# Patient Record
Sex: FEMALE | Race: WHITE | NOT HISPANIC OR LATINO
[De-identification: names, ages, dates, MRNs, and addresses within clinical notes are randomized per-mention and may not be internally consistent; named-entity substitution may affect disease eponyms.]

---

## 2017-01-23 ENCOUNTER — APPOINTMENT (OUTPATIENT)
Dept: INTERNAL MEDICINE | Facility: CLINIC | Age: 77
End: 2017-01-23

## 2017-02-07 ENCOUNTER — APPOINTMENT (OUTPATIENT)
Dept: INTERNAL MEDICINE | Facility: CLINIC | Age: 77
End: 2017-02-07

## 2017-02-07 VITALS
HEART RATE: 92 BPM | TEMPERATURE: 98.4 F | BODY MASS INDEX: 40.8 KG/M2 | HEIGHT: 64 IN | OXYGEN SATURATION: 92 % | WEIGHT: 239 LBS | DIASTOLIC BLOOD PRESSURE: 63 MMHG | SYSTOLIC BLOOD PRESSURE: 131 MMHG | RESPIRATION RATE: 12 BRPM

## 2017-03-01 ENCOUNTER — APPOINTMENT (OUTPATIENT)
Dept: CARDIOLOGY | Facility: CLINIC | Age: 77
End: 2017-03-01

## 2017-03-01 VITALS
TEMPERATURE: 98.7 F | OXYGEN SATURATION: 90 % | WEIGHT: 235 LBS | HEART RATE: 92 BPM | BODY MASS INDEX: 40.12 KG/M2 | HEIGHT: 64 IN | DIASTOLIC BLOOD PRESSURE: 85 MMHG | RESPIRATION RATE: 12 BRPM | SYSTOLIC BLOOD PRESSURE: 156 MMHG

## 2017-03-01 VITALS — SYSTOLIC BLOOD PRESSURE: 135 MMHG | DIASTOLIC BLOOD PRESSURE: 84 MMHG

## 2017-04-06 ENCOUNTER — OUTPATIENT (OUTPATIENT)
Dept: OUTPATIENT SERVICES | Facility: HOSPITAL | Age: 77
LOS: 1 days | Discharge: HOME | End: 2017-04-06

## 2017-04-06 DIAGNOSIS — C50.919 MALIGNANT NEOPLASM OF UNSPECIFIED SITE OF UNSPECIFIED FEMALE BREAST: ICD-10-CM

## 2017-04-07 ENCOUNTER — APPOINTMENT (OUTPATIENT)
Dept: HEMATOLOGY ONCOLOGY | Facility: CLINIC | Age: 77
End: 2017-04-07

## 2017-04-07 VITALS
TEMPERATURE: 97.9 F | RESPIRATION RATE: 16 BRPM | DIASTOLIC BLOOD PRESSURE: 85 MMHG | HEART RATE: 89 BPM | SYSTOLIC BLOOD PRESSURE: 147 MMHG | BODY MASS INDEX: 40.76 KG/M2 | OXYGEN SATURATION: 95 % | WEIGHT: 237.44 LBS

## 2017-04-07 RX ORDER — PREDNISONE 20 MG/1
20 TABLET ORAL
Qty: 10 | Refills: 0 | Status: DISCONTINUED | COMMUNITY
Start: 2016-10-18

## 2017-04-10 DIAGNOSIS — C50.911 MALIGNANT NEOPLASM OF UNSPECIFIED SITE OF RIGHT FEMALE BREAST: ICD-10-CM

## 2017-06-22 ENCOUNTER — RESULT REVIEW (OUTPATIENT)
Age: 77
End: 2017-06-22

## 2017-08-09 ENCOUNTER — APPOINTMENT (OUTPATIENT)
Dept: CARDIOLOGY | Facility: CLINIC | Age: 77
End: 2017-08-09
Payer: MEDICARE

## 2017-08-09 ENCOUNTER — NON-APPOINTMENT (OUTPATIENT)
Age: 77
End: 2017-08-09

## 2017-08-09 ENCOUNTER — LABORATORY RESULT (OUTPATIENT)
Age: 77
End: 2017-08-09

## 2017-08-09 VITALS
HEART RATE: 88 BPM | BODY MASS INDEX: 41.32 KG/M2 | WEIGHT: 242 LBS | SYSTOLIC BLOOD PRESSURE: 132 MMHG | TEMPERATURE: 98 F | RESPIRATION RATE: 12 BRPM | DIASTOLIC BLOOD PRESSURE: 82 MMHG | HEIGHT: 64 IN | OXYGEN SATURATION: 93 %

## 2017-08-09 PROCEDURE — 99215 OFFICE O/P EST HI 40 MIN: CPT

## 2017-08-10 LAB
24R-OH-CALCIDIOL SERPL-MCNC: 53.1 PG/ML
ALBUMIN SERPL ELPH-MCNC: 3.8 G/DL
ALP BLD-CCNC: 81 U/L
ALT SERPL-CCNC: 21 U/L
ANION GAP SERPL CALC-SCNC: 19 MMOL/L
AST SERPL-CCNC: 28 U/L
BASOPHILS # BLD AUTO: 0.05 K/UL
BASOPHILS NFR BLD AUTO: 0.9 %
BILIRUB SERPL-MCNC: 0.5 MG/DL
BUN SERPL-MCNC: 21 MG/DL
CALCIUM SERPL-MCNC: 9.1 MG/DL
CHLORIDE SERPL-SCNC: 102 MMOL/L
CHOLEST SERPL-MCNC: 142 MG/DL
CHOLEST/HDLC SERPL: 3.6 RATIO
CO2 SERPL-SCNC: 24 MMOL/L
CREAT SERPL-MCNC: 1.06 MG/DL
EOSINOPHIL # BLD AUTO: 0.19 K/UL
EOSINOPHIL NFR BLD AUTO: 3.7 %
GLUCOSE SERPL-MCNC: 134 MG/DL
HBA1C MFR BLD HPLC: 5.6 %
HCT VFR BLD CALC: 42.8 %
HDLC SERPL-MCNC: 40 MG/DL
HGB BLD-MCNC: 13.4 G/DL
LDLC SERPL CALC-MCNC: 71 MG/DL
LYMPHOCYTES # BLD AUTO: 1 K/UL
LYMPHOCYTES NFR BLD AUTO: 19.3 %
MAN DIFF?: NORMAL
MCHC RBC-ENTMCNC: 29.4 PG
MCHC RBC-ENTMCNC: 31.3 GM/DL
MCV RBC AUTO: 93.9 FL
MONOCYTES # BLD AUTO: 0.33 K/UL
MONOCYTES NFR BLD AUTO: 6.4 %
NEUTROPHILS # BLD AUTO: 3.62 K/UL
NEUTROPHILS NFR BLD AUTO: 62.4 %
PLATELET # BLD AUTO: 157 K/UL
POTASSIUM SERPL-SCNC: 4 MMOL/L
PROT SERPL-MCNC: 7.1 G/DL
RBC # BLD: 4.56 M/UL
RBC # FLD: 14.6 %
SODIUM SERPL-SCNC: 145 MMOL/L
TRIGL SERPL-MCNC: 153 MG/DL
WBC # FLD AUTO: 5.2 K/UL

## 2017-09-15 ENCOUNTER — APPOINTMENT (OUTPATIENT)
Dept: INTERNAL MEDICINE | Facility: CLINIC | Age: 77
End: 2017-09-15
Payer: MEDICARE

## 2017-09-15 VITALS
DIASTOLIC BLOOD PRESSURE: 83 MMHG | BODY MASS INDEX: 40.12 KG/M2 | HEIGHT: 64 IN | SYSTOLIC BLOOD PRESSURE: 156 MMHG | HEART RATE: 95 BPM | WEIGHT: 235 LBS | RESPIRATION RATE: 12 BRPM | TEMPERATURE: 98.4 F | OXYGEN SATURATION: 94 %

## 2017-09-15 DIAGNOSIS — J30.1 ALLERGIC RHINITIS DUE TO POLLEN: ICD-10-CM

## 2017-09-15 PROCEDURE — 99214 OFFICE O/P EST MOD 30 MIN: CPT

## 2017-12-13 ENCOUNTER — APPOINTMENT (OUTPATIENT)
Dept: CARDIOLOGY | Facility: CLINIC | Age: 77
End: 2017-12-13
Payer: MEDICARE

## 2017-12-13 ENCOUNTER — NON-APPOINTMENT (OUTPATIENT)
Age: 77
End: 2017-12-13

## 2017-12-13 VITALS
SYSTOLIC BLOOD PRESSURE: 140 MMHG | TEMPERATURE: 98.2 F | RESPIRATION RATE: 12 BRPM | HEART RATE: 95 BPM | WEIGHT: 236 LBS | DIASTOLIC BLOOD PRESSURE: 80 MMHG | HEIGHT: 64 IN | BODY MASS INDEX: 40.29 KG/M2 | OXYGEN SATURATION: 94 %

## 2017-12-13 VITALS — DIASTOLIC BLOOD PRESSURE: 80 MMHG | SYSTOLIC BLOOD PRESSURE: 124 MMHG

## 2017-12-13 PROCEDURE — 99214 OFFICE O/P EST MOD 30 MIN: CPT

## 2017-12-13 PROCEDURE — 93306 TTE W/DOPPLER COMPLETE: CPT

## 2018-02-05 ENCOUNTER — APPOINTMENT (OUTPATIENT)
Dept: INTERNAL MEDICINE | Facility: CLINIC | Age: 78
End: 2018-02-05
Payer: MEDICARE

## 2018-02-05 VITALS
WEIGHT: 233 LBS | SYSTOLIC BLOOD PRESSURE: 167 MMHG | RESPIRATION RATE: 12 BRPM | DIASTOLIC BLOOD PRESSURE: 89 MMHG | HEIGHT: 64 IN | OXYGEN SATURATION: 94 % | HEART RATE: 84 BPM | TEMPERATURE: 97.7 F | BODY MASS INDEX: 39.78 KG/M2

## 2018-02-05 VITALS — SYSTOLIC BLOOD PRESSURE: 130 MMHG | DIASTOLIC BLOOD PRESSURE: 80 MMHG

## 2018-02-05 PROCEDURE — 99214 OFFICE O/P EST MOD 30 MIN: CPT

## 2018-02-05 RX ORDER — FLUTICASONE PROPIONATE 50 UG/1
50 SPRAY, METERED NASAL TWICE DAILY
Qty: 3 | Refills: 3 | Status: DISCONTINUED | COMMUNITY
Start: 2017-09-15 | End: 2018-02-05

## 2018-02-07 ENCOUNTER — LABORATORY RESULT (OUTPATIENT)
Age: 78
End: 2018-02-07

## 2018-02-08 LAB
25(OH)D3 SERPL-MCNC: 49 NG/ML
APPEARANCE: ABNORMAL
BASOPHILS # BLD AUTO: 0.02 K/UL
BASOPHILS NFR BLD AUTO: 0.4 %
BILIRUBIN URINE: NEGATIVE
BLOOD URINE: NEGATIVE
CHOLEST SERPL-MCNC: 136 MG/DL
CHOLEST/HDLC SERPL: 3.1 RATIO
COLOR: YELLOW
CREAT SPEC-SCNC: 200 MG/DL
EOSINOPHIL # BLD AUTO: 0.26 K/UL
EOSINOPHIL NFR BLD AUTO: 5 %
FERRITIN SERPL-MCNC: 278 NG/ML
FOLATE SERPL-MCNC: >20 NG/ML
GLUCOSE QUALITATIVE U: NEGATIVE MG/DL
HBA1C MFR BLD HPLC: 5.5 %
HCT VFR BLD CALC: 42.8 %
HDLC SERPL-MCNC: 44 MG/DL
HGB BLD-MCNC: 13.5 G/DL
IMM GRANULOCYTES NFR BLD AUTO: 0.2 %
KETONES URINE: NEGATIVE
LDLC SERPL CALC-MCNC: 68 MG/DL
LEUKOCYTE ESTERASE URINE: NEGATIVE
LYMPHOCYTES # BLD AUTO: 1.47 K/UL
LYMPHOCYTES NFR BLD AUTO: 28.1 %
MAN DIFF?: NORMAL
MCHC RBC-ENTMCNC: 28.5 PG
MCHC RBC-ENTMCNC: 31.5 GM/DL
MCV RBC AUTO: 90.5 FL
MICROALBUMIN 24H UR DL<=1MG/L-MCNC: 10.9 MG/DL
MICROALBUMIN/CREAT 24H UR-RTO: 55 MG/G
MONOCYTES # BLD AUTO: 0.32 K/UL
MONOCYTES NFR BLD AUTO: 6.1 %
NEUTROPHILS # BLD AUTO: 3.16 K/UL
NEUTROPHILS NFR BLD AUTO: 60.2 %
NITRITE URINE: NEGATIVE
PH URINE: 6
PLATELET # BLD AUTO: 139 K/UL
PROTEIN URINE: 30 MG/DL
RBC # BLD: 4.73 M/UL
RBC # FLD: 13.6 %
SPECIFIC GRAVITY URINE: 1.02
TRIGL SERPL-MCNC: 121 MG/DL
TSH SERPL-ACNC: 1.54 UIU/ML
UROBILINOGEN URINE: 1 MG/DL
VIT B12 SERPL-MCNC: 498 PG/ML
WBC # FLD AUTO: 5.24 K/UL

## 2018-02-28 LAB
ALBUMIN SERPL ELPH-MCNC: 3.9 G/DL
ALP BLD-CCNC: 84 U/L
ALT SERPL-CCNC: 24 U/L
ANION GAP SERPL CALC-SCNC: 17 MMOL/L
AST SERPL-CCNC: 30 U/L
BILIRUB SERPL-MCNC: 0.7 MG/DL
BUN SERPL-MCNC: 22 MG/DL
CALCIUM SERPL-MCNC: 9.9 MG/DL
CHLORIDE SERPL-SCNC: 101 MMOL/L
CO2 SERPL-SCNC: 25 MMOL/L
CREAT SERPL-MCNC: 1.11 MG/DL
GLUCOSE SERPL-MCNC: 82 MG/DL
POTASSIUM SERPL-SCNC: 4 MMOL/L
PROT SERPL-MCNC: 7.4 G/DL
SODIUM SERPL-SCNC: 143 MMOL/L

## 2018-04-12 ENCOUNTER — OUTPATIENT (OUTPATIENT)
Dept: OUTPATIENT SERVICES | Facility: HOSPITAL | Age: 78
LOS: 1 days | Discharge: HOME | End: 2018-04-12

## 2018-04-12 DIAGNOSIS — C50.911 MALIGNANT NEOPLASM OF UNSPECIFIED SITE OF RIGHT FEMALE BREAST: ICD-10-CM

## 2018-04-17 ENCOUNTER — APPOINTMENT (OUTPATIENT)
Dept: HEMATOLOGY ONCOLOGY | Facility: CLINIC | Age: 78
End: 2018-04-17
Payer: MEDICARE

## 2018-04-17 VITALS
BODY MASS INDEX: 39.77 KG/M2 | RESPIRATION RATE: 12 BRPM | TEMPERATURE: 98 F | WEIGHT: 231.68 LBS | OXYGEN SATURATION: 95 % | DIASTOLIC BLOOD PRESSURE: 80 MMHG | HEART RATE: 85 BPM | SYSTOLIC BLOOD PRESSURE: 152 MMHG

## 2018-04-17 PROCEDURE — 99214 OFFICE O/P EST MOD 30 MIN: CPT

## 2018-04-18 ENCOUNTER — APPOINTMENT (OUTPATIENT)
Dept: CARDIOLOGY | Facility: CLINIC | Age: 78
End: 2018-04-18
Payer: MEDICARE

## 2018-04-18 ENCOUNTER — NON-APPOINTMENT (OUTPATIENT)
Age: 78
End: 2018-04-18

## 2018-04-18 VITALS
BODY MASS INDEX: 39.61 KG/M2 | HEART RATE: 86 BPM | SYSTOLIC BLOOD PRESSURE: 152 MMHG | RESPIRATION RATE: 12 BRPM | OXYGEN SATURATION: 94 % | HEIGHT: 64 IN | DIASTOLIC BLOOD PRESSURE: 85 MMHG | WEIGHT: 232 LBS

## 2018-04-18 VITALS — SYSTOLIC BLOOD PRESSURE: 130 MMHG | DIASTOLIC BLOOD PRESSURE: 78 MMHG

## 2018-04-18 PROCEDURE — 99214 OFFICE O/P EST MOD 30 MIN: CPT

## 2018-08-07 ENCOUNTER — APPOINTMENT (OUTPATIENT)
Dept: INTERNAL MEDICINE | Facility: CLINIC | Age: 78
End: 2018-08-07
Payer: MEDICARE

## 2018-08-07 VITALS
DIASTOLIC BLOOD PRESSURE: 83 MMHG | RESPIRATION RATE: 12 BRPM | HEART RATE: 79 BPM | BODY MASS INDEX: 38.93 KG/M2 | WEIGHT: 228 LBS | TEMPERATURE: 98.3 F | HEIGHT: 64 IN | OXYGEN SATURATION: 94 % | SYSTOLIC BLOOD PRESSURE: 141 MMHG

## 2018-08-07 PROCEDURE — 99214 OFFICE O/P EST MOD 30 MIN: CPT

## 2018-08-07 RX ORDER — PNEUMOCOCCAL 13-VALENT CONJUGATE VACCINE 2.2; 2.2; 2.2; 2.2; 2.2; 4.4; 2.2; 2.2; 2.2; 2.2; 2.2; 2.2; 2.2 UG/.5ML; UG/.5ML; UG/.5ML; UG/.5ML; UG/.5ML; UG/.5ML; UG/.5ML; UG/.5ML; UG/.5ML; UG/.5ML; UG/.5ML; UG/.5ML; UG/.5ML
INJECTION, SUSPENSION INTRAMUSCULAR
Qty: 1 | Refills: 0 | Status: DISCONTINUED | COMMUNITY
Start: 2018-02-05 | End: 2018-08-07

## 2018-08-07 NOTE — PHYSICAL EXAM

## 2018-08-08 LAB
BASOPHILS # BLD AUTO: 0.02 K/UL
BASOPHILS NFR BLD AUTO: 0.4 %
CHOLEST SERPL-MCNC: 134 MG/DL
CHOLEST/HDLC SERPL: 3.3 RATIO
EOSINOPHIL # BLD AUTO: 0.2 K/UL
EOSINOPHIL NFR BLD AUTO: 4.1 %
FERRITIN SERPL-MCNC: 282 NG/ML
HBA1C MFR BLD HPLC: 5.6 %
HCT VFR BLD CALC: 43.7 %
HDLC SERPL-MCNC: 41 MG/DL
HGB BLD-MCNC: 14.5 G/DL
IMM GRANULOCYTES NFR BLD AUTO: 0.2 %
LDLC SERPL CALC-MCNC: 69 MG/DL
LYMPHOCYTES # BLD AUTO: 1.27 K/UL
LYMPHOCYTES NFR BLD AUTO: 25.8 %
MAGNESIUM SERPL-MCNC: 2 MG/DL
MAN DIFF?: NORMAL
MCHC RBC-ENTMCNC: 30.3 PG
MCHC RBC-ENTMCNC: 33.2 GM/DL
MCV RBC AUTO: 91.2 FL
MONOCYTES # BLD AUTO: 0.33 K/UL
MONOCYTES NFR BLD AUTO: 6.7 %
NEUTROPHILS # BLD AUTO: 3.09 K/UL
NEUTROPHILS NFR BLD AUTO: 62.8 %
PLATELET # BLD AUTO: 143 K/UL
RBC # BLD: 4.79 M/UL
RBC # FLD: 13.6 %
TRIGL SERPL-MCNC: 121 MG/DL
TSH SERPL-ACNC: 1.24 UIU/ML
WBC # FLD AUTO: 4.92 K/UL

## 2018-08-15 ENCOUNTER — APPOINTMENT (OUTPATIENT)
Dept: CARDIOLOGY | Facility: CLINIC | Age: 78
End: 2018-08-15
Payer: MEDICARE

## 2018-08-15 ENCOUNTER — NON-APPOINTMENT (OUTPATIENT)
Age: 78
End: 2018-08-15

## 2018-08-15 VITALS
DIASTOLIC BLOOD PRESSURE: 74 MMHG | WEIGHT: 228 LBS | HEART RATE: 84 BPM | BODY MASS INDEX: 38.93 KG/M2 | HEIGHT: 64 IN | OXYGEN SATURATION: 94 % | TEMPERATURE: 98.1 F | SYSTOLIC BLOOD PRESSURE: 125 MMHG | RESPIRATION RATE: 12 BRPM

## 2018-08-15 PROCEDURE — 99214 OFFICE O/P EST MOD 30 MIN: CPT

## 2018-08-24 LAB
ALBUMIN SERPL ELPH-MCNC: 4.1 G/DL
ALP BLD-CCNC: 91 U/L
ALT SERPL-CCNC: 19 U/L
ANION GAP SERPL CALC-SCNC: 15 MMOL/L
AST SERPL-CCNC: 23 U/L
BILIRUB SERPL-MCNC: 0.7 MG/DL
BUN SERPL-MCNC: 24 MG/DL
CALCIUM SERPL-MCNC: 9.8 MG/DL
CHLORIDE SERPL-SCNC: 103 MMOL/L
CO2 SERPL-SCNC: 25 MMOL/L
CREAT SERPL-MCNC: 1.35 MG/DL
GLUCOSE SERPL-MCNC: 97 MG/DL
POTASSIUM SERPL-SCNC: 4.1 MMOL/L
PROT SERPL-MCNC: 7.3 G/DL
SODIUM SERPL-SCNC: 143 MMOL/L

## 2018-12-05 ENCOUNTER — NON-APPOINTMENT (OUTPATIENT)
Age: 78
End: 2018-12-05

## 2018-12-05 ENCOUNTER — APPOINTMENT (OUTPATIENT)
Dept: CARDIOLOGY | Facility: CLINIC | Age: 78
End: 2018-12-05
Payer: MEDICARE

## 2018-12-05 ENCOUNTER — APPOINTMENT (OUTPATIENT)
Dept: INTERNAL MEDICINE | Facility: CLINIC | Age: 78
End: 2018-12-05
Payer: MEDICARE

## 2018-12-05 VITALS
SYSTOLIC BLOOD PRESSURE: 167 MMHG | TEMPERATURE: 98 F | OXYGEN SATURATION: 94 % | RESPIRATION RATE: 12 BRPM | HEIGHT: 64 IN | BODY MASS INDEX: 38.93 KG/M2 | DIASTOLIC BLOOD PRESSURE: 87 MMHG | HEART RATE: 82 BPM | WEIGHT: 228 LBS

## 2018-12-05 VITALS — DIASTOLIC BLOOD PRESSURE: 74 MMHG | SYSTOLIC BLOOD PRESSURE: 128 MMHG

## 2018-12-05 VITALS — DIASTOLIC BLOOD PRESSURE: 90 MMHG | SYSTOLIC BLOOD PRESSURE: 170 MMHG

## 2018-12-05 PROCEDURE — 99214 OFFICE O/P EST MOD 30 MIN: CPT

## 2018-12-05 PROCEDURE — 99214 OFFICE O/P EST MOD 30 MIN: CPT | Mod: 25

## 2018-12-05 PROCEDURE — 93306 TTE W/DOPPLER COMPLETE: CPT

## 2018-12-05 NOTE — REVIEW OF SYSTEMS
[see HPI] : see HPI [Negative] : Heme/Lymph [Recent Weight Gain (___ Lbs)] : no recent weight gain [Recent Weight Loss (___ Lbs)] : no recent weight loss [Shortness Of Breath] : no shortness of breath [Dyspnea on exertion] : not dyspnea during exertion [Chest Pain] : no chest pain [Lower Ext Edema] : no extremity edema [Palpitations] : no palpitations

## 2018-12-05 NOTE — REASON FOR VISIT
[Follow-Up - Clinic] : a clinic follow-up of [Aortic Stenosis] : aortic stenosis [Hypertension] : hypertension

## 2018-12-05 NOTE — PHYSICAL EXAM
[General Appearance - Well Developed] : well developed [Normal Appearance] : normal appearance [Well Groomed] : well groomed [General Appearance - Well Nourished] : well nourished [No Deformities] : no deformities [General Appearance - In No Acute Distress] : no acute distress [Normal Conjunctiva] : the conjunctiva exhibited no abnormalities [Eyelids - No Xanthelasma] : the eyelids demonstrated no xanthelasmas [Normal Oral Mucosa] : normal oral mucosa [No Oral Pallor] : no oral pallor [No Oral Cyanosis] : no oral cyanosis [Normal Jugular Venous A Waves Present] : normal jugular venous A waves present [Normal Jugular Venous V Waves Present] : normal jugular venous V waves present [No Jugular Venous Whittaker A Waves] : no jugular venous whittaker A waves [Respiration, Rhythm And Depth] : normal respiratory rhythm and effort [Exaggerated Use Of Accessory Muscles For Inspiration] : no accessory muscle use [Auscultation Breath Sounds / Voice Sounds] : lungs were clear to auscultation bilaterally [Heart Rate And Rhythm] : heart rate and rhythm were normal [Heart Sounds] : normal S1 and S2 [Systolic grade ___/6] : A grade [unfilled]/6 systolic murmur was heard. [Abdomen Soft] : soft [Abdomen Tenderness] : non-tender [Abdomen Mass (___ Cm)] : no abdominal mass palpated [Abnormal Walk] : normal gait [Gait - Sufficient For Exercise Testing] : the gait was sufficient for exercise testing [Nail Clubbing] : no clubbing of the fingernails [Cyanosis, Localized] : no localized cyanosis [Petechial Hemorrhages (___cm)] : no petechial hemorrhages [Skin Color & Pigmentation] : normal skin color and pigmentation [] : no rash [No Venous Stasis] : no venous stasis [Skin Lesions] : no skin lesions [No Skin Ulcers] : no skin ulcer [No Xanthoma] : no  xanthoma was observed [Oriented To Time, Place, And Person] : oriented to person, place, and time [Affect] : the affect was normal [Mood] : the mood was normal [No Anxiety] : not feeling anxious

## 2018-12-05 NOTE — HISTORY OF PRESENT ILLNESS
[FreeTextEntry1] : Rachel has been walking for exercise 20 minutes a day on treadmill. She denies any chest pain, palpitations or shortness of breath. No change in functional capacity. Did not understand why BP high earlier today twice.

## 2018-12-05 NOTE — DISCUSSION/SUMMARY
[___ Month(s)] : [unfilled] month(s) [FreeTextEntry1] : The patient is a 77-year-old obese female history of hypertension, hyperlipidemia,  LVH, moderate aortic stenosis who is asymptomatic.\par #1 AS- no change in functional capacity, euvolemic on exam, preliminary review of ECHO ADIS 1.0\par #2 Htn- controlled on losartan/HCTZ\par #3 Lipids- therapeutic on atorvastatin\par #4 General-  Importance of trying to lose weight by continuing her exercise and adherence to a low fat, low cholesterol diet was also discussed.

## 2018-12-06 LAB
ANION GAP SERPL CALC-SCNC: 13 MMOL/L
BASOPHILS # BLD AUTO: 0.03 K/UL
BASOPHILS NFR BLD AUTO: 0.5 %
BUN SERPL-MCNC: 26 MG/DL
CALCIUM SERPL-MCNC: 9.8 MG/DL
CHLORIDE SERPL-SCNC: 107 MMOL/L
CO2 SERPL-SCNC: 25 MMOL/L
CREAT SERPL-MCNC: 1.11 MG/DL
EOSINOPHIL # BLD AUTO: 0.14 K/UL
EOSINOPHIL NFR BLD AUTO: 2.5 %
GLUCOSE SERPL-MCNC: 85 MG/DL
GLUCOSE SERPL-MCNC: 91 MG/DL
HBA1C MFR BLD HPLC: 5.4 %
HCT VFR BLD CALC: 42.3 %
HGB BLD-MCNC: 13.7 G/DL
IMM GRANULOCYTES NFR BLD AUTO: 0.2 %
LYMPHOCYTES # BLD AUTO: 1.55 K/UL
LYMPHOCYTES NFR BLD AUTO: 27.2 %
MAGNESIUM SERPL-MCNC: 2.1 MG/DL
MAN DIFF?: NORMAL
MCHC RBC-ENTMCNC: 29.3 PG
MCHC RBC-ENTMCNC: 32.4 GM/DL
MCV RBC AUTO: 90.6 FL
MONOCYTES # BLD AUTO: 0.25 K/UL
MONOCYTES NFR BLD AUTO: 4.4 %
NEUTROPHILS # BLD AUTO: 3.71 K/UL
NEUTROPHILS NFR BLD AUTO: 65.2 %
PLATELET # BLD AUTO: 145 K/UL
POTASSIUM SERPL-SCNC: 4.5 MMOL/L
RBC # BLD: 4.67 M/UL
RBC # FLD: 13 %
SODIUM SERPL-SCNC: 145 MMOL/L
WBC # FLD AUTO: 5.69 K/UL

## 2019-01-04 ENCOUNTER — APPOINTMENT (OUTPATIENT)
Dept: INTERNAL MEDICINE | Facility: CLINIC | Age: 79
End: 2019-01-04
Payer: MEDICARE

## 2019-01-04 VITALS
DIASTOLIC BLOOD PRESSURE: 75 MMHG | SYSTOLIC BLOOD PRESSURE: 110 MMHG | HEIGHT: 64 IN | WEIGHT: 231 LBS | TEMPERATURE: 98.2 F | HEART RATE: 73 BPM | RESPIRATION RATE: 12 BRPM | BODY MASS INDEX: 39.44 KG/M2 | OXYGEN SATURATION: 96 %

## 2019-01-04 DIAGNOSIS — J01.00 ACUTE MAXILLARY SINUSITIS, UNSPECIFIED: ICD-10-CM

## 2019-01-04 PROCEDURE — 99215 OFFICE O/P EST HI 40 MIN: CPT

## 2019-01-04 NOTE — REVIEW OF SYSTEMS
[Nasal Discharge] : nasal discharge [Postnasal Drip] : postnasal drip [Cough] : cough [Negative] : Heme/Lymph [Earache] : no earache [Hearing Loss] : no hearing loss [Nosebleed] : no nosebleeds [Hoarseness] : no hoarseness [Sore Throat] : no sore throat [Shortness Of Breath] : no shortness of breath [Wheezing] : no wheezing [Dyspnea on Exertion] : no dyspnea on exertion

## 2019-01-07 LAB — RAPID RVP RESULT: NOT DETECTED

## 2019-01-30 ENCOUNTER — RX RENEWAL (OUTPATIENT)
Age: 79
End: 2019-01-30

## 2019-02-21 ENCOUNTER — RX RENEWAL (OUTPATIENT)
Age: 79
End: 2019-02-21

## 2019-04-17 ENCOUNTER — APPOINTMENT (OUTPATIENT)
Dept: INTERNAL MEDICINE | Facility: CLINIC | Age: 79
End: 2019-04-17
Payer: MEDICARE

## 2019-04-17 ENCOUNTER — APPOINTMENT (OUTPATIENT)
Dept: CARDIOLOGY | Facility: CLINIC | Age: 79
End: 2019-04-17
Payer: MEDICARE

## 2019-04-17 ENCOUNTER — NON-APPOINTMENT (OUTPATIENT)
Age: 79
End: 2019-04-17

## 2019-04-17 VITALS
WEIGHT: 223 LBS | DIASTOLIC BLOOD PRESSURE: 92 MMHG | TEMPERATURE: 98.4 F | SYSTOLIC BLOOD PRESSURE: 150 MMHG | BODY MASS INDEX: 38.28 KG/M2 | OXYGEN SATURATION: 96 % | HEART RATE: 82 BPM | RESPIRATION RATE: 12 BRPM

## 2019-04-17 VITALS — SYSTOLIC BLOOD PRESSURE: 110 MMHG | DIASTOLIC BLOOD PRESSURE: 80 MMHG

## 2019-04-17 VITALS — SYSTOLIC BLOOD PRESSURE: 110 MMHG | DIASTOLIC BLOOD PRESSURE: 70 MMHG

## 2019-04-17 PROCEDURE — 99214 OFFICE O/P EST MOD 30 MIN: CPT | Mod: 25

## 2019-04-17 PROCEDURE — 99213 OFFICE O/P EST LOW 20 MIN: CPT

## 2019-04-17 RX ORDER — CEFUROXIME AXETIL 500 MG/1
500 TABLET ORAL
Qty: 20 | Refills: 0 | Status: DISCONTINUED | COMMUNITY
Start: 2019-01-04 | End: 2019-04-17

## 2019-04-17 RX ORDER — SODIUM CHLORIDE 0.65 %
0.65 AEROSOL, SPRAY (ML) NASAL TWICE DAILY
Qty: 1 | Refills: 2 | Status: DISCONTINUED | COMMUNITY
Start: 2019-01-04 | End: 2019-04-17

## 2019-04-17 RX ORDER — FAMOTIDINE 20 MG/1
20 TABLET, FILM COATED ORAL
Qty: 15 | Refills: 0 | Status: DISCONTINUED | COMMUNITY
Start: 2019-01-04 | End: 2019-04-17

## 2019-04-17 NOTE — PHYSICAL EXAM
[General Appearance - Well Developed] : well developed [Normal Appearance] : normal appearance [Well Groomed] : well groomed [General Appearance - Well Nourished] : well nourished [No Deformities] : no deformities [Normal Conjunctiva] : the conjunctiva exhibited no abnormalities [General Appearance - In No Acute Distress] : no acute distress [Eyelids - No Xanthelasma] : the eyelids demonstrated no xanthelasmas [Normal Oral Mucosa] : normal oral mucosa [No Oral Pallor] : no oral pallor [Normal Jugular Venous A Waves Present] : normal jugular venous A waves present [Normal Jugular Venous V Waves Present] : normal jugular venous V waves present [No Oral Cyanosis] : no oral cyanosis [No Jugular Venous Whittaker A Waves] : no jugular venous whittaker A waves [Respiration, Rhythm And Depth] : normal respiratory rhythm and effort [Exaggerated Use Of Accessory Muscles For Inspiration] : no accessory muscle use [Auscultation Breath Sounds / Voice Sounds] : lungs were clear to auscultation bilaterally [Heart Rate And Rhythm] : heart rate and rhythm were normal [Heart Sounds] : normal S1 and S2 [Systolic grade ___/6] : A grade [unfilled]/6 systolic murmur was heard. [Abdomen Soft] : soft [Abdomen Mass (___ Cm)] : no abdominal mass palpated [Abdomen Tenderness] : non-tender [Gait - Sufficient For Exercise Testing] : the gait was sufficient for exercise testing [Abnormal Walk] : normal gait [Petechial Hemorrhages (___cm)] : no petechial hemorrhages [Nail Clubbing] : no clubbing of the fingernails [Cyanosis, Localized] : no localized cyanosis [Skin Color & Pigmentation] : normal skin color and pigmentation [No Venous Stasis] : no venous stasis [] : no rash [Skin Lesions] : no skin lesions [No Skin Ulcers] : no skin ulcer [No Xanthoma] : no  xanthoma was observed [Oriented To Time, Place, And Person] : oriented to person, place, and time [Affect] : the affect was normal [Mood] : the mood was normal [No Anxiety] : not feeling anxious

## 2019-04-17 NOTE — HISTORY OF PRESENT ILLNESS
[FreeTextEntry1] : Rachel has been walking for exercise 20 minutes a day on treadmill. She denies any chest pain, palpitations or shortness of breath. No change in functional capacity.

## 2019-04-17 NOTE — COUNSELING
[Weight management counseling provided] : Weight management [Healthy eating counseling provided] : healthy eating [Activity counseling provided] : activity [Target Wt Loss Goal ___] : Target weight loss goal [unfilled] lbs [Weight Self Once Weekly] : Weight self once weekly [Low Fat Diet] : Low fat diet [Low Salt Diet] : Low salt diet [Decrease Portions] : Decrease food portions [___ min/wk activity recommended] : [unfilled] min/wk activity recommended [Walking] : Walking [None] : None [Good understanding] : Patient has a good understanding of lifestyle changes and the steps needed to achieve self management goals

## 2019-04-17 NOTE — DISCUSSION/SUMMARY
[___ Month(s)] : [unfilled] month(s) [FreeTextEntry1] : The patient is a 78-year-old obese female history of hypertension, hyperlipidemia,  LVH, moderate aortic stenosis who is asymptomatic.\par #1 AS- no change in functional capacity, euvolemic on exam,  ECHO ADIS 1.0\par #2 Htn- controlled on losartan/HCTZ\par #3 Lipids- therapeutic on atorvastatin\par #4 General-  Importance of continuing to lose weight by continuing her exercise and adherence to a low fat, low cholesterol diet was also discussed.

## 2019-04-17 NOTE — PHYSICAL EXAM

## 2019-04-21 LAB
ALBUMIN SERPL ELPH-MCNC: 4.1 G/DL
ALP BLD-CCNC: 101 U/L
ALT SERPL-CCNC: 16 U/L
ANION GAP SERPL CALC-SCNC: 14 MMOL/L
AST SERPL-CCNC: 23 U/L
BASOPHILS # BLD AUTO: 0.03 K/UL
BASOPHILS NFR BLD AUTO: 0.6 %
BILIRUB SERPL-MCNC: 0.6 MG/DL
BUN SERPL-MCNC: 20 MG/DL
CALCIUM SERPL-MCNC: 9.5 MG/DL
CHLORIDE SERPL-SCNC: 105 MMOL/L
CHOLEST SERPL-MCNC: 139 MG/DL
CHOLEST/HDLC SERPL: 3.2 RATIO
CO2 SERPL-SCNC: 25 MMOL/L
CREAT SERPL-MCNC: 1.12 MG/DL
EOSINOPHIL # BLD AUTO: 0.17 K/UL
EOSINOPHIL NFR BLD AUTO: 3.5 %
ESTIMATED AVERAGE GLUCOSE: 111 MG/DL
GLUCOSE SERPL-MCNC: 102 MG/DL
GLUCOSE SERPL-MCNC: 103 MG/DL
HBA1C MFR BLD HPLC: 5.5 %
HCT VFR BLD CALC: 43.1 %
HDLC SERPL-MCNC: 44 MG/DL
HGB BLD-MCNC: 13.6 G/DL
IMM GRANULOCYTES NFR BLD AUTO: 0.4 %
LDLC SERPL CALC-MCNC: 73 MG/DL
LYMPHOCYTES # BLD AUTO: 1.49 K/UL
LYMPHOCYTES NFR BLD AUTO: 30.6 %
MAN DIFF?: NORMAL
MCHC RBC-ENTMCNC: 29.4 PG
MCHC RBC-ENTMCNC: 31.6 GM/DL
MCV RBC AUTO: 93.3 FL
MONOCYTES # BLD AUTO: 0.27 K/UL
MONOCYTES NFR BLD AUTO: 5.5 %
NEUTROPHILS # BLD AUTO: 2.89 K/UL
NEUTROPHILS NFR BLD AUTO: 59.4 %
PLATELET # BLD AUTO: 141 K/UL
POTASSIUM SERPL-SCNC: 4.1 MMOL/L
PROT SERPL-MCNC: 6.7 G/DL
RBC # BLD: 4.62 M/UL
RBC # FLD: 12.9 %
SODIUM SERPL-SCNC: 144 MMOL/L
TRIGL SERPL-MCNC: 109 MG/DL
WBC # FLD AUTO: 4.87 K/UL

## 2019-07-17 ENCOUNTER — OUTPATIENT (OUTPATIENT)
Dept: OUTPATIENT SERVICES | Facility: HOSPITAL | Age: 79
LOS: 1 days | Discharge: HOME | End: 2019-07-17

## 2019-07-17 DIAGNOSIS — C50.911 MALIGNANT NEOPLASM OF UNSPECIFIED SITE OF RIGHT FEMALE BREAST: ICD-10-CM

## 2019-07-19 ENCOUNTER — APPOINTMENT (OUTPATIENT)
Dept: HEMATOLOGY ONCOLOGY | Facility: CLINIC | Age: 79
End: 2019-07-19
Payer: MEDICARE

## 2019-07-19 VITALS
BODY MASS INDEX: 38.6 KG/M2 | TEMPERATURE: 98.2 F | RESPIRATION RATE: 16 BRPM | OXYGEN SATURATION: 95 % | DIASTOLIC BLOOD PRESSURE: 89 MMHG | SYSTOLIC BLOOD PRESSURE: 136 MMHG | WEIGHT: 224.87 LBS | HEART RATE: 91 BPM

## 2019-07-19 PROCEDURE — 99213 OFFICE O/P EST LOW 20 MIN: CPT

## 2019-07-19 NOTE — HISTORY OF PRESENT ILLNESS
[Disease: _____________________] : Disease: [unfilled] [T: ___] : T[unfilled] [N: ___] : N[unfilled] [M: ___] : M[unfilled] [AJCC Stage: ____] : AJCC Stage: [unfilled] [de-identified] : Right breast cancer at 72\par s/p lumpectomy and SLN 9/13/02\par s/p radiation\par s/p Arimidex 1/03-3/08\par Enrolled in [employer],but withdrew after three months complaining of extreme sleepiness [de-identified] : 9 mm IDC, SBR 6/9, 2 SLN negative, ER/NE 90%, HER-2 negative [de-identified] : Rachel is doing well with no new medical problems.  \par Remains active with good energy and appetite. No pain.\par She completed her endocrine therapy for breast cancer 11 years ago.\par \par Routine Health Maintenance:\par Mammogram and breast ultrasound: 07/18\par Pap Smear: 2014 negative\par Bone density: 9/1/16 showed T scores of 4.3 in spine and -0.6 in femoral neck\par Colonoscopy: within 10 years, no polyps\par

## 2019-08-15 ENCOUNTER — APPOINTMENT (OUTPATIENT)
Dept: CARDIOLOGY | Facility: CLINIC | Age: 79
End: 2019-08-15
Payer: MEDICARE

## 2019-08-15 ENCOUNTER — NON-APPOINTMENT (OUTPATIENT)
Age: 79
End: 2019-08-15

## 2019-08-15 VITALS
OXYGEN SATURATION: 98 % | BODY MASS INDEX: 38.76 KG/M2 | HEART RATE: 74 BPM | WEIGHT: 227 LBS | HEIGHT: 64 IN | RESPIRATION RATE: 14 BRPM | TEMPERATURE: 98.4 F | DIASTOLIC BLOOD PRESSURE: 76 MMHG | SYSTOLIC BLOOD PRESSURE: 134 MMHG

## 2019-08-15 PROCEDURE — 99214 OFFICE O/P EST MOD 30 MIN: CPT | Mod: 25

## 2019-08-15 PROCEDURE — 93000 ELECTROCARDIOGRAM COMPLETE: CPT

## 2019-08-15 NOTE — PHYSICAL EXAM
[General Appearance - Well Developed] : well developed [Normal Appearance] : normal appearance [Well Groomed] : well groomed [General Appearance - Well Nourished] : well nourished [No Deformities] : no deformities [General Appearance - In No Acute Distress] : no acute distress [Normal Conjunctiva] : the conjunctiva exhibited no abnormalities [Eyelids - No Xanthelasma] : the eyelids demonstrated no xanthelasmas [Normal Oral Mucosa] : normal oral mucosa [No Oral Cyanosis] : no oral cyanosis [No Oral Pallor] : no oral pallor [Normal Jugular Venous A Waves Present] : normal jugular venous A waves present [Normal Jugular Venous V Waves Present] : normal jugular venous V waves present [No Jugular Venous Whittaker A Waves] : no jugular venous whittaker A waves [Respiration, Rhythm And Depth] : normal respiratory rhythm and effort [Exaggerated Use Of Accessory Muscles For Inspiration] : no accessory muscle use [Auscultation Breath Sounds / Voice Sounds] : lungs were clear to auscultation bilaterally [Heart Rate And Rhythm] : heart rate and rhythm were normal [Heart Sounds] : normal S1 and S2 [Systolic grade ___/6] : A grade [unfilled]/6 systolic murmur was heard. [Abdomen Tenderness] : non-tender [Abdomen Soft] : soft [Abdomen Mass (___ Cm)] : no abdominal mass palpated [Gait - Sufficient For Exercise Testing] : the gait was sufficient for exercise testing [Abnormal Walk] : normal gait [Nail Clubbing] : no clubbing of the fingernails [Cyanosis, Localized] : no localized cyanosis [Petechial Hemorrhages (___cm)] : no petechial hemorrhages [Skin Color & Pigmentation] : normal skin color and pigmentation [No Venous Stasis] : no venous stasis [] : no rash [Skin Lesions] : no skin lesions [No Skin Ulcers] : no skin ulcer [No Xanthoma] : no  xanthoma was observed [Oriented To Time, Place, And Person] : oriented to person, place, and time [Mood] : the mood was normal [Affect] : the affect was normal [No Anxiety] : not feeling anxious

## 2019-08-15 NOTE — DISCUSSION/SUMMARY
[___ Month(s)] : [unfilled] month(s) [FreeTextEntry1] : The patient is a 78-year-old obese female history of hypertension, hyperlipidemia,  LVH, moderate aortic stenosis who is asymptomatic.\par #1 AS- no change in functional capacity, euvolemic on exam,  ECHO ADIS 1.0, echo next visit\par #2 Htn- controlled on losartan/HCTZ\par #3 Lipids- therapeutic on atorvastatin\par #4 General-  Importance of continuing to lose weight by continuing her exercise and adherence to a low fat, low cholesterol diet was also discussed.

## 2019-08-15 NOTE — HISTORY OF PRESENT ILLNESS
[FreeTextEntry1] : Rachel has been walking for exercise 20 minutes a day on treadmill. She denies any chest pain, palpitations or shortness of breath. No change in functional capacity. Although she is going to bed earlier and has no trouble sleeping all night.

## 2019-10-16 ENCOUNTER — APPOINTMENT (OUTPATIENT)
Dept: CARDIOLOGY | Facility: CLINIC | Age: 79
End: 2019-10-16
Payer: MEDICARE

## 2019-10-16 ENCOUNTER — NON-APPOINTMENT (OUTPATIENT)
Age: 79
End: 2019-10-16

## 2019-10-16 ENCOUNTER — APPOINTMENT (OUTPATIENT)
Dept: INTERNAL MEDICINE | Facility: CLINIC | Age: 79
End: 2019-10-16
Payer: MEDICARE

## 2019-10-16 VITALS
BODY MASS INDEX: 38.41 KG/M2 | SYSTOLIC BLOOD PRESSURE: 158 MMHG | RESPIRATION RATE: 14 BRPM | DIASTOLIC BLOOD PRESSURE: 90 MMHG | WEIGHT: 225 LBS | HEIGHT: 64 IN | OXYGEN SATURATION: 95 % | TEMPERATURE: 98.6 F | HEART RATE: 84 BPM

## 2019-10-16 VITALS — SYSTOLIC BLOOD PRESSURE: 134 MMHG | DIASTOLIC BLOOD PRESSURE: 78 MMHG

## 2019-10-16 VITALS — SYSTOLIC BLOOD PRESSURE: 130 MMHG | DIASTOLIC BLOOD PRESSURE: 80 MMHG

## 2019-10-16 PROCEDURE — 99214 OFFICE O/P EST MOD 30 MIN: CPT

## 2019-10-16 PROCEDURE — 93306 TTE W/DOPPLER COMPLETE: CPT

## 2019-10-16 NOTE — REVIEW OF SYSTEMS
[see HPI] : see HPI [Negative] : Endocrine [Recent Weight Gain (___ Lbs)] : no recent weight gain [Recent Weight Loss (___ Lbs)] : no recent weight loss [Shortness Of Breath] : no shortness of breath [Dyspnea on exertion] : not dyspnea during exertion [Chest Pain] : no chest pain [Lower Ext Edema] : no extremity edema [Palpitations] : no palpitations

## 2019-10-16 NOTE — HISTORY OF PRESENT ILLNESS
[FreeTextEntry1] : Rachel continues to walk for exercise 20 minutes a day on treadmill. She denies any chest pain, palpitations or shortness of breath. No change in functional capacity.

## 2019-10-16 NOTE — DISCUSSION/SUMMARY
[___ Month(s)] : [unfilled] month(s) [FreeTextEntry1] : The patient is a 78-year-old obese female history of hypertension, hyperlipidemia,  LVH,  aortic stenosis who is asymptomatic.\par #1 AS- no change in functional capacity, euvolemic on exam,  ECHO ADIS 1.0, physical exam sounds severe, ECHO today, discussed AVR vs TAVR and evaluation with Valve clinic pending results, she is agreeable\par #2 Htn- controlled on losartan/HCTZ\par #3 Lipids- therapeutic on atorvastatin\par #4 General-  Importance of continuing to lose weight by continuing her exercise and adherence to a low fat, low cholesterol diet was also discussed.

## 2019-10-16 NOTE — REASON FOR VISIT
[Follow-Up - Clinic] : a clinic follow-up of [Hypertension] : hypertension [Aortic Stenosis] : aortic stenosis

## 2019-10-16 NOTE — PHYSICAL EXAM
[No Acute Distress] : no acute distress [Well Developed] : well developed [Well Nourished] : well nourished [Well-Appearing] : well-appearing [Normal Sclera/Conjunctiva] : normal sclera/conjunctiva [PERRL] : pupils equal round and reactive to light [EOMI] : extraocular movements intact [Normal Outer Ear/Nose] : the outer ears and nose were normal in appearance [Normal Oropharynx] : the oropharynx was normal [No JVD] : no jugular venous distention [No Lymphadenopathy] : no lymphadenopathy [Supple] : supple [Thyroid Normal, No Nodules] : the thyroid was normal and there were no nodules present [No Respiratory Distress] : no respiratory distress  [No Accessory Muscle Use] : no accessory muscle use [Clear to Auscultation] : lungs were clear to auscultation bilaterally [Normal Rate] : normal rate  [Regular Rhythm] : with a regular rhythm [Normal S1, S2] : normal S1 and S2 [III] : a grade 3 [No Carotid Bruits] : no carotid bruits [No Varicosities] : no varicosities [No Abdominal Bruit] : a ~M bruit was not heard ~T in the abdomen [Pedal Pulses Present] : the pedal pulses are present [No Edema] : there was no peripheral edema [No Extremity Clubbing/Cyanosis] : no extremity clubbing/cyanosis [Soft] : abdomen soft [No Palpable Aorta] : no palpable aorta [Non Tender] : non-tender [Non-distended] : non-distended [No Masses] : no abdominal mass palpated [Normal Bowel Sounds] : normal bowel sounds [No HSM] : no HSM [No CVA Tenderness] : no CVA  tenderness [Normal Posterior Cervical Nodes] : no posterior cervical lymphadenopathy [Normal Anterior Cervical Nodes] : no anterior cervical lymphadenopathy [No Joint Swelling] : no joint swelling [No Spinal Tenderness] : no spinal tenderness [No Rash] : no rash [Grossly Normal Strength/Tone] : grossly normal strength/tone [Coordination Grossly Intact] : coordination grossly intact [Normal Gait] : normal gait [No Focal Deficits] : no focal deficits [Normal Affect] : the affect was normal [Normal Insight/Judgement] : insight and judgment were intact [Deep Tendon Reflexes (DTR)] : deep tendon reflexes were 2+ and symmetric

## 2019-10-16 NOTE — PHYSICAL EXAM
[Normal Appearance] : normal appearance [General Appearance - Well Developed] : well developed [General Appearance - Well Nourished] : well nourished [Well Groomed] : well groomed [General Appearance - In No Acute Distress] : no acute distress [No Deformities] : no deformities [Normal Conjunctiva] : the conjunctiva exhibited no abnormalities [Eyelids - No Xanthelasma] : the eyelids demonstrated no xanthelasmas [Normal Oral Mucosa] : normal oral mucosa [No Oral Cyanosis] : no oral cyanosis [No Oral Pallor] : no oral pallor [Normal Jugular Venous A Waves Present] : normal jugular venous A waves present [No Jugular Venous Whittaker A Waves] : no jugular venous whittaker A waves [Normal Jugular Venous V Waves Present] : normal jugular venous V waves present [Exaggerated Use Of Accessory Muscles For Inspiration] : no accessory muscle use [Respiration, Rhythm And Depth] : normal respiratory rhythm and effort [Heart Sounds] : normal S1 and S2 [Heart Rate And Rhythm] : heart rate and rhythm were normal [Auscultation Breath Sounds / Voice Sounds] : lungs were clear to auscultation bilaterally [Systolic grade ___/6] : A grade [unfilled]/6 systolic murmur was heard. [Abdomen Soft] : soft [Abdomen Mass (___ Cm)] : no abdominal mass palpated [Abdomen Tenderness] : non-tender [Gait - Sufficient For Exercise Testing] : the gait was sufficient for exercise testing [Nail Clubbing] : no clubbing of the fingernails [Abnormal Walk] : normal gait [Petechial Hemorrhages (___cm)] : no petechial hemorrhages [Cyanosis, Localized] : no localized cyanosis [No Venous Stasis] : no venous stasis [] : no rash [Skin Color & Pigmentation] : normal skin color and pigmentation [Skin Lesions] : no skin lesions [No Skin Ulcers] : no skin ulcer [No Xanthoma] : no  xanthoma was observed [Oriented To Time, Place, And Person] : oriented to person, place, and time [Affect] : the affect was normal [Mood] : the mood was normal [No Anxiety] : not feeling anxious

## 2019-10-18 ENCOUNTER — RX CHANGE (OUTPATIENT)
Age: 79
End: 2019-10-18

## 2019-10-19 LAB
ALBUMIN SERPL ELPH-MCNC: 4.3 G/DL
ALP BLD-CCNC: 84 U/L
ALT SERPL-CCNC: 19 U/L
ANION GAP SERPL CALC-SCNC: 15 MMOL/L
AST SERPL-CCNC: 27 U/L
BASOPHILS # BLD AUTO: 0.04 K/UL
BASOPHILS NFR BLD AUTO: 0.7 %
BILIRUB SERPL-MCNC: 0.7 MG/DL
BUN SERPL-MCNC: 23 MG/DL
CALCIUM SERPL-MCNC: 10.1 MG/DL
CHLORIDE SERPL-SCNC: 102 MMOL/L
CHOLEST SERPL-MCNC: 142 MG/DL
CHOLEST/HDLC SERPL: 3.2 RATIO
CO2 SERPL-SCNC: 26 MMOL/L
CREAT SERPL-MCNC: 1.13 MG/DL
EOSINOPHIL # BLD AUTO: 0.11 K/UL
EOSINOPHIL NFR BLD AUTO: 1.9 %
ESTIMATED AVERAGE GLUCOSE: 108 MG/DL
GLUCOSE SERPL-MCNC: 101 MG/DL
GLUCOSE SERPL-MCNC: 102 MG/DL
HBA1C MFR BLD HPLC: 5.4 %
HCT VFR BLD CALC: 42.6 %
HDLC SERPL-MCNC: 44 MG/DL
HGB BLD-MCNC: 13.7 G/DL
IMM GRANULOCYTES NFR BLD AUTO: 0.2 %
LDLC SERPL CALC-MCNC: 71 MG/DL
LYMPHOCYTES # BLD AUTO: 1.28 K/UL
LYMPHOCYTES NFR BLD AUTO: 22.1 %
MAN DIFF?: NORMAL
MCHC RBC-ENTMCNC: 29.6 PG
MCHC RBC-ENTMCNC: 32.2 GM/DL
MCV RBC AUTO: 92 FL
MONOCYTES # BLD AUTO: 0.35 K/UL
MONOCYTES NFR BLD AUTO: 6 %
NEUTROPHILS # BLD AUTO: 4.01 K/UL
NEUTROPHILS NFR BLD AUTO: 69.1 %
PLATELET # BLD AUTO: 158 K/UL
POTASSIUM SERPL-SCNC: 4.3 MMOL/L
PROT SERPL-MCNC: 7.2 G/DL
RBC # BLD: 4.63 M/UL
RBC # FLD: 12.6 %
SODIUM SERPL-SCNC: 143 MMOL/L
TRIGL SERPL-MCNC: 135 MG/DL
WBC # FLD AUTO: 5.8 K/UL

## 2019-11-04 PROBLEM — J06.9 VIRAL URI WITH COUGH: Status: RESOLVED | Noted: 2019-01-04 | Resolved: 2019-11-04

## 2019-11-05 ENCOUNTER — APPOINTMENT (OUTPATIENT)
Dept: CARDIOTHORACIC SURGERY | Facility: CLINIC | Age: 79
End: 2019-11-05

## 2019-11-05 ENCOUNTER — NON-APPOINTMENT (OUTPATIENT)
Age: 79
End: 2019-11-05

## 2019-11-05 ENCOUNTER — OUTPATIENT (OUTPATIENT)
Dept: OUTPATIENT SERVICES | Facility: HOSPITAL | Age: 79
LOS: 1 days | End: 2019-11-05
Payer: MEDICARE

## 2019-11-05 ENCOUNTER — APPOINTMENT (OUTPATIENT)
Dept: CV DIAGNOSITCS | Facility: HOSPITAL | Age: 79
End: 2019-11-05

## 2019-11-05 ENCOUNTER — APPOINTMENT (OUTPATIENT)
Dept: CARDIOTHORACIC SURGERY | Facility: CLINIC | Age: 79
End: 2019-11-05
Payer: MEDICARE

## 2019-11-05 VITALS
HEIGHT: 64 IN | DIASTOLIC BLOOD PRESSURE: 84 MMHG | OXYGEN SATURATION: 96 % | HEART RATE: 76 BPM | TEMPERATURE: 98 F | BODY MASS INDEX: 38.58 KG/M2 | WEIGHT: 226 LBS | RESPIRATION RATE: 12 BRPM | SYSTOLIC BLOOD PRESSURE: 154 MMHG

## 2019-11-05 DIAGNOSIS — I35.0 NONRHEUMATIC AORTIC (VALVE) STENOSIS: ICD-10-CM

## 2019-11-05 DIAGNOSIS — J06.9 ACUTE UPPER RESPIRATORY INFECTION, UNSPECIFIED: ICD-10-CM

## 2019-11-05 DIAGNOSIS — H00.019 HORDEOLUM EXTERNUM UNSPECIFIED EYE, UNSPECIFIED EYELID: ICD-10-CM

## 2019-11-05 DIAGNOSIS — B97.89 ACUTE UPPER RESPIRATORY INFECTION, UNSPECIFIED: ICD-10-CM

## 2019-11-05 DIAGNOSIS — Z87.01 PERSONAL HISTORY OF PNEUMONIA (RECURRENT): ICD-10-CM

## 2019-11-05 DIAGNOSIS — R73.01 IMPAIRED FASTING GLUCOSE: ICD-10-CM

## 2019-11-05 DIAGNOSIS — L23.7 ALLERGIC CONTACT DERMATITIS DUE TO PLANTS, EXCEPT FOOD: ICD-10-CM

## 2019-11-05 PROCEDURE — 93306 TTE W/DOPPLER COMPLETE: CPT | Mod: 26

## 2019-11-05 PROCEDURE — 93306 TTE W/DOPPLER COMPLETE: CPT

## 2019-11-05 PROCEDURE — 99204 OFFICE O/P NEW MOD 45 MIN: CPT

## 2019-11-05 PROCEDURE — 93000 ELECTROCARDIOGRAM COMPLETE: CPT

## 2019-11-05 NOTE — DATA REVIEWED
[FreeTextEntry1] : Echocardiogram on 10/16/2019 demonstrated\par ADIS 0.8\par mGr 21 \par pGr 31 \par AoV 2.8\par mild mitral regurgitation\par  LVEF 65%.

## 2019-11-05 NOTE — REVIEW OF SYSTEMS
[Joint Pain] : joint pain [Negative] : Heme/Lymph [Feeling Fatigued] : not feeling fatigued [Shortness Of Breath] : no shortness of breath [Dyspnea on exertion] : not dyspnea during exertion [Lower Ext Edema] : no extremity edema [Abdominal Pain] : no abdominal pain [Nausea] : no nausea [Change in Appetite] : no change in appetite [Dizziness] : no dizziness

## 2019-11-05 NOTE — HISTORY OF PRESENT ILLNESS
[FreeTextEntry1] : Rachel is a 78 year old female with PMH of HTN, HLD, OA, breast CA, and aortic valve stenosis.  Recent TTE demonstrated a progression of her known aortic valve stenosis.  She was referred to valve clinic by Dr. Lopez for consideration for JULIET. She feels well, and states "I do not know why Dr. Lopez referred me here. I feel well." She denies palpitations, PND, orthopnea, dizziness, or syncope.

## 2019-11-05 NOTE — HISTORY OF PRESENT ILLNESS
[FreeTextEntry1] : Ms. Blackman is a 78 year old female referred by Dr. Lopez for evaluation of aortic stenosis. She underwent an echocardiogram with Dr. Lopez on 10/16 that suggested progression of AS to severe with an ADIS of 0.8. Her TTE was repeated today (for additional measurements of her AS) and demonstrates an ADIS of 1 with a mean AVg of 20, a DVI of 0.3, and an AoV of 3m/s, more consistent with moderate AS.  \par \claudia Ramos remains active, walking on her treadmill 20 minutes per day with no decrease in functional capacity.  She has noticed no symptoms on the treadmill or fatigue afterwards.  She denies angina, dyspnea, fatigue, PND, orthopnea, dizziness, syncope, or edema. Past medical history includes HTN, HLD, OA, and breast cancer 11 years ago s/p lumpectomy and radiation therapy with no recurrence.

## 2019-11-05 NOTE — DISCUSSION/SUMMARY
[Aortic Stenosis] : aortic stenosis [Stable] : stable [Multidetector Cardiac CT] : multidetector cardiac CT [Cardiac Catheterization] : cardiac catheterization [Coronary Angiography] : coronary angiography [None] : none [Patient] : the patient [Family] : the patient's family [FreeTextEntry1] : Rachel has AS with an ADIS of 1 and is active without symptoms.  I am recommending continued close monitoring at this time with a repeat TTE and visit with me in 6 months--or sooner should she develop any of the symptoms I discussed with her and her daughter in extensive detail.  She will continue to follow closely with Dr Lopez and I asked that she see her in 3 months.  Eventually, when her AS meets criteria for intervention, she will be scheduled for her cardiac catheterization (with Dr Lopez) and cardiac structural CT.

## 2019-11-05 NOTE — PHYSICAL EXAM
[General Appearance - Well Developed] : well developed [Normal Appearance] : normal appearance [General Appearance - Well Nourished] : well nourished [Normal Conjunctiva] : the conjunctiva exhibited no abnormalities [Normal Oral Mucosa] : normal oral mucosa [Normal Jugular Venous A Waves Present] : normal jugular venous A waves present [Normal Jugular Venous V Waves Present] : normal jugular venous V waves present [No Jugular Venous Whittaker A Waves] : no jugular venous whittaker A waves [Respiration, Rhythm And Depth] : normal respiratory rhythm and effort [Auscultation Breath Sounds / Voice Sounds] : lungs were clear to auscultation bilaterally [Bowel Sounds] : normal bowel sounds [Abdomen Soft] : soft [Abdomen Tenderness] : non-tender [Abnormal Walk] : normal gait [Nail Clubbing] : no clubbing of the fingernails [Cyanosis, Localized] : no localized cyanosis [Skin Turgor] : normal skin turgor [] : no rash [Oriented To Time, Place, And Person] : oriented to person, place, and time [Impaired Insight] : insight and judgment were intact [Affect] : the affect was normal [Normal Rate] : normal [Rhythm Regular] : regular [II] : a grade 2 [FreeTextEntry1] : trace nonpitting pedal edema

## 2019-11-05 NOTE — PHYSICAL EXAM
[General Appearance - Alert] : alert [General Appearance - In No Acute Distress] : in no acute distress [Sclera] : the sclera and conjunctiva were normal [PERRL With Normal Accommodation] : pupils were equal in size, round, and reactive to light [Extraocular Movements] : extraocular movements were intact [Outer Ear] : the ears and nose were normal in appearance [Oropharynx] : the oropharynx was normal [Jugular Venous Distention Increased] : there was no jugular-venous distention [] : no respiratory distress [Respiration, Rhythm And Depth] : normal respiratory rhythm and effort [Apical Impulse] : the apical impulse was normal [Examination Of The Chest] : the chest was normal in appearance [Edema] : there was no peripheral edema [Veins - Varicosity Changes] : there were no varicosital changes [Breast Appearance] : normal in appearance [Bowel Sounds] : normal bowel sounds [Abdomen Soft] : soft [FreeTextEntry1] : deferred [Cervical Lymph Nodes Enlarged Posterior Bilaterally] : posterior cervical [Cervical Lymph Nodes Enlarged Anterior Bilaterally] : anterior cervical [No CVA Tenderness] : no ~M costovertebral angle tenderness [Abnormal Walk] : normal gait [Involuntary Movements] : no involuntary movements were seen [Skin Color & Pigmentation] : normal skin color and pigmentation [No Focal Deficits] : no focal deficits [Oriented To Time, Place, And Person] : oriented to person, place, and time [Impaired Insight] : insight and judgment were intact

## 2019-11-05 NOTE — CONSULT LETTER
[Dear  ___] : Dear ~MICHELLE, [Courtesy Letter:] : I had the pleasure of seeing your patient, [unfilled], in my office today. [Please see my note below.] : Please see my note below. [Consult Closing:] : Thank you very much for allowing me to participate in the care of this patient.  If you have any questions, please do not hesitate to contact me. [Sincerely,] : Sincerely, [FreeTextEntry2] : Dr. Shirley Lopez\par [address]\par [facility] [address] [FreeTextEntry3] : Lb Mcacll MD\par \par Cardiovascular & Thoracic Surgery\par Professor\par [facility]\par \par

## 2020-01-10 ENCOUNTER — APPOINTMENT (OUTPATIENT)
Dept: INTERNAL MEDICINE | Facility: CLINIC | Age: 80
End: 2020-01-10
Payer: MEDICARE

## 2020-01-10 VITALS
RESPIRATION RATE: 12 BRPM | HEART RATE: 71 BPM | HEIGHT: 64 IN | DIASTOLIC BLOOD PRESSURE: 68 MMHG | SYSTOLIC BLOOD PRESSURE: 142 MMHG | BODY MASS INDEX: 37.9 KG/M2 | OXYGEN SATURATION: 92 % | WEIGHT: 222 LBS | TEMPERATURE: 98.6 F

## 2020-01-10 VITALS — DIASTOLIC BLOOD PRESSURE: 70 MMHG | SYSTOLIC BLOOD PRESSURE: 130 MMHG

## 2020-01-10 PROCEDURE — 99214 OFFICE O/P EST MOD 30 MIN: CPT

## 2020-01-10 NOTE — PHYSICAL EXAM
[No Acute Distress] : no acute distress [Well Nourished] : well nourished [Well Developed] : well developed [Well-Appearing] : well-appearing [Normal Sclera/Conjunctiva] : normal sclera/conjunctiva [EOMI] : extraocular movements intact [PERRL] : pupils equal round and reactive to light [Normal Outer Ear/Nose] : the outer ears and nose were normal in appearance [Normal Oropharynx] : the oropharynx was normal [No JVD] : no jugular venous distention [Supple] : supple [No Lymphadenopathy] : no lymphadenopathy [Thyroid Normal, No Nodules] : the thyroid was normal and there were no nodules present [No Respiratory Distress] : no respiratory distress  [No Accessory Muscle Use] : no accessory muscle use [Clear to Auscultation] : lungs were clear to auscultation bilaterally [Normal Rate] : normal rate  [Regular Rhythm] : with a regular rhythm [Normal S1, S2] : normal S1 and S2 [No Murmur] : no murmur heard [No Abdominal Bruit] : a ~M bruit was not heard ~T in the abdomen [No Carotid Bruits] : no carotid bruits [No Varicosities] : no varicosities [Pedal Pulses Present] : the pedal pulses are present [No Edema] : there was no peripheral edema [No Palpable Aorta] : no palpable aorta [No Extremity Clubbing/Cyanosis] : no extremity clubbing/cyanosis [Soft] : abdomen soft [Non Tender] : non-tender [Non-distended] : non-distended [No HSM] : no HSM [No Masses] : no abdominal mass palpated [Normal Bowel Sounds] : normal bowel sounds [Normal Anterior Cervical Nodes] : no anterior cervical lymphadenopathy [Normal Posterior Cervical Nodes] : no posterior cervical lymphadenopathy [No CVA Tenderness] : no CVA  tenderness [No Spinal Tenderness] : no spinal tenderness [No Joint Swelling] : no joint swelling [No Rash] : no rash [Grossly Normal Strength/Tone] : grossly normal strength/tone [Coordination Grossly Intact] : coordination grossly intact [No Focal Deficits] : no focal deficits [Normal Gait] : normal gait [Deep Tendon Reflexes (DTR)] : deep tendon reflexes were 2+ and symmetric [Normal Affect] : the affect was normal [Normal Insight/Judgement] : insight and judgment were intact

## 2020-01-13 ENCOUNTER — APPOINTMENT (OUTPATIENT)
Dept: INTERNAL MEDICINE | Facility: CLINIC | Age: 80
End: 2020-01-13

## 2020-02-12 ENCOUNTER — APPOINTMENT (OUTPATIENT)
Dept: CARDIOLOGY | Facility: CLINIC | Age: 80
End: 2020-02-12
Payer: MEDICARE

## 2020-02-12 ENCOUNTER — NON-APPOINTMENT (OUTPATIENT)
Age: 80
End: 2020-02-12

## 2020-02-12 VITALS
HEIGHT: 64 IN | DIASTOLIC BLOOD PRESSURE: 76 MMHG | TEMPERATURE: 98.4 F | HEART RATE: 89 BPM | SYSTOLIC BLOOD PRESSURE: 150 MMHG | WEIGHT: 221 LBS | OXYGEN SATURATION: 95 % | BODY MASS INDEX: 37.73 KG/M2 | RESPIRATION RATE: 12 BRPM

## 2020-02-12 VITALS — DIASTOLIC BLOOD PRESSURE: 70 MMHG | SYSTOLIC BLOOD PRESSURE: 130 MMHG

## 2020-02-12 DIAGNOSIS — R06.02 SHORTNESS OF BREATH: ICD-10-CM

## 2020-02-12 PROCEDURE — 93000 ELECTROCARDIOGRAM COMPLETE: CPT

## 2020-02-12 PROCEDURE — 99214 OFFICE O/P EST MOD 30 MIN: CPT | Mod: 25

## 2020-02-12 NOTE — PHYSICAL EXAM
[General Appearance - Well Developed] : well developed [Normal Appearance] : normal appearance [Well Groomed] : well groomed [General Appearance - Well Nourished] : well nourished [General Appearance - In No Acute Distress] : no acute distress [No Deformities] : no deformities [Normal Conjunctiva] : the conjunctiva exhibited no abnormalities [Eyelids - No Xanthelasma] : the eyelids demonstrated no xanthelasmas [Normal Oral Mucosa] : normal oral mucosa [No Oral Pallor] : no oral pallor [No Oral Cyanosis] : no oral cyanosis [Normal Jugular Venous A Waves Present] : normal jugular venous A waves present [Normal Jugular Venous V Waves Present] : normal jugular venous V waves present [No Jugular Venous Whittaker A Waves] : no jugular venous whittaker A waves [Respiration, Rhythm And Depth] : normal respiratory rhythm and effort [Exaggerated Use Of Accessory Muscles For Inspiration] : no accessory muscle use [Auscultation Breath Sounds / Voice Sounds] : lungs were clear to auscultation bilaterally [Heart Rate And Rhythm] : heart rate and rhythm were normal [Heart Sounds] : normal S1 and S2 [Systolic grade ___/6] : A grade [unfilled]/6 systolic murmur was heard. [Abdomen Soft] : soft [Abdomen Tenderness] : non-tender [Abdomen Mass (___ Cm)] : no abdominal mass palpated [Abnormal Walk] : normal gait [Gait - Sufficient For Exercise Testing] : the gait was sufficient for exercise testing [Nail Clubbing] : no clubbing of the fingernails [Petechial Hemorrhages (___cm)] : no petechial hemorrhages [Cyanosis, Localized] : no localized cyanosis [Skin Color & Pigmentation] : normal skin color and pigmentation [] : no rash [No Venous Stasis] : no venous stasis [Skin Lesions] : no skin lesions [No Skin Ulcers] : no skin ulcer [No Xanthoma] : no  xanthoma was observed [Oriented To Time, Place, And Person] : oriented to person, place, and time [Affect] : the affect was normal [No Anxiety] : not feeling anxious [Mood] : the mood was normal

## 2020-02-12 NOTE — HISTORY OF PRESENT ILLNESS
[FreeTextEntry1] : Rachel not walking on treadmill as she was before. She denies any chest pain, palpitations or shortness of breath.

## 2020-02-12 NOTE — REVIEW OF SYSTEMS
[see HPI] : see HPI [Negative] : Heme/Lymph [Recent Weight Loss (___ Lbs)] : no recent weight loss [Recent Weight Gain (___ Lbs)] : no recent weight gain [Dyspnea on exertion] : not dyspnea during exertion [Chest Pain] : no chest pain [Shortness Of Breath] : no shortness of breath [Palpitations] : no palpitations [Lower Ext Edema] : no extremity edema

## 2020-02-12 NOTE — DISCUSSION/SUMMARY
[___ Month(s)] : [unfilled] month(s) [FreeTextEntry1] : The patient is an 79-year-old obese female history of hypertension, hyperlipidemia,  LVH,  aortic stenosis who is deconditioned\par #1 AS-  euvolemic on exam,  ECHO ADIS 1.0, physical exam sounds severe, ECHO 5/8/20 with Dr. Hawthorne\par #2 Htn- controlled on losartan/HCTZ\par #3 Lipids- therapeutic on atorvastatin\par #4 General- We discussed adherence to a Mediterranean diet, weight loss and at least 30 minutes of daily exercise.\par

## 2020-05-08 ENCOUNTER — APPOINTMENT (OUTPATIENT)
Dept: CV DIAGNOSITCS | Facility: HOSPITAL | Age: 80
End: 2020-05-08

## 2020-05-08 ENCOUNTER — APPOINTMENT (OUTPATIENT)
Dept: CARDIOTHORACIC SURGERY | Facility: CLINIC | Age: 80
End: 2020-05-08

## 2020-05-11 ENCOUNTER — APPOINTMENT (OUTPATIENT)
Dept: INTERNAL MEDICINE | Facility: CLINIC | Age: 80
End: 2020-05-11
Payer: MEDICARE

## 2020-05-11 VITALS — SYSTOLIC BLOOD PRESSURE: 125 MMHG | DIASTOLIC BLOOD PRESSURE: 80 MMHG

## 2020-05-11 VITALS
TEMPERATURE: 98.1 F | OXYGEN SATURATION: 93 % | BODY MASS INDEX: 37.22 KG/M2 | WEIGHT: 218 LBS | HEART RATE: 71 BPM | HEIGHT: 64 IN | RESPIRATION RATE: 12 BRPM | SYSTOLIC BLOOD PRESSURE: 146 MMHG | DIASTOLIC BLOOD PRESSURE: 77 MMHG

## 2020-05-11 PROCEDURE — 99214 OFFICE O/P EST MOD 30 MIN: CPT | Mod: CS

## 2020-05-11 RX ORDER — ZOSTER VACCINE RECOMBINANT, ADJUVANTED 50 MCG/0.5
50 KIT INTRAMUSCULAR
Qty: 1 | Refills: 1 | Status: DISCONTINUED | COMMUNITY
Start: 2020-01-10 | End: 2020-05-11

## 2020-05-11 NOTE — PHYSICAL EXAM
[No Acute Distress] : no acute distress [Well Nourished] : well nourished [Well-Appearing] : well-appearing [Well Developed] : well developed [Normal Sclera/Conjunctiva] : normal sclera/conjunctiva [PERRL] : pupils equal round and reactive to light [EOMI] : extraocular movements intact [Normal Outer Ear/Nose] : the outer ears and nose were normal in appearance [Normal Oropharynx] : the oropharynx was normal [No JVD] : no jugular venous distention [No Lymphadenopathy] : no lymphadenopathy [Supple] : supple [Thyroid Normal, No Nodules] : the thyroid was normal and there were no nodules present [No Respiratory Distress] : no respiratory distress  [No Accessory Muscle Use] : no accessory muscle use [Clear to Auscultation] : lungs were clear to auscultation bilaterally [Regular Rhythm] : with a regular rhythm [Normal Rate] : normal rate  [Normal S1, S2] : normal S1 and S2 [No Murmur] : no murmur heard [No Carotid Bruits] : no carotid bruits [No Abdominal Bruit] : a ~M bruit was not heard ~T in the abdomen [No Varicosities] : no varicosities [No Edema] : there was no peripheral edema [Pedal Pulses Present] : the pedal pulses are present [No Palpable Aorta] : no palpable aorta [No Extremity Clubbing/Cyanosis] : no extremity clubbing/cyanosis [Soft] : abdomen soft [Non Tender] : non-tender [Non-distended] : non-distended [No Masses] : no abdominal mass palpated [No HSM] : no HSM [Normal Bowel Sounds] : normal bowel sounds [Normal Posterior Cervical Nodes] : no posterior cervical lymphadenopathy [Normal Anterior Cervical Nodes] : no anterior cervical lymphadenopathy [No Spinal Tenderness] : no spinal tenderness [No CVA Tenderness] : no CVA  tenderness [No Joint Swelling] : no joint swelling [Grossly Normal Strength/Tone] : grossly normal strength/tone [No Rash] : no rash [Coordination Grossly Intact] : coordination grossly intact [Normal Gait] : normal gait [No Focal Deficits] : no focal deficits [Normal Affect] : the affect was normal [Deep Tendon Reflexes (DTR)] : deep tendon reflexes were 2+ and symmetric [Normal Insight/Judgement] : insight and judgment were intact

## 2020-05-12 LAB
25(OH)D3 SERPL-MCNC: 60.5 NG/ML
ALBUMIN SERPL ELPH-MCNC: 4.1 G/DL
ALP BLD-CCNC: 82 U/L
ALT SERPL-CCNC: 19 U/L
ANION GAP SERPL CALC-SCNC: 17 MMOL/L
APPEARANCE: ABNORMAL
AST SERPL-CCNC: 25 U/L
BACTERIA: ABNORMAL
BILIRUB SERPL-MCNC: 0.7 MG/DL
BILIRUBIN URINE: NEGATIVE
BLOOD URINE: NEGATIVE
BUN SERPL-MCNC: 28 MG/DL
CALCIUM SERPL-MCNC: 9.6 MG/DL
CHLORIDE SERPL-SCNC: 105 MMOL/L
CHOLEST SERPL-MCNC: 144 MG/DL
CHOLEST/HDLC SERPL: 2.8 RATIO
CO2 SERPL-SCNC: 24 MMOL/L
COLOR: YELLOW
CREAT SERPL-MCNC: 1.24 MG/DL
CREAT SPEC-SCNC: 184 MG/DL
ESTIMATED AVERAGE GLUCOSE: 108 MG/DL
GLUCOSE QUALITATIVE U: NEGATIVE
GLUCOSE SERPL-MCNC: 95 MG/DL
GLUCOSE SERPL-MCNC: 97 MG/DL
HBA1C MFR BLD HPLC: 5.4 %
HDLC SERPL-MCNC: 52 MG/DL
HYALINE CASTS: 5 /LPF
KETONES URINE: NEGATIVE
LDLC SERPL CALC-MCNC: 72 MG/DL
LEUKOCYTE ESTERASE URINE: ABNORMAL
MICROALBUMIN 24H UR DL<=1MG/L-MCNC: 6.5 MG/DL
MICROALBUMIN/CREAT 24H UR-RTO: 35 MG/G
MICROSCOPIC-UA: NORMAL
NITRITE URINE: NEGATIVE
PH URINE: 6.5
POTASSIUM SERPL-SCNC: 4.1 MMOL/L
PROT SERPL-MCNC: 6.6 G/DL
PROTEIN URINE: NORMAL
RED BLOOD CELLS URINE: 2 /HPF
SARS-COV-2 IGG SERPL IA-ACNC: <0.1 INDEX
SARS-COV-2 IGG SERPL QL IA: NEGATIVE
SODIUM SERPL-SCNC: 146 MMOL/L
SPECIFIC GRAVITY URINE: 1.02
SQUAMOUS EPITHELIAL CELLS: 5 /HPF
TRIGL SERPL-MCNC: 98 MG/DL
UROBILINOGEN URINE: ABNORMAL
WHITE BLOOD CELLS URINE: 24 /HPF

## 2020-05-13 LAB
BASOPHILS # BLD AUTO: 0.05 K/UL
BASOPHILS NFR BLD AUTO: 0.9 %
EOSINOPHIL # BLD AUTO: 0.12 K/UL
EOSINOPHIL NFR BLD AUTO: 2.1 %
HCT VFR BLD CALC: 42.3 %
HGB BLD-MCNC: 13.5 G/DL
IMM GRANULOCYTES NFR BLD AUTO: 0.2 %
LYMPHOCYTES # BLD AUTO: 1.27 K/UL
LYMPHOCYTES NFR BLD AUTO: 22.1 %
MAN DIFF?: NORMAL
MCHC RBC-ENTMCNC: 29.7 PG
MCHC RBC-ENTMCNC: 31.9 GM/DL
MCV RBC AUTO: 93.2 FL
MONOCYTES # BLD AUTO: 0.35 K/UL
MONOCYTES NFR BLD AUTO: 6.1 %
NEUTROPHILS # BLD AUTO: 3.95 K/UL
NEUTROPHILS NFR BLD AUTO: 68.6 %
PLATELET # BLD AUTO: 142 K/UL
RBC # BLD: 4.54 M/UL
RBC # FLD: 12.7 %
WBC # FLD AUTO: 5.75 K/UL

## 2020-05-18 LAB
APPEARANCE: CLEAR
BACTERIA UR CULT: ABNORMAL
BACTERIA: ABNORMAL
BILIRUBIN URINE: NEGATIVE
BLOOD URINE: NEGATIVE
COLOR: YELLOW
GLUCOSE QUALITATIVE U: NEGATIVE
HYALINE CASTS: 0 /LPF
KETONES URINE: NEGATIVE
LEUKOCYTE ESTERASE URINE: ABNORMAL
MICROSCOPIC-UA: NORMAL
NITRITE URINE: POSITIVE
PH URINE: 6.5
PROTEIN URINE: NORMAL
RED BLOOD CELLS URINE: 3 /HPF
SPECIFIC GRAVITY URINE: 1.02
SQUAMOUS EPITHELIAL CELLS: 1 /HPF
UROBILINOGEN URINE: NORMAL
WHITE BLOOD CELLS URINE: 31 /HPF

## 2020-06-10 ENCOUNTER — NON-APPOINTMENT (OUTPATIENT)
Age: 80
End: 2020-06-10

## 2020-06-10 ENCOUNTER — APPOINTMENT (OUTPATIENT)
Dept: CARDIOLOGY | Facility: CLINIC | Age: 80
End: 2020-06-10
Payer: MEDICARE

## 2020-06-10 VITALS — DIASTOLIC BLOOD PRESSURE: 74 MMHG | SYSTOLIC BLOOD PRESSURE: 130 MMHG

## 2020-06-10 VITALS
HEART RATE: 69 BPM | WEIGHT: 221 LBS | DIASTOLIC BLOOD PRESSURE: 73 MMHG | SYSTOLIC BLOOD PRESSURE: 146 MMHG | OXYGEN SATURATION: 94 % | BODY MASS INDEX: 37.73 KG/M2 | RESPIRATION RATE: 14 BRPM | HEIGHT: 64 IN

## 2020-06-10 PROCEDURE — 99214 OFFICE O/P EST MOD 30 MIN: CPT

## 2020-06-10 NOTE — HISTORY OF PRESENT ILLNESS
[FreeTextEntry1] : Rachel feels more fatigued than last visit. She walks 20-30 minutes most days.  She denies any chest pain, palpitations or shortness of breath.

## 2020-06-10 NOTE — DISCUSSION/SUMMARY
[___ Month(s)] : [unfilled] month(s) [FreeTextEntry1] : The patient is an 79-year-old obese female history of hypertension, hyperlipidemia,  LVH,  aortic stenosis who is more fatigued than prior visit\par #1 AS-  euvolemic on exam,  ECHO ADIS 1.0, physical exam sounds severe, ECHO 6/12/20 with Dr. Hawthorne\par #2 Htn- controlled on losartan/HCTZ\par #3 Lipids- therapeutic on atorvastatin\par #4 General- We discussed adherence to a Mediterranean diet, weight loss and at least 30 minutes of daily exercise.\par

## 2020-06-10 NOTE — PHYSICAL EXAM
[General Appearance - Well Developed] : well developed [Normal Appearance] : normal appearance [Well Groomed] : well groomed [No Deformities] : no deformities [General Appearance - Well Nourished] : well nourished [General Appearance - In No Acute Distress] : no acute distress [Normal Conjunctiva] : the conjunctiva exhibited no abnormalities [Normal Oral Mucosa] : normal oral mucosa [Eyelids - No Xanthelasma] : the eyelids demonstrated no xanthelasmas [No Oral Cyanosis] : no oral cyanosis [No Oral Pallor] : no oral pallor [Normal Jugular Venous A Waves Present] : normal jugular venous A waves present [Normal Jugular Venous V Waves Present] : normal jugular venous V waves present [No Jugular Venous Whittaker A Waves] : no jugular venous whittaker A waves [Respiration, Rhythm And Depth] : normal respiratory rhythm and effort [Exaggerated Use Of Accessory Muscles For Inspiration] : no accessory muscle use [Auscultation Breath Sounds / Voice Sounds] : lungs were clear to auscultation bilaterally [Heart Rate And Rhythm] : heart rate and rhythm were normal [Heart Sounds] : normal S1 and S2 [Abdomen Soft] : soft [Systolic grade ___/6] : A grade [unfilled]/6 systolic murmur was heard. [Abdomen Tenderness] : non-tender [Abdomen Mass (___ Cm)] : no abdominal mass palpated [Abnormal Walk] : normal gait [Gait - Sufficient For Exercise Testing] : the gait was sufficient for exercise testing [Nail Clubbing] : no clubbing of the fingernails [Cyanosis, Localized] : no localized cyanosis [Petechial Hemorrhages (___cm)] : no petechial hemorrhages [Skin Color & Pigmentation] : normal skin color and pigmentation [] : no rash [Skin Lesions] : no skin lesions [No Venous Stasis] : no venous stasis [No Skin Ulcers] : no skin ulcer [No Xanthoma] : no  xanthoma was observed [Oriented To Time, Place, And Person] : oriented to person, place, and time [Affect] : the affect was normal [Mood] : the mood was normal [No Anxiety] : not feeling anxious

## 2020-06-10 NOTE — REVIEW OF SYSTEMS
[see HPI] : see HPI [Negative] : Heme/Lymph [Recent Weight Gain (___ Lbs)] : no recent weight gain [Recent Weight Loss (___ Lbs)] : no recent weight loss [Shortness Of Breath] : no shortness of breath [Dyspnea on exertion] : not dyspnea during exertion [Lower Ext Edema] : no extremity edema [Chest Pain] : no chest pain [Palpitations] : no palpitations

## 2020-06-12 ENCOUNTER — APPOINTMENT (OUTPATIENT)
Dept: CARDIOTHORACIC SURGERY | Facility: CLINIC | Age: 80
End: 2020-06-12
Payer: MEDICARE

## 2020-06-12 ENCOUNTER — OUTPATIENT (OUTPATIENT)
Dept: OUTPATIENT SERVICES | Facility: HOSPITAL | Age: 80
LOS: 1 days | End: 2020-06-12
Payer: MEDICARE

## 2020-06-12 ENCOUNTER — APPOINTMENT (OUTPATIENT)
Dept: CV DIAGNOSITCS | Facility: HOSPITAL | Age: 80
End: 2020-06-12

## 2020-06-12 ENCOUNTER — NON-APPOINTMENT (OUTPATIENT)
Age: 80
End: 2020-06-12

## 2020-06-12 VITALS
RESPIRATION RATE: 14 BRPM | HEART RATE: 77 BPM | DIASTOLIC BLOOD PRESSURE: 76 MMHG | HEIGHT: 65 IN | TEMPERATURE: 98.2 F | SYSTOLIC BLOOD PRESSURE: 141 MMHG | BODY MASS INDEX: 36.32 KG/M2 | WEIGHT: 218 LBS | OXYGEN SATURATION: 95 %

## 2020-06-12 DIAGNOSIS — I35.0 NONRHEUMATIC AORTIC (VALVE) STENOSIS: ICD-10-CM

## 2020-06-12 PROCEDURE — 93000 ELECTROCARDIOGRAM COMPLETE: CPT

## 2020-06-12 PROCEDURE — 93306 TTE W/DOPPLER COMPLETE: CPT | Mod: 26

## 2020-06-12 PROCEDURE — 99214 OFFICE O/P EST MOD 30 MIN: CPT

## 2020-06-12 PROCEDURE — 93306 TTE W/DOPPLER COMPLETE: CPT

## 2020-06-12 RX ORDER — CIPROFLOXACIN HYDROCHLORIDE 500 MG/1
500 TABLET, FILM COATED ORAL
Qty: 10 | Refills: 0 | Status: DISCONTINUED | COMMUNITY
Start: 2020-05-18 | End: 2020-06-12

## 2020-06-12 NOTE — HISTORY OF PRESENT ILLNESS
[FreeTextEntry1] : ALESSANDRO CONTRERAS is a 79 year old female here on 06/12/2020, 7 months after she was last seen for her initial visit. At that time, she was active with no decreased in functional capacity or signs of angina, dyspnea, or HF.  TTE at that time demonstrated an ADIS of 1 and pGr of 31, mGr of 21, and AoV 2.8.  She comes to the office today with a new echo reporting feeling "OK".  She does not have the "vim and vigor" that she once had but she "doesn't feel bad". Maybe a little bit more fatigue than in November.  She doesn't feel "crappy".\par \par She currently denies fever, chills, cough, palpitations, angina, dyspnea, dizziness, lightheadedness, syncope, or peripheral edema. Her energy level is OK and her appetite is good. Denies bowel or bladder problems. If she is carrying a laundry bag up the stairs, she is glad she is at the top of the steps and is a little short of breath. This is new since November. No new hospitalizations, emergency room/urgent care visits, new medical diagnoses, surgery, or cardiac testing since her last office visit.  She walks on the treadmill about 30 minutes 4 times a week at 1.5 MPH.  She feels "fine" when walking.  She tried to go to 2.0 a few days ago and it was too fast.  She takes her BP at home and typically gets 125-130 systolic. \par \par PCP: Dr. Jayden Brock\par CARD: Dr. Shirley Lopez\par

## 2020-06-12 NOTE — PHYSICAL EXAM
[General Appearance - Well Developed] : well developed [No Deformities] : no deformities [General Appearance - Well Nourished] : well nourished [Normal Appearance] : normal appearance [Well Groomed] : well groomed [General Appearance - In No Acute Distress] : no acute distress [Normal Conjunctiva] : the conjunctiva exhibited no abnormalities [Normal Oral Mucosa] : normal oral mucosa [Eyelids - No Xanthelasma] : the eyelids demonstrated no xanthelasmas [No Oral Pallor] : no oral pallor [No Oral Cyanosis] : no oral cyanosis [Normal Jugular Venous A Waves Present] : normal jugular venous A waves present [No Jugular Venous Whittaker A Waves] : no jugular venous whittaker A waves [Normal Jugular Venous V Waves Present] : normal jugular venous V waves present [Respiration, Rhythm And Depth] : normal respiratory rhythm and effort [Auscultation Breath Sounds / Voice Sounds] : lungs were clear to auscultation bilaterally [Exaggerated Use Of Accessory Muscles For Inspiration] : no accessory muscle use [Normal Rate] : normal [Heart Rate ___] : [unfilled] bpm [Normal S1] : normal S1 [Rhythm Regular] : regular [Normal S2] : normal S2 [No Gallop] : no gallop heard [II] : a grade 2 [2+] : left 2+ [No Abnormalities] : the abdominal aorta was not enlarged and no bruit was heard [Bowel Sounds] : normal bowel sounds [Abdomen Soft] : soft [Abdomen Tenderness] : non-tender [Abnormal Walk] : normal gait [Cyanosis, Localized] : no localized cyanosis [Nail Clubbing] : no clubbing of the fingernails [Petechial Hemorrhages (___cm)] : no petechial hemorrhages [Skin Color & Pigmentation] : normal skin color and pigmentation [] : no rash [Skin Turgor] : normal skin turgor [No Venous Stasis] : no venous stasis [Oriented To Time, Place, And Person] : oriented to person, place, and time [Impaired Insight] : insight and judgment were intact [Affect] : the affect was normal [Mood] : the mood was normal [Memory Remote] : remote memory was not impaired [Memory Recent] : recent memory was not impaired [No Anxiety] : not feeling anxious [Click] : no click [Distant] : the heart sounds were ~L not distant [Pericardial Rub] : no pericardial rub [Right Carotid Bruit] : no bruit heard over the right carotid [Left Carotid Bruit] : no bruit heard over the left carotid [Bruit] : no bruit heard [___ +] : bilateral [unfilled]U+ pitting edema to the ankles [Rt] : no varicose veins of the right leg [Lt] : no varicose veins of the left leg

## 2020-06-12 NOTE — REVIEW OF SYSTEMS
[Lower Ext Edema] : lower extremity edema [Negative] : Heme/Lymph [Fever] : no fever [Chills] : no chills [Feeling Fatigued] : not feeling fatigued [Shortness Of Breath] : no shortness of breath [Dyspnea on exertion] : not dyspnea during exertion [Chest  Pressure] : no chest pressure [Chest Pain] : no chest pain [Palpitations] : no palpitations [Cough] : no cough [Joint Pain] : no joint pain [Joint Stiffness] : no joint stiffness [Dizziness] : no dizziness [Numbness (Hypesthesia)] : no numbness [Tingling (Paresthesia)] : no tingling [Easy Bleeding] : no tendency for easy bleeding [Easy Bruising] : no tendency for easy bruising

## 2020-06-12 NOTE — DISCUSSION/SUMMARY
[Moderate Aortic Stenosis] : moderate aortic stenosis [Deteriorating] : deteriorating [Multidetector Cardiac CT] : multidetector cardiac CT [Cardiac Catheterization] : cardiac catheterization [Coronary Angiography] : coronary angiography [None] : none [Aortic Valve Replacement] : aortic valve replacement [FreeTextEntry1] : Ms Blackman returns for follow up of AS, with a repeat TTE today demonstrating slight progression as compared to prior--now likely in the range of moderate to severe (DVI is just shy of 0.3). I recommended continued close and watchful waiting, however she feels that her QOL and functional capacity have declined (albeit slightly) since her prior visit--notable she becomes winded carrying laundry up the stairs. In that context, I am recommending she discuss with Dr Lopez proceeding with her right and left cardiac catheterization (not urgently). Once completed, she may then proceed with her cardiac structural CT--this will also allow assessment of her AV calcium burden. Once completed, all imaging will be evaluated by the Heart Team and an optimal treatment strategy recommended. She has evidence of conduction disease on her ECG and there is a high likelihood she will require a PPM post TAVR. [Patient] : the patient

## 2020-06-18 DIAGNOSIS — Z01.818 ENCOUNTER FOR OTHER PREPROCEDURAL EXAMINATION: ICD-10-CM

## 2020-06-19 ENCOUNTER — APPOINTMENT (OUTPATIENT)
Dept: DISASTER EMERGENCY | Facility: CLINIC | Age: 80
End: 2020-06-19

## 2020-06-19 LAB — SARS-COV-2 N GENE NPH QL NAA+PROBE: NOT DETECTED

## 2020-06-22 ENCOUNTER — OUTPATIENT (OUTPATIENT)
Dept: OUTPATIENT SERVICES | Facility: HOSPITAL | Age: 80
LOS: 1 days | End: 2020-06-22
Payer: MEDICARE

## 2020-06-22 DIAGNOSIS — I35.0 NONRHEUMATIC AORTIC (VALVE) STENOSIS: ICD-10-CM

## 2020-06-22 LAB
ALBUMIN SERPL ELPH-MCNC: 4.3 G/DL — SIGNIFICANT CHANGE UP (ref 3.3–5)
ALP SERPL-CCNC: 77 U/L — SIGNIFICANT CHANGE UP (ref 40–120)
ALT FLD-CCNC: 17 U/L — SIGNIFICANT CHANGE UP (ref 10–45)
ANION GAP SERPL CALC-SCNC: 13 MMOL/L — SIGNIFICANT CHANGE UP (ref 5–17)
AST SERPL-CCNC: 24 U/L — SIGNIFICANT CHANGE UP (ref 10–40)
BILIRUB SERPL-MCNC: 0.6 MG/DL — SIGNIFICANT CHANGE UP (ref 0.2–1.2)
BUN SERPL-MCNC: 28 MG/DL — HIGH (ref 7–23)
CALCIUM SERPL-MCNC: 9.7 MG/DL — SIGNIFICANT CHANGE UP (ref 8.4–10.5)
CHLORIDE SERPL-SCNC: 105 MMOL/L — SIGNIFICANT CHANGE UP (ref 96–108)
CO2 SERPL-SCNC: 26 MMOL/L — SIGNIFICANT CHANGE UP (ref 22–31)
CREAT SERPL-MCNC: 1.25 MG/DL — SIGNIFICANT CHANGE UP (ref 0.5–1.3)
GLUCOSE SERPL-MCNC: 122 MG/DL — HIGH (ref 70–99)
HCT VFR BLD CALC: 42.2 % — SIGNIFICANT CHANGE UP (ref 34.5–45)
HGB BLD-MCNC: 14.2 G/DL — SIGNIFICANT CHANGE UP (ref 11.5–15.5)
MCHC RBC-ENTMCNC: 30.3 PG — SIGNIFICANT CHANGE UP (ref 27–34)
MCHC RBC-ENTMCNC: 33.6 GM/DL — SIGNIFICANT CHANGE UP (ref 32–36)
MCV RBC AUTO: 90 FL — SIGNIFICANT CHANGE UP (ref 80–100)
NRBC # BLD: 0 /100 WBCS — SIGNIFICANT CHANGE UP (ref 0–0)
PLATELET # BLD AUTO: 140 K/UL — LOW (ref 150–400)
POTASSIUM SERPL-MCNC: 3.8 MMOL/L — SIGNIFICANT CHANGE UP (ref 3.5–5.3)
POTASSIUM SERPL-SCNC: 3.8 MMOL/L — SIGNIFICANT CHANGE UP (ref 3.5–5.3)
PROT SERPL-MCNC: 7 G/DL — SIGNIFICANT CHANGE UP (ref 6–8.3)
RBC # BLD: 4.69 M/UL — SIGNIFICANT CHANGE UP (ref 3.8–5.2)
RBC # FLD: 12.6 % — SIGNIFICANT CHANGE UP (ref 10.3–14.5)
SODIUM SERPL-SCNC: 144 MMOL/L — SIGNIFICANT CHANGE UP (ref 135–145)
WBC # BLD: 6.16 K/UL — SIGNIFICANT CHANGE UP (ref 3.8–10.5)
WBC # FLD AUTO: 6.16 K/UL — SIGNIFICANT CHANGE UP (ref 3.8–10.5)

## 2020-06-22 PROCEDURE — C1887: CPT

## 2020-06-22 PROCEDURE — 80053 COMPREHEN METABOLIC PANEL: CPT

## 2020-06-22 PROCEDURE — 93010 ELECTROCARDIOGRAM REPORT: CPT

## 2020-06-22 PROCEDURE — 85027 COMPLETE CBC AUTOMATED: CPT

## 2020-06-22 PROCEDURE — C1894: CPT

## 2020-06-22 PROCEDURE — 93456 R HRT CORONARY ARTERY ANGIO: CPT

## 2020-06-22 PROCEDURE — 93005 ELECTROCARDIOGRAM TRACING: CPT

## 2020-06-22 PROCEDURE — 93456 R HRT CORONARY ARTERY ANGIO: CPT | Mod: 26

## 2020-06-22 PROCEDURE — 93010 ELECTROCARDIOGRAM REPORT: CPT | Mod: 77

## 2020-07-05 LAB
BASOPHILS # BLD AUTO: 0.06 K/UL
BASOPHILS NFR BLD AUTO: 1 %
EOSINOPHIL # BLD AUTO: 0.23 K/UL
EOSINOPHIL NFR BLD AUTO: 3.7 %
HCT VFR BLD CALC: 43 %
HGB BLD-MCNC: 14 G/DL
IMM GRANULOCYTES NFR BLD AUTO: 0.3 %
LYMPHOCYTES # BLD AUTO: 1.56 K/UL
LYMPHOCYTES NFR BLD AUTO: 25.3 %
MAN DIFF?: NORMAL
MCHC RBC-ENTMCNC: 30 PG
MCHC RBC-ENTMCNC: 32.6 GM/DL
MCV RBC AUTO: 92.3 FL
MONOCYTES # BLD AUTO: 0.36 K/UL
MONOCYTES NFR BLD AUTO: 5.8 %
NEUTROPHILS # BLD AUTO: 3.94 K/UL
NEUTROPHILS NFR BLD AUTO: 63.9 %
NT-PROBNP SERPL-MCNC: 358 PG/ML
PLATELET # BLD AUTO: 152 K/UL
RBC # BLD: 4.66 M/UL
RBC # FLD: 12.9 %
WBC # FLD AUTO: 6.17 K/UL

## 2020-07-10 ENCOUNTER — RESULT REVIEW (OUTPATIENT)
Age: 80
End: 2020-07-10

## 2020-07-10 ENCOUNTER — APPOINTMENT (OUTPATIENT)
Dept: CARDIOLOGY | Facility: CLINIC | Age: 80
End: 2020-07-10

## 2020-07-10 ENCOUNTER — OUTPATIENT (OUTPATIENT)
Dept: OUTPATIENT SERVICES | Facility: HOSPITAL | Age: 80
LOS: 1 days | End: 2020-07-10
Payer: MEDICARE

## 2020-07-10 DIAGNOSIS — R07.9 CHEST PAIN, UNSPECIFIED: ICD-10-CM

## 2020-07-10 PROCEDURE — 75572 CT HRT W/3D IMAGE: CPT | Mod: 26

## 2020-07-10 PROCEDURE — 75572 CT HRT W/3D IMAGE: CPT

## 2020-07-23 ENCOUNTER — APPOINTMENT (OUTPATIENT)
Dept: ELECTROPHYSIOLOGY | Facility: CLINIC | Age: 80
End: 2020-07-23
Payer: MEDICARE

## 2020-07-23 ENCOUNTER — NON-APPOINTMENT (OUTPATIENT)
Age: 80
End: 2020-07-23

## 2020-07-23 VITALS
SYSTOLIC BLOOD PRESSURE: 145 MMHG | OXYGEN SATURATION: 96 % | HEIGHT: 65 IN | HEART RATE: 74 BPM | DIASTOLIC BLOOD PRESSURE: 82 MMHG | BODY MASS INDEX: 35.82 KG/M2 | WEIGHT: 215 LBS

## 2020-07-23 DIAGNOSIS — I45.10 UNSPECIFIED RIGHT BUNDLE-BRANCH BLOCK: ICD-10-CM

## 2020-07-23 PROCEDURE — 99205 OFFICE O/P NEW HI 60 MIN: CPT

## 2020-07-23 PROCEDURE — 93000 ELECTROCARDIOGRAM COMPLETE: CPT

## 2020-07-23 NOTE — HISTORY OF PRESENT ILLNESS
[FreeTextEntry1] : This is a 79 year old female presenting today for consultation of possible pacemaker. PMH of hypertension, hyperlipidemia and aortic stenosis. She is anticipating TAVR procedure, which is scheduled on Aug 10th, 2020. EKGs showed conduction disease likely requiring pacemaker post TAVR. She presents today for further discussion.\par \par She reports tiredness and fatigue that’s been ongoing since last month. She tolerates her daily activities. However, due to her lingering fatigue, she has minimized some of her activities. She denies any chest pain, SOB, palpitations, lightheadedness or dizziness.

## 2020-07-23 NOTE — PHYSICAL EXAM
[General Appearance - Well Developed] : well developed [Well Groomed] : well groomed [Normal Appearance] : normal appearance [No Deformities] : no deformities [General Appearance - Well Nourished] : well nourished [General Appearance - In No Acute Distress] : no acute distress [Normal Conjunctiva] : the conjunctiva exhibited no abnormalities [Eyelids - No Xanthelasma] : the eyelids demonstrated no xanthelasmas [Normal Oral Mucosa] : normal oral mucosa [No Oral Pallor] : no oral pallor [No Oral Cyanosis] : no oral cyanosis [Normal Jugular Venous A Waves Present] : normal jugular venous A waves present [Normal Jugular Venous V Waves Present] : normal jugular venous V waves present [No Jugular Venous Whittaker A Waves] : no jugular venous whittaker A waves [Heart Rate And Rhythm] : heart rate and rhythm were normal [Arterial Pulses Normal] : the arterial pulses were normal [Respiration, Rhythm And Depth] : normal respiratory rhythm and effort [Exaggerated Use Of Accessory Muscles For Inspiration] : no accessory muscle use [Bowel Sounds] : normal bowel sounds [Abnormal Walk] : normal gait [Nail Clubbing] : no clubbing of the fingernails [Cyanosis, Localized] : no localized cyanosis [Petechial Hemorrhages (___cm)] : no petechial hemorrhages [Skin Color & Pigmentation] : normal skin color and pigmentation [] : no rash [No Venous Stasis] : no venous stasis [Skin Lesions] : no skin lesions [No Skin Ulcers] : no skin ulcer [No Xanthoma] : no  xanthoma was observed [Oriented To Time, Place, And Person] : oriented to person, place, and time [Mood] : the mood was normal [Affect] : the affect was normal [No Anxiety] : not feeling anxious [Systolic grade ___/6] : A grade [unfilled]/6 systolic murmur was heard. [FreeTextEntry1] : slight lower extremity edema

## 2020-07-23 NOTE — DISCUSSION/SUMMARY
[FreeTextEntry1] : Ms Blackman is a very pleasant 79 woman with severe AS anticipating TAVR.  We discussed that with her underlying conduction disease (incomplete RBBB/LAHB) there is increased risk for her needing permanent pacing post procedure.  While hopefully she will NOT need one we discussed the procedure in detail.  All of her questions were answered.

## 2020-08-11 ENCOUNTER — OUTPATIENT (OUTPATIENT)
Dept: OUTPATIENT SERVICES | Facility: HOSPITAL | Age: 80
LOS: 1 days | End: 2020-08-11
Payer: MEDICARE

## 2020-08-11 ENCOUNTER — APPOINTMENT (OUTPATIENT)
Dept: ULTRASOUND IMAGING | Facility: HOSPITAL | Age: 80
End: 2020-08-11

## 2020-08-11 VITALS
HEIGHT: 65 IN | TEMPERATURE: 96 F | DIASTOLIC BLOOD PRESSURE: 78 MMHG | HEART RATE: 96 BPM | SYSTOLIC BLOOD PRESSURE: 133 MMHG | WEIGHT: 218.92 LBS | RESPIRATION RATE: 16 BRPM | OXYGEN SATURATION: 96 %

## 2020-08-11 DIAGNOSIS — I10 ESSENTIAL (PRIMARY) HYPERTENSION: ICD-10-CM

## 2020-08-11 DIAGNOSIS — Z29.9 ENCOUNTER FOR PROPHYLACTIC MEASURES, UNSPECIFIED: ICD-10-CM

## 2020-08-11 DIAGNOSIS — I35.0 NONRHEUMATIC AORTIC (VALVE) STENOSIS: ICD-10-CM

## 2020-08-11 DIAGNOSIS — Z98.890 OTHER SPECIFIED POSTPROCEDURAL STATES: Chronic | ICD-10-CM

## 2020-08-11 DIAGNOSIS — Z01.818 ENCOUNTER FOR OTHER PREPROCEDURAL EXAMINATION: ICD-10-CM

## 2020-08-11 DIAGNOSIS — Z98.49 CATARACT EXTRACTION STATUS, UNSPECIFIED EYE: Chronic | ICD-10-CM

## 2020-08-11 LAB
A1C WITH ESTIMATED AVERAGE GLUCOSE RESULT: 5.4 % — SIGNIFICANT CHANGE UP (ref 4–5.6)
ANION GAP SERPL CALC-SCNC: 14 MMOL/L — SIGNIFICANT CHANGE UP (ref 5–17)
BLD GP AB SCN SERPL QL: NEGATIVE — SIGNIFICANT CHANGE UP
BUN SERPL-MCNC: 28 MG/DL — HIGH (ref 7–23)
CALCIUM SERPL-MCNC: 9.8 MG/DL — SIGNIFICANT CHANGE UP (ref 8.4–10.5)
CHLORIDE SERPL-SCNC: 105 MMOL/L — SIGNIFICANT CHANGE UP (ref 96–108)
CO2 SERPL-SCNC: 25 MMOL/L — SIGNIFICANT CHANGE UP (ref 22–31)
CREAT SERPL-MCNC: 1.2 MG/DL — SIGNIFICANT CHANGE UP (ref 0.5–1.3)
ESTIMATED AVERAGE GLUCOSE: 108 MG/DL — SIGNIFICANT CHANGE UP (ref 68–114)
GLUCOSE SERPL-MCNC: 110 MG/DL — HIGH (ref 70–99)
HCT VFR BLD CALC: 40.3 % — SIGNIFICANT CHANGE UP (ref 34.5–45)
HGB BLD-MCNC: 13.4 G/DL — SIGNIFICANT CHANGE UP (ref 11.5–15.5)
MCHC RBC-ENTMCNC: 30.2 PG — SIGNIFICANT CHANGE UP (ref 27–34)
MCHC RBC-ENTMCNC: 33.3 GM/DL — SIGNIFICANT CHANGE UP (ref 32–36)
MCV RBC AUTO: 91 FL — SIGNIFICANT CHANGE UP (ref 80–100)
MRSA PCR RESULT.: SIGNIFICANT CHANGE UP
NRBC # BLD: 0 /100 WBCS — SIGNIFICANT CHANGE UP (ref 0–0)
PLATELET # BLD AUTO: 127 K/UL — LOW (ref 150–400)
POTASSIUM SERPL-MCNC: 4.1 MMOL/L — SIGNIFICANT CHANGE UP (ref 3.5–5.3)
POTASSIUM SERPL-SCNC: 4.1 MMOL/L — SIGNIFICANT CHANGE UP (ref 3.5–5.3)
RBC # BLD: 4.43 M/UL — SIGNIFICANT CHANGE UP (ref 3.8–5.2)
RBC # FLD: 12.4 % — SIGNIFICANT CHANGE UP (ref 10.3–14.5)
RH IG SCN BLD-IMP: POSITIVE — SIGNIFICANT CHANGE UP
S AUREUS DNA NOSE QL NAA+PROBE: SIGNIFICANT CHANGE UP
SARS-COV-2 RNA SPEC QL NAA+PROBE: SIGNIFICANT CHANGE UP
SODIUM SERPL-SCNC: 144 MMOL/L — SIGNIFICANT CHANGE UP (ref 135–145)
WBC # BLD: 5.55 K/UL — SIGNIFICANT CHANGE UP (ref 3.8–10.5)
WBC # FLD AUTO: 5.55 K/UL — SIGNIFICANT CHANGE UP (ref 3.8–10.5)

## 2020-08-11 PROCEDURE — 93880 EXTRACRANIAL BILAT STUDY: CPT | Mod: 26

## 2020-08-11 NOTE — H&P PST ADULT - REASON FOR ADMISSION
Right breast cancer at 72  s/p lumpectomy and SLN 9/13/02  s/p radiation  s/p Arimidex 1/03-3/08  Enrolled in [employer],but withdrew after three months complaining of extreme sleepiness. "valve surgery"

## 2020-08-11 NOTE — H&P PST ADULT - ASSESSMENT
GIOVANNII VTE 2.0 SCORE [CLOT updated 2019]    AGE RELATED RISK FACTORS                                                       MOBILITY RELATED FACTORS  [ ] Age 41-60 years                                            (1 Point)                    [ ] Bed rest                                                        (1 Point)  [ ] Age: 61-74 years                                           (2 Points)                  [ ] Plaster cast                                                   (2 Points)  [x ] Age= 75 years                                              (3 Points)                    [ ] Bed bound for more than 72 hours                 (2 Points)    DISEASE RELATED RISK FACTORS                                               GENDER SPECIFIC FACTORS  [ ] Edema in the lower extremities                       (1 Point)              [ ] Pregnancy                                                     (1 Point)  [ ] Varicose veins                                               (1 Point)                     [ ] Post-partum < 6 weeks                                   (1 Point)             [x ] BMI > 25 Kg/m2                                            (1 Point)                     [ ] Hormonal therapy  or oral contraception          (1 Point)                 [ ] Sepsis (in the previous month)                        (1 Point)               [ ] History of pregnancy complications                 (1 point)  [ ] Pneumonia or serious lung disease                                               [ ] Unexplained or recurrent                     (1 Point)           (in the previous month)                               (1 Point)  [ ] Abnormal pulmonary function test                     (1 Point)                 SURGERY RELATED RISK FACTORS  [ ] Acute myocardial infarction                              (1 Point)               [ ]  Section                                             (1 Point)  [ ] Congestive heart failure (in the previous month)  (1 Point)      [ ] Minor surgery                                                  (1 Point)   [ ] Inflammatory bowel disease                             (1 Point)               [ ] Arthroscopic surgery                                        (2 Points)  [ ] Central venous access                                      (2 Points)                [x ] General surgery lasting more than 45 minutes (2 points)  [x ] Malignancy- Present or previous                   (2 Points)                [ ] Elective arthroplasty                                         (5 points)    [ ] Stroke (in the previous month)                          (5 Points)                                                                                                                                                           HEMATOLOGY RELATED FACTORS                                                 TRAUMA RELATED RISK FACTORS  [ ] Prior episodes of VTE                                     (3 Points)                [ ] Fracture of the hip, pelvis, or leg                       (5 Points)  [ ] Positive family history for VTE                         (3 Points)             [ ] Acute spinal cord injury (in the previous month)  (5 Points)  [ ] Prothrombin 45272 A                                     (3 Points)               [ ] Paralysis  (less than 1 month)                             (5 Points)  [ ] Factor V Leiden                                             (3 Points)                  [ ] Multiple Trauma within 1 month                        (5 Points)  [ ] Lupus anticoagulants                                     (3 Points)                                                           [ ] Anticardiolipin antibodies                               (3 Points)                                                       [ ] High homocysteine in the blood                      (3 Points)                                             [ ] Other congenital or acquired thrombophilia      (3 Points)                                                [ ] Heparin induced thrombocytopenia                  (3 Points)                                     Total Score [        8  ]

## 2020-08-11 NOTE — H&P PST ADULT - HISTORY OF PRESENT ILLNESS
heart murmur, most of her life 2013 Dr. Lopez 80 yo female, PMH HTN, HLD, VISHNU (does not use CPAP) right breast CA s/p right lumpectomy 2002, received RT afterwards and Arimidex from [id number], heart murmur most of her life, following up with cardiology since 2013, last Echocardiogram 6/12/20 revealed severe AS and presently patient asymptomatic with DALE. Pt. s/p cardiac cath 6/22/20 without evidence of CAD. Pt. presents to [facility] for scheduled TAVR on 8/13/20. Pt. denies illness during the height of the pandemic, denies close contact with anyone that has been ill, no fever, cough, dyspnea in past two weeks.    COVID-19 testing done today, 8/11/20, at [facility]  CT of the heart from 7/10/20, Labs and carotid dopplers done today 80 yo female, PMH HTN, HLD, VISHNU (does not use CPAP) right breast CA s/p right lumpectomy 2002, received RT afterwards and Arimidex from [id number], heart murmur most of her life, following up with cardiology since 2013, last Echocardiogram 6/12/20 revealed severe AS and presently patient asymptomatic with DALE. Pt. s/p cardiac cath 6/22/20 without evidence of CAD. Pt. had consult with Dr. Pineda since pt. has incomplete Right BBB, she might need PPM as well. Pt. presents to [facility], [place] for scheduled TAVR on 8/13/20. Pt. denies illness during the height of the pandemic, denies close contact with anyone that has been ill, no fever, cough, dyspnea in past two weeks.    COVID-19 testing done today, 8/11/20, at [facility], [place]  CT of the heart from 7/10/20, Labs and carotid dopplers done today

## 2020-08-11 NOTE — H&P PST ADULT - NSICDXPASTMEDICALHX_GEN_ALL_CORE_FT
PAST MEDICAL HISTORY:  Breast CA 2002, right, s/p lumpectomy, RT, Arimidex for 5 years    Hyperlipemia     Hypertension     VISHNU (obstructive sleep apnea) Dx 2015 - does not use CPAP    Severe aortic stenosis

## 2020-08-11 NOTE — H&P PST ADULT - NSICDXPROBLEM_GEN_ALL_CORE_FT
PROBLEM DIAGNOSES  Problem: Hypertension  Assessment and Plan:     Problem: Prophylactic measure  Assessment and Plan: The Caprini score indicates that this patient is at high risk for a VTE event (score 6 or greater). Surgical patients in this group will benefit from both pharmacologic prophylaxis and intermittent compression devices.  The surgical team will determine the balance between VTE risk and bleeding risk, and other clinical considerations PROBLEM DIAGNOSES  Problem: Severe aortic stenosis  Assessment and Plan: TAVR  Pre-op instructions, including Chlorhexidine soap, provided - all questions answered    Problem: Hypertension  Assessment and Plan: Continue BP meds as directed    Problem: Prophylactic measure  Assessment and Plan: The Caprini score indicates that this patient is at high risk for a VTE event (score 6 or greater). Surgical patients in this group will benefit from both pharmacologic prophylaxis and intermittent compression devices.  The surgical team will determine the balance between VTE risk and bleeding risk, and other clinical considerations

## 2020-08-11 NOTE — H&P PST ADULT - NSICDXPASTSURGICALHX_GEN_ALL_CORE_FT
PAST SURGICAL HISTORY:  H/O eye surgery PRK bilateral eyes    S/P cataract surgery bilateral lens implants PAST SURGICAL HISTORY:  H/O eye surgery PRK bilateral eyes    S/P breast lumpectomy 2002, right, for breast CA, followed by RT and Arimidex for 5 years    S/P cataract surgery bilateral lens implants

## 2020-08-11 NOTE — H&P PST ADULT - NSANTHOSAYNRD_GEN_A_CORE
No. VISHNU screening performed.  STOP BANG Legend: 0-2 = LOW Risk; 3-4 = INTERMEDIATE Risk; 5-8 = HIGH Risk Dx 2015/Yes

## 2020-08-13 ENCOUNTER — TRANSCRIPTION ENCOUNTER (OUTPATIENT)
Age: 80
End: 2020-08-13

## 2020-08-13 ENCOUNTER — APPOINTMENT (OUTPATIENT)
Dept: CARDIOTHORACIC SURGERY | Facility: HOSPITAL | Age: 80
End: 2020-08-13

## 2020-08-13 ENCOUNTER — INPATIENT (INPATIENT)
Facility: HOSPITAL | Age: 80
LOS: 2 days | Discharge: HOME | DRG: 267 | End: 2020-08-16
Attending: THORACIC SURGERY (CARDIOTHORACIC VASCULAR SURGERY) | Admitting: THORACIC SURGERY (CARDIOTHORACIC VASCULAR SURGERY)
Payer: MEDICARE

## 2020-08-13 VITALS
HEART RATE: 72 BPM | SYSTOLIC BLOOD PRESSURE: 138 MMHG | WEIGHT: 220.9 LBS | DIASTOLIC BLOOD PRESSURE: 84 MMHG | OXYGEN SATURATION: 96 % | HEIGHT: 65 IN | TEMPERATURE: 98 F | RESPIRATION RATE: 18 BRPM

## 2020-08-13 DIAGNOSIS — Z98.49 CATARACT EXTRACTION STATUS, UNSPECIFIED EYE: Chronic | ICD-10-CM

## 2020-08-13 DIAGNOSIS — Z98.890 OTHER SPECIFIED POSTPROCEDURAL STATES: Chronic | ICD-10-CM

## 2020-08-13 DIAGNOSIS — I35.0 NONRHEUMATIC AORTIC (VALVE) STENOSIS: ICD-10-CM

## 2020-08-13 PROBLEM — G47.33 OBSTRUCTIVE SLEEP APNEA (ADULT) (PEDIATRIC): Chronic | Status: ACTIVE | Noted: 2020-08-11

## 2020-08-13 PROBLEM — E78.5 HYPERLIPIDEMIA, UNSPECIFIED: Chronic | Status: ACTIVE | Noted: 2020-08-11

## 2020-08-13 PROBLEM — I10 ESSENTIAL (PRIMARY) HYPERTENSION: Chronic | Status: ACTIVE | Noted: 2020-08-11

## 2020-08-13 PROBLEM — C50.919 MALIGNANT NEOPLASM OF UNSPECIFIED SITE OF UNSPECIFIED FEMALE BREAST: Chronic | Status: ACTIVE | Noted: 2020-08-11

## 2020-08-13 LAB
ALBUMIN SERPL ELPH-MCNC: 3.7 G/DL — SIGNIFICANT CHANGE UP (ref 3.3–5)
ALP SERPL-CCNC: 76 U/L — SIGNIFICANT CHANGE UP (ref 40–120)
ALT FLD-CCNC: 14 U/L — SIGNIFICANT CHANGE UP (ref 10–45)
ANION GAP SERPL CALC-SCNC: 11 MMOL/L — SIGNIFICANT CHANGE UP (ref 5–17)
APTT BLD: 33.9 SEC — SIGNIFICANT CHANGE UP (ref 27.5–35.5)
AST SERPL-CCNC: 20 U/L — SIGNIFICANT CHANGE UP (ref 10–40)
BASOPHILS # BLD AUTO: 0.03 K/UL — SIGNIFICANT CHANGE UP (ref 0–0.2)
BASOPHILS NFR BLD AUTO: 0.5 % — SIGNIFICANT CHANGE UP (ref 0–2)
BILIRUB SERPL-MCNC: 0.9 MG/DL — SIGNIFICANT CHANGE UP (ref 0.2–1.2)
BUN SERPL-MCNC: 20 MG/DL — SIGNIFICANT CHANGE UP (ref 7–23)
CALCIUM SERPL-MCNC: 9.2 MG/DL — SIGNIFICANT CHANGE UP (ref 8.4–10.5)
CHLORIDE SERPL-SCNC: 106 MMOL/L — SIGNIFICANT CHANGE UP (ref 96–108)
CO2 SERPL-SCNC: 25 MMOL/L — SIGNIFICANT CHANGE UP (ref 22–31)
CREAT SERPL-MCNC: 1.05 MG/DL — SIGNIFICANT CHANGE UP (ref 0.5–1.3)
EOSINOPHIL # BLD AUTO: 0.09 K/UL — SIGNIFICANT CHANGE UP (ref 0–0.5)
EOSINOPHIL NFR BLD AUTO: 1.5 % — SIGNIFICANT CHANGE UP (ref 0–6)
GAS PNL BLDA: SIGNIFICANT CHANGE UP
GLUCOSE BLDC GLUCOMTR-MCNC: 108 MG/DL — HIGH (ref 70–99)
GLUCOSE SERPL-MCNC: 131 MG/DL — HIGH (ref 70–99)
HCT VFR BLD CALC: 37.1 % — SIGNIFICANT CHANGE UP (ref 34.5–45)
HGB BLD-MCNC: 12.2 G/DL — SIGNIFICANT CHANGE UP (ref 11.5–15.5)
IMM GRANULOCYTES NFR BLD AUTO: 2 % — HIGH (ref 0–1.5)
INR BLD: 1.08 RATIO — SIGNIFICANT CHANGE UP (ref 0.88–1.16)
LYMPHOCYTES # BLD AUTO: 1 K/UL — SIGNIFICANT CHANGE UP (ref 1–3.3)
LYMPHOCYTES # BLD AUTO: 17.1 % — SIGNIFICANT CHANGE UP (ref 13–44)
MAGNESIUM SERPL-MCNC: 1.9 MG/DL — SIGNIFICANT CHANGE UP (ref 1.6–2.6)
MCHC RBC-ENTMCNC: 29.8 PG — SIGNIFICANT CHANGE UP (ref 27–34)
MCHC RBC-ENTMCNC: 32.9 GM/DL — SIGNIFICANT CHANGE UP (ref 32–36)
MCV RBC AUTO: 90.5 FL — SIGNIFICANT CHANGE UP (ref 80–100)
MONOCYTES # BLD AUTO: 0.35 K/UL — SIGNIFICANT CHANGE UP (ref 0–0.9)
MONOCYTES NFR BLD AUTO: 6 % — SIGNIFICANT CHANGE UP (ref 2–14)
NEUTROPHILS # BLD AUTO: 4.27 K/UL — SIGNIFICANT CHANGE UP (ref 1.8–7.4)
NEUTROPHILS NFR BLD AUTO: 72.9 % — SIGNIFICANT CHANGE UP (ref 43–77)
NRBC # BLD: 0 /100 WBCS — SIGNIFICANT CHANGE UP (ref 0–0)
PHOSPHATE SERPL-MCNC: 3.1 MG/DL — SIGNIFICANT CHANGE UP (ref 2.5–4.5)
PLATELET # BLD AUTO: 104 K/UL — LOW (ref 150–400)
POTASSIUM SERPL-MCNC: 3.6 MMOL/L — SIGNIFICANT CHANGE UP (ref 3.5–5.3)
POTASSIUM SERPL-SCNC: 3.6 MMOL/L — SIGNIFICANT CHANGE UP (ref 3.5–5.3)
PROT SERPL-MCNC: 6.2 G/DL — SIGNIFICANT CHANGE UP (ref 6–8.3)
PROTHROM AB SERPL-ACNC: 12.8 SEC — SIGNIFICANT CHANGE UP (ref 10.6–13.6)
RBC # BLD: 4.1 M/UL — SIGNIFICANT CHANGE UP (ref 3.8–5.2)
RBC # FLD: 12.7 % — SIGNIFICANT CHANGE UP (ref 10.3–14.5)
RH IG SCN BLD-IMP: POSITIVE — SIGNIFICANT CHANGE UP
SODIUM SERPL-SCNC: 142 MMOL/L — SIGNIFICANT CHANGE UP (ref 135–145)
WBC # BLD: 5.86 K/UL — SIGNIFICANT CHANGE UP (ref 3.8–10.5)
WBC # FLD AUTO: 5.86 K/UL — SIGNIFICANT CHANGE UP (ref 3.8–10.5)

## 2020-08-13 PROCEDURE — 93010 ELECTROCARDIOGRAM REPORT: CPT

## 2020-08-13 PROCEDURE — 71045 X-RAY EXAM CHEST 1 VIEW: CPT | Mod: 26

## 2020-08-13 PROCEDURE — 93355 ECHO TRANSESOPHAGEAL (TEE): CPT

## 2020-08-13 PROCEDURE — 33361 REPLACE AORTIC VALVE PERQ: CPT | Mod: Q0,62

## 2020-08-13 PROCEDURE — 99291 CRITICAL CARE FIRST HOUR: CPT

## 2020-08-13 PROCEDURE — 33361 REPLACE AORTIC VALVE PERQ: CPT | Mod: 62,Q0

## 2020-08-13 RX ORDER — SODIUM CHLORIDE 9 MG/ML
3 INJECTION INTRAMUSCULAR; INTRAVENOUS; SUBCUTANEOUS EVERY 8 HOURS
Refills: 0 | Status: DISCONTINUED | OUTPATIENT
Start: 2020-08-13 | End: 2020-08-13

## 2020-08-13 RX ORDER — HYDROCHLOROTHIAZIDE 25 MG
12.5 TABLET ORAL DAILY
Refills: 0 | Status: DISCONTINUED | OUTPATIENT
Start: 2020-08-13 | End: 2020-08-16

## 2020-08-13 RX ORDER — NICARDIPINE HYDROCHLORIDE 30 MG/1
5 CAPSULE, EXTENDED RELEASE ORAL
Qty: 40 | Refills: 0 | Status: DISCONTINUED | OUTPATIENT
Start: 2020-08-13 | End: 2020-08-13

## 2020-08-13 RX ORDER — LOSARTAN POTASSIUM 100 MG/1
100 TABLET, FILM COATED ORAL DAILY
Refills: 0 | Status: DISCONTINUED | OUTPATIENT
Start: 2020-08-13 | End: 2020-08-16

## 2020-08-13 RX ORDER — HEPARIN SODIUM 5000 [USP'U]/ML
5000 INJECTION INTRAVENOUS; SUBCUTANEOUS EVERY 8 HOURS
Refills: 0 | Status: DISCONTINUED | OUTPATIENT
Start: 2020-08-13 | End: 2020-08-16

## 2020-08-13 RX ORDER — MAGNESIUM SULFATE 500 MG/ML
2 VIAL (ML) INJECTION ONCE
Refills: 0 | Status: COMPLETED | OUTPATIENT
Start: 2020-08-13 | End: 2020-08-13

## 2020-08-13 RX ORDER — PANTOPRAZOLE SODIUM 20 MG/1
40 TABLET, DELAYED RELEASE ORAL
Refills: 0 | Status: DISCONTINUED | OUTPATIENT
Start: 2020-08-13 | End: 2020-08-16

## 2020-08-13 RX ORDER — ATORVASTATIN CALCIUM 80 MG/1
10 TABLET, FILM COATED ORAL AT BEDTIME
Refills: 0 | Status: DISCONTINUED | OUTPATIENT
Start: 2020-08-13 | End: 2020-08-16

## 2020-08-13 RX ORDER — POTASSIUM CHLORIDE 20 MEQ
20 PACKET (EA) ORAL ONCE
Refills: 0 | Status: COMPLETED | OUTPATIENT
Start: 2020-08-13 | End: 2020-08-13

## 2020-08-13 RX ORDER — POTASSIUM CHLORIDE 20 MEQ
20 PACKET (EA) ORAL
Refills: 0 | Status: COMPLETED | OUTPATIENT
Start: 2020-08-13 | End: 2020-08-13

## 2020-08-13 RX ORDER — CLOPIDOGREL BISULFATE 75 MG/1
75 TABLET, FILM COATED ORAL EVERY 24 HOURS
Refills: 0 | Status: DISCONTINUED | OUTPATIENT
Start: 2020-08-13 | End: 2020-08-16

## 2020-08-13 RX ORDER — ASPIRIN/CALCIUM CARB/MAGNESIUM 324 MG
81 TABLET ORAL DAILY
Refills: 0 | Status: DISCONTINUED | OUTPATIENT
Start: 2020-08-13 | End: 2020-08-16

## 2020-08-13 RX ORDER — CHLORHEXIDINE GLUCONATE 213 G/1000ML
1 SOLUTION TOPICAL
Refills: 0 | Status: DISCONTINUED | OUTPATIENT
Start: 2020-08-13 | End: 2020-08-16

## 2020-08-13 RX ORDER — POTASSIUM CHLORIDE 20 MEQ
20 PACKET (EA) ORAL
Refills: 0 | Status: DISCONTINUED | OUTPATIENT
Start: 2020-08-13 | End: 2020-08-13

## 2020-08-13 RX ORDER — CEFUROXIME AXETIL 250 MG
1500 TABLET ORAL ONCE
Refills: 0 | Status: DISCONTINUED | OUTPATIENT
Start: 2020-08-13 | End: 2020-08-13

## 2020-08-13 RX ORDER — LIDOCAINE HCL 20 MG/ML
0.2 VIAL (ML) INJECTION ONCE
Refills: 0 | Status: DISCONTINUED | OUTPATIENT
Start: 2020-08-13 | End: 2020-08-13

## 2020-08-13 RX ADMIN — Medication 81 MILLIGRAM(S): at 21:22

## 2020-08-13 RX ADMIN — LOSARTAN POTASSIUM 100 MILLIGRAM(S): 100 TABLET, FILM COATED ORAL at 17:53

## 2020-08-13 RX ADMIN — ATORVASTATIN CALCIUM 10 MILLIGRAM(S): 80 TABLET, FILM COATED ORAL at 21:19

## 2020-08-13 RX ADMIN — HEPARIN SODIUM 5000 UNIT(S): 5000 INJECTION INTRAVENOUS; SUBCUTANEOUS at 21:17

## 2020-08-13 RX ADMIN — NICARDIPINE HYDROCHLORIDE 25 MG/HR: 30 CAPSULE, EXTENDED RELEASE ORAL at 13:14

## 2020-08-13 RX ADMIN — Medication 20 MILLIEQUIVALENT(S): at 18:26

## 2020-08-13 RX ADMIN — Medication 100 MILLIEQUIVALENT(S): at 14:39

## 2020-08-13 RX ADMIN — Medication 50 GRAM(S): at 14:17

## 2020-08-13 RX ADMIN — CLOPIDOGREL BISULFATE 75 MILLIGRAM(S): 75 TABLET, FILM COATED ORAL at 14:17

## 2020-08-13 RX ADMIN — Medication 20 MILLIEQUIVALENT(S): at 17:34

## 2020-08-13 NOTE — CONSULT NOTE ADULT - SUBJECTIVE AND OBJECTIVE BOX
CHIEF COMPLAINT:    HISTORY OF PRESENT ILLNESS  80y/o female h/o HTN, HLD, NL LVEF, AS presents today for TAVR and post TAVR developed transient LBBB which has since resolved. EKG pre TAVR shows LAHB. Post TAVR patient had a LBBB but by later in the afternoon today LBBB resolved. On telemetry patient with SR 80's bpm with PVC's. No AVB or significant bradycardia seen on telemetry. Patient with temporary TVP but not requiring it.   Patient is without complaints. She denies CP, palps, SOB, lightheadedness or syncope.  Patient is not on any AV stacy blocking agents    Allergies  No Known Allergies  	    MEDICATIONS:  aspirin enteric coated 81 milliGRAM(s) Oral daily  clopidogrel Tablet 75 milliGRAM(s) Oral every 24 hours  heparin   Injectable 5000 Unit(s) SubCutaneous every 8 hours  niCARdipine Infusion 5 mG/Hr IV Continuous <Continuous>  pantoprazole    Tablet 40 milliGRAM(s) Oral before breakfast  atorvastatin 10 milliGRAM(s) Oral at bedtime  chlorhexidine 4% Liquid 1 Application(s) Topical <User Schedule>  potassium chloride    Tablet ER 20 milliEquivalent(s) Oral every 2 hours      PAST MEDICAL & SURGICAL HISTORY:  VISHNU (obstructive sleep apnea): Dx 2015 - does not use CPAP  Severe aortic stenosis  Hyperlipemia  Hypertension  Breast CA: 2002, right, s/p lumpectomy, RT, Arimidex for 5 years  S/P breast lumpectomy: 2002, right, for breast CA, followed by RT and Arimidex for 5 years  S/P cataract surgery: bilateral lens implants  H/O eye surgery: PRK bilateral eyes      FAMILY HISTORY:  N/C    SOCIAL HISTORY:    No ETOH abuse, tobacco or illicit drug use      REVIEW OF SYSTEMS:  See HPI. Otherwise, 10 point ROS done and otherwise negative.    PHYSICAL EXAM:  T(C): 36.3 (08-13-20 @ 14:00), Max: 36.7 (08-13-20 @ 08:43)  HR: 86 (08-13-20 @ 14:30) (72 - 90)  BP: 152/59 (08-13-20 @ 13:00) (138/84 - 152/59)  RR: 23 (08-13-20 @ 14:30) (17 - 26)  SpO2: 97% (08-13-20 @ 14:30) (96% - 99%)  Wt(kg): --  I&O's Summary    13 Aug 2020 07:01  -  13 Aug 2020 15:53  --------------------------------------------------------  IN: 75 mL / OUT: 200 mL / NET: -125 mL        Appearance: Alert. NAD	  HEENT:   NC/AT	  Cardiovascular: +S1S2 RRR no m/g/r  Respiratory: CTA B/L	  Psychiatry: A & O x 3, Mood & affect appropriate  Gastrointestinal:  Soft, NT.ND., + BS	  Neurologic: Non-focal  Extremities: No edema BLE  Vascular: Peripheral pulses palpable 2+ bilaterally      LABS:	 	    CBC Full  -  ( 13 Aug 2020 13:19 )  WBC Count : 5.86 K/uL  Hemoglobin : 12.2 g/dL  Hematocrit : 37.1 %  Platelet Count - Automated : 104 K/uL  Mean Cell Volume : 90.5 fl  Mean Cell Hemoglobin : 29.8 pg  Mean Cell Hemoglobin Concentration : 32.9 gm/dL  Auto Neutrophil # : 4.27 K/uL  Auto Lymphocyte # : 1.00 K/uL  Auto Monocyte # : 0.35 K/uL  Auto Eosinophil # : 0.09 K/uL  Auto Basophil # : 0.03 K/uL  Auto Neutrophil % : 72.9 %  Auto Lymphocyte % : 17.1 %  Auto Monocyte % : 6.0 %  Auto Eosinophil % : 1.5 %  Auto Basophil % : 0.5 %    08-13    142  |  106  |  20  ----------------------------<  131<H>  3.6   |  25  |  1.05    Ca    9.2      13 Aug 2020 13:19  Phos  3.1     08-13  Mg     1.9     08-13    TPro  6.2  /  Alb  3.7  /  TBili  0.9  /  DBili  x   /  AST  20  /  ALT  14  /  AlkPhos  76  08-13    TELEMETRY: SR 80's bpm; PVC's    ECG(pre-op): SR @ 84bpm; LAHB; QRSd: 110ms; DC: 160ms  ECG(post-op#1 @ 13:52pm): SR; LBBB; left axis; HR 83bpm; QRSd: 154ms; DC: 208ms  ECG(pos-op#2 @ 15:36pm): SR; left axis; QRSd: 100ms; DC: 196ms	      PREVIOUS DIAGNOSTIC TESTING:    Echocardiogram(6/12/2020):  Conclusions:  1. Calcified trileaflet aortic valve with decreased  opening. Peak transaortic valve gradient equals 36 mm Hg,  mean transaortic valve gradient equals 23 mm Hg, estimated  aortic valve area equals 0.7 sqcm (by continuity equation),  aortic valve velocity time integral equals 59 cm,  consistent with severe aortic stenosis.  2. Moderate concentric left ventricular hypertrophy.  3. Normal left ventricular systolic function.  Stroke volume index 27 ml/meter square.  Findings  consistent with low gradient severe aortic stenosis with  preserved ejection fraction.  ------------------------------------------------------------------------  Confirmed on  6/12/2020 - 14:32:12 by Vazquez Stuart M.D.      Catheterization(6/22/2020):  VENTRICLES: There was no diagnostic evidence of left ventricular regional  wall motion abnormalities. Global left ventricular function was normal. EF  by echo was 60 %. The left ventricle was normal in size.  VALVES: AORTIC VALVE: There was severe aortic stenosis. No significant  aortic valve gradient. MITRAL VALVE: The mitral valve exhibited no  regurgitation.  CORONARY VESSELS: The coronary circulation is right dominant. There was no  angiographic evidence for occlusive coronary artery disease.  LM:   --  LM: The vessel was medium sized. Angiography showed no evidence  of disease.  LAD:   --  LAD: The vessel was medium to large sized.Angiography showed no  evidence of disease.  CX:   --  Circumflex: The vessel was medium sized. Angiography showed no  evidence of disease.  RCA:   --  RCA: The vessel was medium to large sized. Angiography showed no  evidence of disease.  AORTA: Theroot exhibited normal size.  COMPLICATIONS: There were no complications.  SUMMARY:  CORONARY VESSELS: There was no angiographic evidence for occlusive coronary  artery disease.  CARDIAC STRUCTURES: Global left ventricular function was normal. EF by echo  was 60 %.  	  ASSESSMENT/PLAN: 	  80y/o female h/o HTN, HLD, NL LVEF, AS, LAHB s/p TAVR 8/13 c/b transient LBBB which has since resolved.   1. AS s/p TAVR 8/13 c/b transient LBBB which has since resolved  2. LAHB pre TAVR  3. NL LVEF  4. HTN    --LBBB resolved. No AVB or significant bradycardia seen on telemetry. Recommend to remove TVP today.  --Continue to observe on telemetry. If no significant bradycardia or AVB/conduction disease seen, no PPM required.  --Daily EKG    Gianna Carbajal PA-C  [id number]

## 2020-08-13 NOTE — H&P ADULT - HISTORY OF PRESENT ILLNESS
The patient is an 79-year-old woman with a past medical history of hypertension and hyperlipidemia who presented to the hospital today for trans-catheter aortic valve replacement. The patient had a known history of ventricular conduction delay with a documented history of right bundle branch block and left anterior fascicular block (left anterior fascicular fascicular block noted on EKG in June 2020). Subsequent to her procedure, the patient was noted to be bradycardic, with a junctional rhythm, and hypotensive, had a transvenous pacer placed, and was transferred to the CCU for further management. The patient had a coronary artery catheterization in June 2020, which demonstrated no evidence of obstructed flow in the coronary arteries. She has no documented history of AV-stacy conduction disease. Other than her need for an aortic valve replacement, the patient has no known personal or family history of cardiac disease. The patient is an 79-year-old woman with a past medical history of severe-symptomatic aortic stenosis, hypertension, and hyperlipidemia who presented to the hospital today for trans-catheter aortic valve replacement. The patient had a known history of ventricular conduction delay with a documented history of right bundle branch block and left anterior fascicular block (left anterior fascicular fascicular block noted on EKG in June 2020). Subsequent to her procedure, the patient was noted to be bradycardic, with a junctional rhythm, and hypotensive, had a transvenous pacer placed, and was transferred to the CCU for further management. The patient had a coronary artery catheterization in June 2020, which demonstrated no evidence of obstructed flow in the coronary arteries. She has no documented history of AV-stacy conduction disease. Other than her need for an aortic valve replacement, the patient has no known personal or family history of cardiac disease.

## 2020-08-13 NOTE — H&P ADULT - NSHPSOCIALHISTORY_GEN_ALL_CORE
The patient drinks socially, has a glass of wine with dinner most nights of the week. She denies a history of smoking, and she has never used illicit drugs.

## 2020-08-13 NOTE — H&P ADULT - ASSESSMENT
The patient is an 79-year-old woman with a past medical history ====================ASSESSMENT AND PLAN==============  The patient is an 79-year-old woman with a past medical history of severe-symptomatic aortic stenosis, ventricular conduction disease at baseline, hypertension, and hyperlipidemia who is admitted to the CCU for hemodynamic monitoring following the development of self-resolving junctional bradycardia following transcatheter aortic valve replacement.      ====================CARDIOVASCULAR==================    Mechaincal Circulatory Support: None     ==Hemodynamics==  -The patient was hypertensive to the 150s-160s systolic on transfer to the CCU and was started on a nicardipine drip  -The patient's blood pressure is now measuring in the 110s systolic; will stop nicardipine drip and transition to oral hydrochlorothiazide 12.5 mg and losartan 100 mg (home medications)     Preload (fluids, diuretics): losartan 100 mg; HCTZ 12.5 mg   Afterload (anti-hypertensives, pressors): losartan 100 mg     ==Pump==  - Hemodynamically stable at this time subsequent to aortic valve replacement     Echo Date: 6/12/20; sever aortic stenosis diagnosed   LVEF: normal left ventricular systolic function                      Regional Wall Motion Abnormaility?:  [ ]Yes   [x] No, If Yes, Details  Diastolic function: left ventricular diastolic dysfunction noted   RV function: normal   Volume status: 1+ lower extremity pitting edema on exam     ==Coronaries==  -No known coronary artery disease; no change in management indicated     Last ischemic workup: 6/22/20; clean coronary arteries     ==Rhythm==  -Sinus rhythm noted on telemetry subsequent to transfer to CCU; no junctional rhythm noted; will remove transvenous pacer     Current rhythm: sinus rhythm   AM EKG Interpretation: sinus rhythm with left anterior fascicular block noted from June 2020   Anti-arrhythmic therapies: none   TVP with settings: 10 mA, 40 beats per minute; will remove tvp today     niCARdipine Infusion 5 mG/Hr (25 mL/Hr) IV Continuous <Continuous>      ====================== NEUROLOGY=====================  Sedation [ ]Yes   [x] No  Delirium [ ]Yes   [x] No    -Alert and oriented at this time; no acute intervention necessary   ==================== RESPIRATORY======================  -The patient was transferred to the CCU on 2L nasal cannula; saturating greater than 95% on room air trial; will transition to room air     ABG - ( 13 Aug 2020 13:10 )  pH, Arterial: 7.40  pH, Blood: x     /  pCO2: 45    /  pO2: 117   / HCO3: 27    / Base Excess: 2.1   /  SaO2: 98                  Blood Gas Arterial - Lytes,H+H,iCa,Lact: Performed In Lab (08-13-20 @ 13:10)        ===================== RENAL =========================  -Creatinine at baseline; electrolytes all within normal limits; will monitor intake and output     08-13-20 @ 07:01  -  08-13-20 @ 17:27  --------------------------------------------------------  IN: 285 mL / OUT: 350 mL / NET: -65 mL      ==================== GASTROINTESTINAL===================  -The patient's last bowel movement was yesterday prior to admission  -Patient is on a dash diet  -No active concerns     Indication for Stress Ulcer Prophylaxis, [ ] Yes    [x] No   If Yes, Medication:     pantoprazole    Tablet 40 milliGRAM(s) Oral before breakfast  potassium chloride    Tablet ER 20 milliEquivalent(s) Oral every 2 hours    ========================INFECTIOUS DISEASE================  -white blood cell count not elevated; no fevers; no concerns at this time     T(C): 36.3 (08-13-20 @ 14:00), Max: 36.7 (08-13-20 @ 08:43)  WBC Count: 5.86 K/uL (08-13-20 @ 13:19)  WBC Count: 5.55 K/uL (08-11-20 @ 10:42)    ===================HEMATOLOGIC/ONC ===================  -Currently on heparin for dvt prophylaxis   -Continue with dual antiplatelet therapy in the setting of tavr today     Hemoglobin: 12.2 g/dL (08-13-20 @ 13:19)  Hemoglobin: 13.4 g/dL (08-11-20 @ 10:42)    Platelet Count - Automated: 104 K/uL (08-13-20 @ 13:19)  Platelet Count - Automated: 127 K/uL (08-11-20 @ 10:42)    Chemical VTE Prophylaxis:  [ ] Lovenox    [x] SQH   [ ]NA  Systemic Anticogaulation:  [ ] Yes    [x] No,  If Yes, Medication and Indication:     aspirin enteric coated 81 milliGRAM(s) Oral daily  clopidogrel Tablet 75 milliGRAM(s) Oral every 24 hours  heparin   Injectable 5000 Unit(s) SubCutaneous every 8 hours    =======================    ENDOCRINE  =====================  -Hgb A1c 5.4; no acute concerns at this time     POCT Blood Glucose.: 108 mg/dL (08-13-20 @ 08:20)        atorvastatin 10 milliGRAM(s) Oral at bedtime    ======================= LINES/TUBES  =====================  Arterial Line: placed 8/13     Disposition: Will plan for transition to telemetry floor following transition to oral anti-hypertensive medications and removal of trans-venous pacer ====================ASSESSMENT AND PLAN==============  The patient is an 79-year-old woman with a past medical history of severe-symptomatic aortic stenosis, ventricular conduction disease at baseline, hypertension, and hyperlipidemia who is admitted to the CCU for hemodynamic monitoring following the development of self-resolving junctional bradycardia following transcatheter aortic valve replacement.      ====================CARDIOVASCULAR==================    Mechaincal Circulatory Support: None     ==Hemodynamics==  -The patient was hypertensive to the 150s-160s systolic on transfer to the CCU and was started on a nicardipine drip  -The patient's blood pressure is now measuring in the 110s systolic; will stop nicardipine drip and transition to oral hydrochlorothiazide 12.5 mg and losartan 100 mg (home medications)     Preload (fluids, diuretics): losartan 100 mg; HCTZ 12.5 mg   Afterload (anti-hypertensives, pressors): losartan 100 mg     ==Pump==  - Hemodynamically stable at this time subsequent to aortic valve replacement     Echo Date: 6/12/20; sever aortic stenosis diagnosed   LVEF: normal left ventricular systolic function                      Regional Wall Motion Abnormaility?:  [ ]Yes   [x] No, If Yes, Details  Diastolic function: left ventricular diastolic dysfunction noted   RV function: normal   Volume status: 1+ lower extremity pitting edema on exam     ==Coronaries==  -No known coronary artery disease; no change in management indicated     Last ischemic workup: 6/22/20; clean coronary arteries     ==Rhythm==  -Sinus rhythm noted on telemetry subsequent to transfer to CCU; no junctional rhythm noted; will remove transvenous pacer   -No indication for permanent pacemaker placement at this time     Current rhythm: sinus rhythm   AM EKG Interpretation: sinus rhythm with left anterior fascicular block noted from June 2020   Anti-arrhythmic therapies: none   TVP with settings: 10 mA, 40 beats per minute; will remove tvp today     niCARdipine Infusion 5 mG/Hr (25 mL/Hr) IV Continuous <Continuous>      ====================== NEUROLOGY=====================  Sedation [ ]Yes   [x] No  Delirium [ ]Yes   [x] No    -Alert and oriented at this time; no acute intervention necessary   ==================== RESPIRATORY======================  -The patient was transferred to the CCU on 2L nasal cannula; saturating greater than 95% on room air trial; will transition to room air     ABG - ( 13 Aug 2020 13:10 )  pH, Arterial: 7.40  pH, Blood: x     /  pCO2: 45    /  pO2: 117   / HCO3: 27    / Base Excess: 2.1   /  SaO2: 98                  Blood Gas Arterial - Lytes,H+H,iCa,Lact: Performed In Lab (08-13-20 @ 13:10)        ===================== RENAL =========================  -Creatinine at baseline; electrolytes all within normal limits; will monitor intake and output     08-13-20 @ 07:01  -  08-13-20 @ 17:27  --------------------------------------------------------  IN: 285 mL / OUT: 350 mL / NET: -65 mL      ==================== GASTROINTESTINAL===================  -The patient's last bowel movement was yesterday prior to admission  -Patient is on a dash diet  -No active concerns     Indication for Stress Ulcer Prophylaxis, [ ] Yes    [x] No   If Yes, Medication:     pantoprazole    Tablet 40 milliGRAM(s) Oral before breakfast  potassium chloride    Tablet ER 20 milliEquivalent(s) Oral every 2 hours    ========================INFECTIOUS DISEASE================  -white blood cell count not elevated; no fevers; no concerns at this time     T(C): 36.3 (08-13-20 @ 14:00), Max: 36.7 (08-13-20 @ 08:43)  WBC Count: 5.86 K/uL (08-13-20 @ 13:19)  WBC Count: 5.55 K/uL (08-11-20 @ 10:42)    ===================HEMATOLOGIC/ONC ===================  -Currently on heparin for dvt prophylaxis   -Continue with dual antiplatelet therapy in the setting of tavr today     Hemoglobin: 12.2 g/dL (08-13-20 @ 13:19)  Hemoglobin: 13.4 g/dL (08-11-20 @ 10:42)    Platelet Count - Automated: 104 K/uL (08-13-20 @ 13:19)  Platelet Count - Automated: 127 K/uL (08-11-20 @ 10:42)    Chemical VTE Prophylaxis:  [ ] Lovenox    [x] SQH   [ ]NA  Systemic Anticogaulation:  [ ] Yes    [x] No,  If Yes, Medication and Indication:     aspirin enteric coated 81 milliGRAM(s) Oral daily  clopidogrel Tablet 75 milliGRAM(s) Oral every 24 hours  heparin   Injectable 5000 Unit(s) SubCutaneous every 8 hours    =======================    ENDOCRINE  =====================  -Hgb A1c 5.4; no acute concerns at this time     POCT Blood Glucose.: 108 mg/dL (08-13-20 @ 08:20)        atorvastatin 10 milliGRAM(s) Oral at bedtime    ======================= LINES/TUBES  =====================  Arterial Line: placed 8/13     Disposition: Will plan for transition to telemetry floor following transition to oral anti-hypertensive medications and removal of trans-venous pacer

## 2020-08-13 NOTE — PROGRESS NOTE ADULT - SUBJECTIVE AND OBJECTIVE BOX
This patient has been implanted with a Transcatheter Aortic Valve Implant under the following NCT/DEDRICK:    TAVR:    Commercial Implant NCT [id number], DEDRICK N/A and will be placed into the TVT Registry.      HUDSON Calero  [id number]

## 2020-08-13 NOTE — PROGRESS NOTE ADULT - SUBJECTIVE AND OBJECTIVE BOX
====================  CCU MIDNIGHT ROUNDS  ====================    ALESSANDRO CONTRERAS  [id number]  Patient is a 79y old  Female who presents with a chief complaint of bradycardia following tavr (13 Aug 2020 15:50)      ====================  SUMMARY:  78 y/o F with a PMHx of severe-symptomatic aortic stenosis, HTN, and HLD who presented on 8/13 for trans-catheter aortic valve replacement. Patient has a known history of ventricular conduction delay with a documented history of right bundle branch block and left anterior fascicular block (left anterior fascicular fascicular block noted on EKG in June 2020). During TAVR patient had left bundle branch block after valve deployment which then transitioned to junctional (narrow) escape, had a transvenous pacer placed, and was transferred to the CCU for further management.   Of note, patient had a coronary artery catheterization in June 2020, which demonstrated no obstructive coronary disease.  ====================      ====================  NEW EVENTS:  - TVP removed today  ====================      ====================  VITALS (Last 12 hrs):  ====================    T(C): 36.5 (08-13-20 @ 19:00), Max: 36.5 (08-13-20 @ 19:00)  T(F): 97.7 (08-13-20 @ 19:00), Max: 97.7 (08-13-20 @ 19:00)  HR: 74 (08-13-20 @ 22:00) (74 - 90)  BP: 152/59 (08-13-20 @ 13:00) (141/64 - 152/59)  BP(mean): 88 (08-13-20 @ 13:00) (84 - 91)  ABP: 102/48 (08-13-20 @ 22:00) (102/48 - 164/70)  ABP(mean): 68 (08-13-20 @ 22:00) (68 - 108)  RR: 22 (08-13-20 @ 22:00) (17 - 31)  SpO2: 92% (08-13-20 @ 22:00) (92% - 99%)      I&O's Summary    13 Aug 2020 07:01  -  13 Aug 2020 22:50  --------------------------------------------------------  IN: 625 mL / OUT: 1050 mL / NET: -425 mL            ====================  NEW LABS:  ====================             12.2   5.86  )-----------( 104      ( 13 Aug 2020 13:19 )             37.1     08-13    142  |  106  |  20  ----------------------------<  131<H>  3.6   |  25  |  1.05    Ca    9.2      13 Aug 2020 13:19  Phos  3.1     08-13  Mg     1.9     08-13    TPro  6.2  /  Alb  3.7  /  TBili  0.9  /  DBili  x   /  AST  20  /  ALT  14  /  AlkPhos  76  08-13    PT/INR - ( 13 Aug 2020 13:19 )   PT: 12.8 sec;   INR: 1.08 ratio    PTT - ( 13 Aug 2020 13:19 )  PTT:33.9 sec    ABG - ( 13 Aug 2020 13:10 )  pH, Arterial: 7.40  pH, Blood: x     /  pCO2: 45    /  pO2: 117   / HCO3: 27    / Base Excess: 2.1   /  SaO2: 98              ====================  PLAN:  ====================  #CV  transient LBBB w/ junctional escape  - LBBB resolved, no bradycardia or AV block on telemetry s/p TVP removal  - no PPM required per EP, appreciate recs  - cont to monitor telemetry, NSR rate 70-80s  - daily EKG  - monitor lytes keep K>4, Mg>2    s/p TAVR  - cont asa, plavix, statin    HTN  - briefly required cardene gtt, BP now 110-130s/60s off cardene infusion  - cont home dose losartan 100/HCTZ 12.5    LINES: L radial adalberto (8/13-), placed intra-op    Yesi Nam PA-C  CCU/Cardiology  [id number] ====================  CCU MIDNIGHT ROUNDS  ====================    ALESSANDRO CONTRERAS  [id number]  Patient is a 79y old  Female who presents with a chief complaint of bradycardia following tavr (13 Aug 2020 15:50)      ====================  SUMMARY:  78 y/o F with a PMHx of severe-symptomatic aortic stenosis, HTN, and HLD who presented on 8/13 for trans-catheter aortic valve replacement. Patient has a known history of ventricular conduction delay with a documented history of right bundle branch block and left anterior fascicular block (left anterior fascicular fascicular block noted on EKG in June 2020). During TAVR patient had left bundle branch block after valve deployment which then transitioned to junctional (narrow) escape, had a transvenous pacer placed, and was transferred to the CCU for further management.   Of note, patient had a coronary artery catheterization in June 2020, which demonstrated no obstructive coronary disease.  ====================      ====================  NEW EVENTS:  - TVP removed today  ====================      ====================  VITALS (Last 12 hrs):  ====================    T(C): 36.5 (08-13-20 @ 19:00), Max: 36.5 (08-13-20 @ 19:00)  T(F): 97.7 (08-13-20 @ 19:00), Max: 97.7 (08-13-20 @ 19:00)  HR: 74 (08-13-20 @ 22:00) (74 - 90)  BP: 152/59 (08-13-20 @ 13:00) (141/64 - 152/59)  BP(mean): 88 (08-13-20 @ 13:00) (84 - 91)  ABP: 102/48 (08-13-20 @ 22:00) (102/48 - 164/70)  ABP(mean): 68 (08-13-20 @ 22:00) (68 - 108)  RR: 22 (08-13-20 @ 22:00) (17 - 31)  SpO2: 92% (08-13-20 @ 22:00) (92% - 99%)      I&O's Summary    13 Aug 2020 07:01  -  13 Aug 2020 22:50  --------------------------------------------------------  IN: 625 mL / OUT: 1050 mL / NET: -425 mL            ====================  NEW LABS:  ====================             12.2   5.86  )-----------( 104      ( 13 Aug 2020 13:19 )             37.1     08-13    142  |  106  |  20  ----------------------------<  131<H>  3.6   |  25  |  1.05    Ca    9.2      13 Aug 2020 13:19  Phos  3.1     08-13  Mg     1.9     08-13    TPro  6.2  /  Alb  3.7  /  TBili  0.9  /  DBili  x   /  AST  20  /  ALT  14  /  AlkPhos  76  08-13    PT/INR - ( 13 Aug 2020 13:19 )   PT: 12.8 sec;   INR: 1.08 ratio    PTT - ( 13 Aug 2020 13:19 )  PTT:33.9 sec    ABG - ( 13 Aug 2020 13:10 )  pH, Arterial: 7.40  pH, Blood: x     /  pCO2: 45    /  pO2: 117   / HCO3: 27    / Base Excess: 2.1   /  SaO2: 98              ====================  PLAN:  ====================  #CV  transient LBBB w/ junctional escape  - LBBB resolved, no bradycardia or AV block on telemetry s/p TVP removal  - no PPM required per EP, appreciate recs  - cont to monitor telemetry, NSR rate 70-80s  - daily EKG  - monitor lytes keep K>4, Mg>2    s/p TAVR  - cont asa, plavix, statin  - intra-operative DORCAS: Mild AR, no LA or LA appendage thrombus    HTN  - briefly required cardene gtt, BP now 110-130s/60s off cardene infusion  - cont home dose losartan 100/HCTZ 12.5    LINES: L radial adalberto (8/13-), placed intra-op    Yesi Nam PA-C  CCU/Cardiology  [id number] ====================  CCU MIDNIGHT ROUNDS  ====================    ALESSANDRO CONTRERAS  [id number]  Patient is a 79y old  Female who presents with a chief complaint of bradycardia following tavr (13 Aug 2020 15:50)      ====================  SUMMARY:  78 y/o F with a PMHx of severe-symptomatic aortic stenosis, HTN, and HLD who presented on 8/13 for trans-catheter aortic valve replacement. Patient has a known history of ventricular conduction delay with a documented history of right bundle branch block and left anterior fascicular block (left anterior fascicular fascicular block noted on EKG in June 2020). During TAVR patient had left bundle branch block after valve deployment which then transitioned to junctional (narrow) escape, had a transvenous pacer placed, and was transferred to the CCU for further management.   Of note, patient had a coronary artery catheterization in June 2020, which demonstrated no obstructive coronary disease.  ====================      ====================  NEW EVENTS:  - TVP removed today  ====================      ====================  VITALS (Last 12 hrs):  ====================    T(C): 36.5 (08-13-20 @ 19:00), Max: 36.5 (08-13-20 @ 19:00)  T(F): 97.7 (08-13-20 @ 19:00), Max: 97.7 (08-13-20 @ 19:00)  HR: 74 (08-13-20 @ 22:00) (74 - 90)  BP: 152/59 (08-13-20 @ 13:00) (141/64 - 152/59)  BP(mean): 88 (08-13-20 @ 13:00) (84 - 91)  ABP: 102/48 (08-13-20 @ 22:00) (102/48 - 164/70)  ABP(mean): 68 (08-13-20 @ 22:00) (68 - 108)  RR: 22 (08-13-20 @ 22:00) (17 - 31)  SpO2: 92% (08-13-20 @ 22:00) (92% - 99%)      I&O's Summary    13 Aug 2020 07:01  -  13 Aug 2020 22:50  --------------------------------------------------------  IN: 625 mL / OUT: 1050 mL / NET: -425 mL            ====================  NEW LABS:  ====================             12.2   5.86  )-----------( 104      ( 13 Aug 2020 13:19 )             37.1     08-13    142  |  106  |  20  ----------------------------<  131<H>  3.6   |  25  |  1.05    Ca    9.2      13 Aug 2020 13:19  Phos  3.1     08-13  Mg     1.9     08-13    TPro  6.2  /  Alb  3.7  /  TBili  0.9  /  DBili  x   /  AST  20  /  ALT  14  /  AlkPhos  76  08-13    PT/INR - ( 13 Aug 2020 13:19 )   PT: 12.8 sec;   INR: 1.08 ratio    PTT - ( 13 Aug 2020 13:19 )  PTT:33.9 sec    ABG - ( 13 Aug 2020 13:10 )  pH, Arterial: 7.40  pH, Blood: x     /  pCO2: 45    /  pO2: 117   / HCO3: 27    / Base Excess: 2.1   /  SaO2: 98              ====================  PLAN:  ====================  #CV  transient LBBB w/ junctional escape  - LBBB resolved, no bradycardia or AV block on telemetry s/p TVP removal  - no PPM required per EP, appreciate recs  - cont to monitor telemetry, NSR rate 70-80s  - daily EKG  - monitor lytes keep K>4, Mg>2    s/p TAVR  - cont asa, plavix  - intra-operative DORCAS: Mild AR, no LA or LA appendage thrombus    HTN  - briefly required cardene gtt, BP now 110-130s/60s off cardene infusion  - cont home dose losartan 100/HCTZ 12.5    LINES: L radial adalberto (8/13-), placed intra-op    Yesi Nam PA-C  CCU/Cardiology  [id number]

## 2020-08-13 NOTE — H&P ADULT - NSHPLABSRESULTS_GEN_ALL_CORE
12.2   5.86  )-----------( 104      ( 13 Aug 2020 13:19 )             37.1     Auto Eosinophil # 0.09  / Auto Eosinophil % 1.5   / Auto Neutrophil # 4.27  / Auto Neutrophil % 72.9  / BANDS % x        08-13    142  |  106  |  20  ----------------------------<  131<H>  3.6   |  25  |  1.05    Ca    9.2      13 Aug 2020 13:19  Mg     1.9     08-13  Phos  3.1     08-13  TPro  6.2  /  Alb  3.7  /  TBili  0.9  /  DBili  x   /  AST  20  /  ALT  14  /  AlkPhos  76  08-13    PT/INR - ( 13 Aug 2020 13:19 )   PT: 12.8 sec;   INR: 1.08 ratio         PTT - ( 13 Aug 2020 13:19 )  PTT:33.9 sec      ABG: ( 13 Aug 2020 13:10 ) pH: 7.40  /  pCO2: 45    /  pO2: 117   / HCO3: 27    / Base Excess: 2.1   /  SaO2: 98

## 2020-08-13 NOTE — H&P ADULT - NSHPREVIEWOFSYSTEMS_GEN_ALL_CORE
CONSTITUTIONAL: No weakness, fevers, or chills  NECK: No pain, no stiffness  RESPIRATORY: No cough, no shortness of breath  CARDIOVASCULAR: No chest pain, no palpitations  GASTROINTESTINAL: No abdominal pain, no epigastric pain. No nausea, vomiting, or hematemesis; No diarrhea or constipation. No melena or hematochezia.  GENITOURINARY: No dysuria, frequency or hematuria  NEUROLOGICAL: No numbness, no weakness  EXTREMITIES: No pain in either groin, no swelling

## 2020-08-13 NOTE — H&P ADULT - ATTENDING COMMENTS
I have personally seen, examined and participated in the care of this patient. I have reviewed all pertinent clinical information, including history, physical exam, plan and the resident's note. I agree with the resident's note with the following additions:    Pre-procedure left anterior fasicular block now s/p TAVR with Core valve   Had left bundle branch block after valve deployment which then transitioned to junctional (narrow) escape  TVP placed, now on back-up - can remove  Cardene infusion for malignant hypertension - will restart outpatient anti-hypertensive regimen and wean Cardene  ASA, Plavix, statin  O2 sats mid to high 90s on nasal cannula  DASH diet  Normal renal function  H/H normal  Afebrile, no antibiotics  Sugars controlled    The patient required critical care management and I personally provided 30 minutes of non-continuous care to the patient, excluding separate procedures, in addition to discussing the patient and plan at length with the ICU staff and helping coordinate care.

## 2020-08-13 NOTE — H&P ADULT - NSICDXPASTSURGICALHX_GEN_ALL_CORE_FT
PAST SURGICAL HISTORY:  H/O eye surgery PRK bilateral eyes    S/P breast lumpectomy 2002, right, for breast CA, followed by RT and Arimidex for 5 years    S/P cataract surgery bilateral lens implants

## 2020-08-13 NOTE — H&P ADULT - NSHPPHYSICALEXAM_GEN_ALL_CORE
GENERAL: Awake and alert, no apparent distress   HEAD:  Atraumatic, Normocephalic  EYES: conjunctiva and sclera clear, moist mucous membranes   NECK: No JVD, no lymphadenopathy   CHEST/LUNG: Clear to auscultation bilaterally; no rales   HEART: Regular rate and rhythm; No murmurs, rubs, or gallops  ABDOMEN: Soft, Nontender, Nondistended; Bowel sounds present  EXTREMITIES: TVP in place in right groin; dressings noted in bilateral groins; no swelling, no palpable thrill, no bruit; 2+ dp Pulses, No clubbing, cyanosis, or edema  NEUROLOGY: No gross motor or sensory deficits

## 2020-08-14 LAB
ALBUMIN SERPL ELPH-MCNC: 3.6 G/DL — SIGNIFICANT CHANGE UP (ref 3.3–5)
ALP SERPL-CCNC: 66 U/L — SIGNIFICANT CHANGE UP (ref 40–120)
ALT FLD-CCNC: 14 U/L — SIGNIFICANT CHANGE UP (ref 10–45)
ANION GAP SERPL CALC-SCNC: 9 MMOL/L — SIGNIFICANT CHANGE UP (ref 5–17)
AST SERPL-CCNC: 25 U/L — SIGNIFICANT CHANGE UP (ref 10–40)
BILIRUB SERPL-MCNC: 0.5 MG/DL — SIGNIFICANT CHANGE UP (ref 0.2–1.2)
BUN SERPL-MCNC: 21 MG/DL — SIGNIFICANT CHANGE UP (ref 7–23)
CALCIUM SERPL-MCNC: 8.9 MG/DL — SIGNIFICANT CHANGE UP (ref 8.4–10.5)
CHLORIDE SERPL-SCNC: 106 MMOL/L — SIGNIFICANT CHANGE UP (ref 96–108)
CO2 SERPL-SCNC: 27 MMOL/L — SIGNIFICANT CHANGE UP (ref 22–31)
CREAT SERPL-MCNC: 1.28 MG/DL — SIGNIFICANT CHANGE UP (ref 0.5–1.3)
GLUCOSE SERPL-MCNC: 110 MG/DL — HIGH (ref 70–99)
HCT VFR BLD CALC: 34.2 % — LOW (ref 34.5–45)
HGB BLD-MCNC: 11.3 G/DL — LOW (ref 11.5–15.5)
MAGNESIUM SERPL-MCNC: 2.4 MG/DL — SIGNIFICANT CHANGE UP (ref 1.6–2.6)
MCHC RBC-ENTMCNC: 30.1 PG — SIGNIFICANT CHANGE UP (ref 27–34)
MCHC RBC-ENTMCNC: 33 GM/DL — SIGNIFICANT CHANGE UP (ref 32–36)
MCV RBC AUTO: 91.2 FL — SIGNIFICANT CHANGE UP (ref 80–100)
NRBC # BLD: 0 /100 WBCS — SIGNIFICANT CHANGE UP (ref 0–0)
PHOSPHATE SERPL-MCNC: 4 MG/DL — SIGNIFICANT CHANGE UP (ref 2.5–4.5)
PLATELET # BLD AUTO: 103 K/UL — LOW (ref 150–400)
POTASSIUM SERPL-MCNC: 4.2 MMOL/L — SIGNIFICANT CHANGE UP (ref 3.5–5.3)
POTASSIUM SERPL-SCNC: 4.2 MMOL/L — SIGNIFICANT CHANGE UP (ref 3.5–5.3)
PROT SERPL-MCNC: 5.8 G/DL — LOW (ref 6–8.3)
RBC # BLD: 3.75 M/UL — LOW (ref 3.8–5.2)
RBC # FLD: 12.7 % — SIGNIFICANT CHANGE UP (ref 10.3–14.5)
SARS-COV-2 IGG SERPL QL IA: NEGATIVE — SIGNIFICANT CHANGE UP
SARS-COV-2 IGM SERPL IA-ACNC: <3.8 AU/ML — SIGNIFICANT CHANGE UP
SODIUM SERPL-SCNC: 142 MMOL/L — SIGNIFICANT CHANGE UP (ref 135–145)
WBC # BLD: 6.25 K/UL — SIGNIFICANT CHANGE UP (ref 3.8–10.5)
WBC # FLD AUTO: 6.25 K/UL — SIGNIFICANT CHANGE UP (ref 3.8–10.5)

## 2020-08-14 PROCEDURE — 99233 SBSQ HOSP IP/OBS HIGH 50: CPT | Mod: GC

## 2020-08-14 PROCEDURE — 99232 SBSQ HOSP IP/OBS MODERATE 35: CPT

## 2020-08-14 PROCEDURE — 93306 TTE W/DOPPLER COMPLETE: CPT | Mod: 26

## 2020-08-14 PROCEDURE — 93010 ELECTROCARDIOGRAM REPORT: CPT

## 2020-08-14 RX ORDER — ACETAMINOPHEN 500 MG
650 TABLET ORAL EVERY 6 HOURS
Refills: 0 | Status: DISCONTINUED | OUTPATIENT
Start: 2020-08-14 | End: 2020-08-16

## 2020-08-14 RX ADMIN — HEPARIN SODIUM 5000 UNIT(S): 5000 INJECTION INTRAVENOUS; SUBCUTANEOUS at 05:06

## 2020-08-14 RX ADMIN — CHLORHEXIDINE GLUCONATE 1 APPLICATION(S): 213 SOLUTION TOPICAL at 05:07

## 2020-08-14 RX ADMIN — Medication 12.5 MILLIGRAM(S): at 05:06

## 2020-08-14 RX ADMIN — LOSARTAN POTASSIUM 100 MILLIGRAM(S): 100 TABLET, FILM COATED ORAL at 05:07

## 2020-08-14 RX ADMIN — ATORVASTATIN CALCIUM 10 MILLIGRAM(S): 80 TABLET, FILM COATED ORAL at 21:05

## 2020-08-14 RX ADMIN — CLOPIDOGREL BISULFATE 75 MILLIGRAM(S): 75 TABLET, FILM COATED ORAL at 12:39

## 2020-08-14 RX ADMIN — Medication 81 MILLIGRAM(S): at 12:39

## 2020-08-14 RX ADMIN — PANTOPRAZOLE SODIUM 40 MILLIGRAM(S): 20 TABLET, DELAYED RELEASE ORAL at 05:07

## 2020-08-14 RX ADMIN — HEPARIN SODIUM 5000 UNIT(S): 5000 INJECTION INTRAVENOUS; SUBCUTANEOUS at 21:05

## 2020-08-14 RX ADMIN — HEPARIN SODIUM 5000 UNIT(S): 5000 INJECTION INTRAVENOUS; SUBCUTANEOUS at 12:39

## 2020-08-14 NOTE — PROGRESS NOTE ADULT - SUBJECTIVE AND OBJECTIVE BOX
24H hour events: Patient without complaints. Tele unremarkable    MEDICATIONS:  aspirin enteric coated 81 milliGRAM(s) Oral daily  clopidogrel Tablet 75 milliGRAM(s) Oral every 24 hours  heparin   Injectable 5000 Unit(s) SubCutaneous every 8 hours  hydrochlorothiazide 12.5 milliGRAM(s) Oral daily  losartan 100 milliGRAM(s) Oral daily  pantoprazole    Tablet 40 milliGRAM(s) Oral before breakfast  atorvastatin 10 milliGRAM(s) Oral at bedtime  chlorhexidine 4% Liquid 1 Application(s) Topical <User Schedule>      REVIEW OF SYSTEMS:  See HPI, otherwise ROS negative.    PHYSICAL EXAM:  T(C): 36.7 (08-14-20 @ 07:30), Max: 36.7 (08-14-20 @ 07:30)  HR: 76 (08-14-20 @ 09:15) (66 - 90)  BP: 147/53 (08-14-20 @ 09:15) (120/60 - 170/83)  RR: 20 (08-14-20 @ 09:15) (12 - 31)  SpO2: 94% (08-14-20 @ 09:15) (92% - 99%)  Wt(kg): --  I&O's Summary    13 Aug 2020 07:01  -  14 Aug 2020 07:00  --------------------------------------------------------  IN: 745 mL / OUT: 1050 mL / NET: -305 mL        Appearance: Alert. NAD	  Cardiovascular: +S1S2 RRR no m/g/r  Respiratory: CTA B/L	  Psychiatry: A & O x 3, Mood & affect appropriate  Neurologic: Non-focal  Extremities: No edema BLE  Vascular: Peripheral pulses palpable 2+ bilaterally      LABS:	 	    CBC Full  -  ( 14 Aug 2020 02:09 )  WBC Count : 6.25 K/uL  Hemoglobin : 11.3 g/dL  Hematocrit : 34.2 %  Platelet Count - Automated : 103 K/uL  Mean Cell Volume : 91.2 fl  Mean Cell Hemoglobin : 30.1 pg  Mean Cell Hemoglobin Concentration : 33.0 gm/dL    08-14    142  |  106  |  21  ----------------------------<  110<H>  4.2   |  27  |  1.28  08-13    142  |  106  |  20  ----------------------------<  131<H>  3.6   |  25  |  1.05    Ca    8.9      14 Aug 2020 02:09  Ca    9.2      13 Aug 2020 13:19  Phos  4.0     08-14  Phos  3.1     08-13  Mg     2.4     08-14  Mg     1.9     08-13    TPro  5.8<L>  /  Alb  3.6  /  TBili  0.5  /  DBili  x   /  AST  25  /  ALT  14  /  AlkPhos  66  08-14  TPro  6.2  /  Alb  3.7  /  TBili  0.9  /  DBili  x   /  AST  20  /  ALT  14  /  AlkPhos  76  08-13    TELEMETRY: SR w/ HR's between 60-80's bpm; narrow QRS; no significant akira or pauses  	    ECG:  SR w/ narrow QRS	    	  ASSESSMENT/PLAN: 	  80y/o female h/o HTN, HLD, NL LVEF, AS, LAHB s/p TAVR 8/13 c/b transient LBBB which has since resolved.   1. AS s/p TAVR 8/13 c/b transient LBBB which resolved several hours post TAVR  2. LAHB pre TAVR  3. NL LVEF  4. HTN    --Telemetry shows SR w/ narrow QRS and no significant bradycardia. At this time no indication for PPM implant  --Daily EKG's    Gianna Carbajal PA-C  [id number]

## 2020-08-14 NOTE — PROGRESS NOTE ADULT - SUBJECTIVE AND OBJECTIVE BOX
Structural Heart Team    Ms Blackman is seen and examined sitting up in a chair.  She says that she feels "good" and denies cp, sob, dizziness, pain.  She had no acute events overnight and the transient LBBB narrowed.        REVIEW OF SYSTEMS:    CONSTITUTIONAL: No weakness, fevers or chills  EYES/ENT: No visual changes;  No vertigo or throat pain   NECK: No pain or stiffness  RESPIRATORY: No cough, wheezing, hemoptysis; No shortness of breath  CARDIOVASCULAR: No chest pain or palpitations  GASTROINTESTINAL: No abdominal or epigastric pain. No nausea, vomiting, or hematemesis; No diarrhea or constipation. No melena or hematochezia.  GENITOURINARY: No dysuria, frequency or hematuria  NEUROLOGICAL: No numbness or weakness  SKIN: No itching, rashes      Allergies    No Known Allergies    Intolerances      Vital Signs Last 24 Hrs  T(C): 36.8 (14 Aug 2020 11:00), Max: 36.8 (14 Aug 2020 11:00)  T(F): 98.3 (14 Aug 2020 11:00), Max: 98.3 (14 Aug 2020 11:00)  HR: 70 (14 Aug 2020 11:00) (66 - 90)  BP: 109/51 (14 Aug 2020 11:00) (109/51 - 170/83)  BP(mean): 70 (14 Aug 2020 11:00) (70 - 117)  RR: 14 (14 Aug 2020 11:00) (12 - 31)  SpO2: 95% (14 Aug 2020 11:00) (92% - 99%)    MEDICATIONS  (STANDING):  aspirin enteric coated 81 milliGRAM(s) Oral daily  atorvastatin 10 milliGRAM(s) Oral at bedtime  chlorhexidine 4% Liquid 1 Application(s) Topical <User Schedule>  clopidogrel Tablet 75 milliGRAM(s) Oral every 24 hours  heparin   Injectable 5000 Unit(s) SubCutaneous every 8 hours  hydrochlorothiazide 12.5 milliGRAM(s) Oral daily  losartan 100 milliGRAM(s) Oral daily  pantoprazole    Tablet 40 milliGRAM(s) Oral before breakfast      Exam-  General: NAD, obese  Cor: s1s2, no murmurs    EKG: SR, narrowed QRS  Pulm: CTA b/l, no w/r/r  Gastointestinal: soft, nontender, nondistended, +bowel sounds  Extremities: no edema, 1+ DP pulses b/l  Groin: dressings in place, clean and dry, soft w/ no hematoma, nontender  Neuro: A&Ox3, nonfocal                          11.3   6.25  )-----------( 103      ( 14 Aug 2020 02:09 )             34.2   08-14    142  |  106  |  21  ----------------------------<  110<H>  4.2   |  27  |  1.28    Ca    8.9      14 Aug 2020 02:09  Phos  4.0     08-14  Mg     2.4     08-14    TPro  5.8<L>  /  Alb  3.6  /  TBili  0.5  /  DBili  x   /  AST  25  /  ALT  14  /  AlkPhos  66  08-14  PT/INR - ( 13 Aug 2020 13:19 )   PT: 12.8 sec;   INR: 1.08 ratio         PTT - ( 13 Aug 2020 13:19 )  PTT:33.9 sec  I&O's Summary    13 Aug 2020 07:01  -  14 Aug 2020 07:00  --------------------------------------------------------  IN: 745 mL / OUT: 1050 mL / NET: -305 mL    14 Aug 2020 07:01  -  14 Aug 2020 12:06  --------------------------------------------------------  IN: 200 mL / OUT: 0 mL / NET: 200 mL              Assessment/Plan:  Ms. Blackman is POD 1 s/p TF TAVR (#29 CV) for severe symptomatic AS    - awaiting post TAVR TTE  - cont asa/ plavix  - d/c with [facility] for continued monitoring due to history of bifascicular block and transient LBBB after valve implantation   - Discharge plan: follow up with Dr. Mccall in one week and follow up with Structural Heart Team in one month.  Echo will be done at 1 month follow up visit.  Plan discussed with patient     Reggie Hamilton, PA  [phone] Structural Heart Team    Ms Blackman is seen and examined sitting up in a chair.  She says that she feels "good" and denies cp, sob, dizziness, pain.  She had no acute events overnight and the transient LBBB narrowed.        REVIEW OF SYSTEMS:    CONSTITUTIONAL: No weakness, fevers or chills  EYES/ENT: No visual changes;  No vertigo or throat pain   NECK: No pain or stiffness  RESPIRATORY: No cough, wheezing, hemoptysis; No shortness of breath  CARDIOVASCULAR: No chest pain or palpitations  GASTROINTESTINAL: No abdominal or epigastric pain. No nausea, vomiting, or hematemesis; No diarrhea or constipation. No melena or hematochezia.  GENITOURINARY: No dysuria, frequency or hematuria  NEUROLOGICAL: No numbness or weakness  SKIN: No itching, rashes      Allergies    No Known Allergies    Intolerances      Vital Signs Last 24 Hrs  T(C): 36.8 (14 Aug 2020 11:00), Max: 36.8 (14 Aug 2020 11:00)  T(F): 98.3 (14 Aug 2020 11:00), Max: 98.3 (14 Aug 2020 11:00)  HR: 70 (14 Aug 2020 11:00) (66 - 90)  BP: 109/51 (14 Aug 2020 11:00) (109/51 - 170/83)  BP(mean): 70 (14 Aug 2020 11:00) (70 - 117)  RR: 14 (14 Aug 2020 11:00) (12 - 31)  SpO2: 95% (14 Aug 2020 11:00) (92% - 99%)    MEDICATIONS  (STANDING):  aspirin enteric coated 81 milliGRAM(s) Oral daily  atorvastatin 10 milliGRAM(s) Oral at bedtime  chlorhexidine 4% Liquid 1 Application(s) Topical <User Schedule>  clopidogrel Tablet 75 milliGRAM(s) Oral every 24 hours  heparin   Injectable 5000 Unit(s) SubCutaneous every 8 hours  hydrochlorothiazide 12.5 milliGRAM(s) Oral daily  losartan 100 milliGRAM(s) Oral daily  pantoprazole    Tablet 40 milliGRAM(s) Oral before breakfast      Exam-  General: NAD, obese  Cor: s1s2, no murmurs    EKG: SR, narrowed QRS  Pulm: CTA b/l, no w/r/r  Gastointestinal: soft, nontender, nondistended, +bowel sounds  Extremities: no edema, 1+ DP pulses b/l  Groin: dressings in place, clean and dry, soft w/ no hematoma, nontender  Neuro: A&Ox3, nonfocal                          11.3   6.25  )-----------( 103      ( 14 Aug 2020 02:09 )             34.2   08-14    142  |  106  |  21  ----------------------------<  110<H>  4.2   |  27  |  1.28    Ca    8.9      14 Aug 2020 02:09  Phos  4.0     08-14  Mg     2.4     08-14    TPro  5.8<L>  /  Alb  3.6  /  TBili  0.5  /  DBili  x   /  AST  25  /  ALT  14  /  AlkPhos  66  08-14  PT/INR - ( 13 Aug 2020 13:19 )   PT: 12.8 sec;   INR: 1.08 ratio         PTT - ( 13 Aug 2020 13:19 )  PTT:33.9 sec  I&O's Summary    13 Aug 2020 07:01  -  14 Aug 2020 07:00  --------------------------------------------------------  IN: 745 mL / OUT: 1050 mL / NET: -305 mL    14 Aug 2020 07:01  -  14 Aug 2020 12:06  --------------------------------------------------------  IN: 200 mL / OUT: 0 mL / NET: 200 mL              Assessment/Plan:  Ms. Blackman is POD 1 s/p TF TAVR (#29 CV) for severe symptomatic AS    - awaiting post TAVR TTE  - transfer to [facility] pending bed availability   - ambulate on [facility]  - cont asa/ plavix  - d/c with [facility] for continued monitoring due to history of bifascicular block and transient LBBB after valve implantation  - likely d/c home tomorrow   - Discharge plan: follow up with Dr. Mccall in one week and follow up with Structural Heart Team in one month.  Echo will be done at 1 month follow up visit.  Plan discussed with patient     HUDSON Hickman  [phone]

## 2020-08-14 NOTE — CHART NOTE - NSCHARTNOTEFT_GEN_A_CORE
CCU Transfer Note    Transfer from: CCU  Transfer to:  (  ) Medicine    (x) Telemetry    (  ) RCU    (  ) Palliative    (  ) Stroke Unit    (  ) _______________  Accepting physican:      CCU COURSE: The patient is an 79-year-old woman with a past medical history of hypertension, hyperlipidemia, and severe-symptomatic aortic stenosis who presented to the hospital on 8/13 for an elective trans-catheter aortic valve replacement. At baseline, the patient has documented conductive system disease--a documented history of right bundle branch block and of left anterior fascicular block. Following the patient's procedure, she developed a wide-complex bradycardia (presumed junctional) and associated hypotension, so a transvenous pacemaker was placed, and she was transferred to the CCU for further management. The patient was also administered a nicardipine drip for blood pressure control. In the CCU, the patient had a sinus rhythm at a regular rate, and her qrs complex was narrow. She did not require any pacing via her transvenous pacer, so it was removed. The patient was hemodynamically stable, and her nicardipine drip was stopped, and she was transitioned to her home oral blood pressure medications. The patient is hemodynamically stable, and she ambulated today. At this time, she is ready for transition to a telemetry floor.       ASSESSMENT & PLAN: 1. Junctional bradycardia: -The patient was noted to have a wide-complex (presumed junctional) bradycardia following tavr; most likely occurred in the setting of valve placement and transient blockade of the AV node   -Subsequent to transfer to the CCU, the patient has had a regular, narrow complex, sinus rhythm; her tvp has been removed   -      For Follow-Up:          Vital Signs Last 24 Hrs  T(C): 36.8 (14 Aug 2020 11:00), Max: 36.8 (14 Aug 2020 11:00)  T(F): 98.3 (14 Aug 2020 11:00), Max: 98.3 (14 Aug 2020 11:00)  HR: 70 (14 Aug 2020 14:00) (66 - 84)  BP: 126/62 (14 Aug 2020 14:00) (98/56 - 170/83)  BP(mean): 95 (14 Aug 2020 14:00) (70 - 117)  RR: 20 (14 Aug 2020 14:00) (12 - 31)  SpO2: 97% (14 Aug 2020 14:00) (92% - 99%)  I&O's Summary    13 Aug 2020 07:01  -  14 Aug 2020 07:00  --------------------------------------------------------  IN: 745 mL / OUT: 1050 mL / NET: -305 mL    14 Aug 2020 07:01  -  14 Aug 2020 14:43  --------------------------------------------------------  IN: 200 mL / OUT: 1000 mL / NET: -800 mL          MEDICATIONS  (STANDING):  aspirin enteric coated 81 milliGRAM(s) Oral daily  atorvastatin 10 milliGRAM(s) Oral at bedtime  chlorhexidine 4% Liquid 1 Application(s) Topical <User Schedule>  clopidogrel Tablet 75 milliGRAM(s) Oral every 24 hours  heparin   Injectable 5000 Unit(s) SubCutaneous every 8 hours  hydrochlorothiazide 12.5 milliGRAM(s) Oral daily  losartan 100 milliGRAM(s) Oral daily  pantoprazole    Tablet 40 milliGRAM(s) Oral before breakfast    MEDICATIONS  (PRN):        LABS                                            11.3                  Neurophils% (auto):   x      (08-14 @ 02:09):    6.25 )-----------(103          Lymphocytes% (auto):  x                                             34.2                   Eosinphils% (auto):   x        Manual%: Neutrophils x    ; Lymphocytes x    ; Eosinophils x    ; Bands%: x    ; Blasts x                                    142    |  106    |  21                  Calcium: 8.9   / iCa: x      (08-14 @ 02:09)    ----------------------------<  110       Magnesium: 2.4                              4.2     |  27     |  1.28             Phosphorous: 4.0      TPro  5.8    /  Alb  3.6    /  TBili  0.5    /  DBili  x      /  AST  25     /  ALT  14     /  AlkPhos  66     14 Aug 2020 02:09 CCU Transfer Note    Transfer from: CCU  Transfer to:  (  ) Medicine    (x) Telemetry    (  ) RCU    (  ) Palliative    (  ) Stroke Unit    (  ) _______________  Accepting physican:      CCU COURSE: The patient is an 79-year-old woman with a past medical history of hypertension, hyperlipidemia, and severe-symptomatic aortic stenosis who presented to the hospital on 8/13 for an elective trans-catheter aortic valve replacement. At baseline, the patient has documented conductive system disease--a documented history of right bundle branch block and of left anterior fascicular block. Following the patient's procedure, she developed a wide-complex bradycardia (presumed junctional) and associated hypotension, so a transvenous pacemaker was placed, and she was transferred to the CCU for further management. The patient was also administered a nicardipine drip for blood pressure control. In the CCU, the patient had a sinus rhythm at a regular rate, and her qrs complex was narrow. She did not require any pacing via her transvenous pacer, so it was removed. The patient was hemodynamically stable, and her nicardipine drip was stopped, and she was transitioned to her home oral blood pressure medications. The patient is hemodynamically stable, and she ambulated today. At this time, she is ready for transition to a telemetry floor.       ASSESSMENT & PLAN: 1. Junctional bradycardia: -The patient was noted to have a wide-complex (presumed junctional) bradycardia following tavr; most likely occurred in the setting of valve placement and transient blockade of the AV node   -Subsequent to transfer to the CCU, the patient has had a regular, narrow complex, sinus rhythm; her tvp has been removed   -No permanent pacemaker placement yesterday     2. TAVR: -The patient had a transcatheter aortic valve replacement done electively on 8/13 with structural cardiology   -Repeat TTE performed to be reviewed   -Rest of management as per structural cardiology     3. Anemia: -Hemoglobin decreased slightly to 11.3 today from greater than 12 yesterday  -Please continue to monitor with daily cbc    4. Hyperlipidemia: -Please continue patient on home atorvastatin     5. Hypertension: -Please continue patient on home HCTZ 12.5 daily and losartan 100 daily     6. Prophylactic measure: -Please continue heparin 5000 subq tid       For Follow-Up: 1. TTE read   2. MCOT placement as per structural cardiology           Vital Signs Last 24 Hrs  T(C): 36.8 (14 Aug 2020 11:00), Max: 36.8 (14 Aug 2020 11:00)  T(F): 98.3 (14 Aug 2020 11:00), Max: 98.3 (14 Aug 2020 11:00)  HR: 70 (14 Aug 2020 14:00) (66 - 84)  BP: 126/62 (14 Aug 2020 14:00) (98/56 - 170/83)  BP(mean): 95 (14 Aug 2020 14:00) (70 - 117)  RR: 20 (14 Aug 2020 14:00) (12 - 31)  SpO2: 97% (14 Aug 2020 14:00) (92% - 99%)  I&O's Summary    13 Aug 2020 07:01  -  14 Aug 2020 07:00  --------------------------------------------------------  IN: 745 mL / OUT: 1050 mL / NET: -305 mL    14 Aug 2020 07:01  -  14 Aug 2020 14:43  --------------------------------------------------------  IN: 200 mL / OUT: 1000 mL / NET: -800 mL          MEDICATIONS  (STANDING):  aspirin enteric coated 81 milliGRAM(s) Oral daily  atorvastatin 10 milliGRAM(s) Oral at bedtime  chlorhexidine 4% Liquid 1 Application(s) Topical <User Schedule>  clopidogrel Tablet 75 milliGRAM(s) Oral every 24 hours  heparin   Injectable 5000 Unit(s) SubCutaneous every 8 hours  hydrochlorothiazide 12.5 milliGRAM(s) Oral daily  losartan 100 milliGRAM(s) Oral daily  pantoprazole    Tablet 40 milliGRAM(s) Oral before breakfast    MEDICATIONS  (PRN):        LABS                                            11.3                  Neurophils% (auto):   x      (08-14 @ 02:09):    6.25 )-----------(103          Lymphocytes% (auto):  x                                             34.2                   Eosinphils% (auto):   x        Manual%: Neutrophils x    ; Lymphocytes x    ; Eosinophils x    ; Bands%: x    ; Blasts x                                    142    |  106    |  21                  Calcium: 8.9   / iCa: x      (08-14 @ 02:09)    ----------------------------<  110       Magnesium: 2.4                              4.2     |  27     |  1.28             Phosphorous: 4.0      TPro  5.8    /  Alb  3.6    /  TBili  0.5    /  DBili  x      /  AST  25     /  ALT  14     /  AlkPhos  66     14 Aug 2020 02:09

## 2020-08-14 NOTE — PROGRESS NOTE ADULT - ASSESSMENT
====================ASSESSMENT AND PLAN==============  The patient is an 79-year-old woman with a past medical history of severe-symptomatic aortic stenosis, ventricular conduction disease at baseline, hypertension, and hyperlipidemia who is admitted to the CCU for hemodynamic monitoring following the development of self-resolving junctional bradycardia following transcatheter aortic valve replacement. Her condition is now stable, and she is ready for transition to a telemetry floor.     ====================CARDIOVASCULAR==================    Mechaincal Circulatory Support: None     ==Hemodynamics==  -The patient's blood pressure is now measuring in the 120s-140s systolic; will home blood pressure medications, oral hydrochlorothiazide 12.5 mg and losartan 100 mg, this am     Preload (fluids, diuretics): losartan 100 mg; HCTZ 12.5 mg   Afterload (anti-hypertensives, pressors): losartan 100 mg     ==Pump==  - Hemodynamically stable at this time subsequent to aortic valve replacement   - Repeat TTE ordered per structural cardiology     Echo Date: 6/12/20; sever aortic stenosis diagnosed   LVEF: normal left ventricular systolic function                      Regional Wall Motion Abnormaility?:  [ ]Yes   [x] No, If Yes, Details  Diastolic function: left ventricular diastolic dysfunction noted   RV function: normal   Volume status: euvolemic     ==Coronaries==  -No known coronary artery disease; no change in management indicated     Last ischemic workup: 6/22/20; clean coronary arteries     ==Rhythm==  -Sinus rhythm noted on telemetry subsequent to transfer to CCU; no junctional rhythm noted; transvenous pacer removed   -No indication for permanent pacemaker placement at this time     Current rhythm: sinus rhythm   AM EKG Interpretation: normal ekg   Anti-arrhythmic therapies: none   TVP removed       ====================== NEUROLOGY=====================  Sedation [ ]Yes   [x] No  Delirium [ ]Yes   [x] No    -Alert and oriented at this time; no acute intervention necessary   ==================== RESPIRATORY======================  -The patient was transferred to the CCU on 2L nasal cannula; saturating greater than 95% on room air now; no acute intervention necessary     ABG - ( 13 Aug 2020 13:10 )  pH, Arterial: 7.40  pH, Blood: x     /  pCO2: 45    /  pO2: 117   / HCO3: 27    / Base Excess: 2.1   /  SaO2: 98          Blood Gas Arterial - Lytes,H+H,iCa,Lact: Performed In Lab (08-13-20 @ 13:10)        ===================== RENAL =========================  -Creatinine at baseline; electrolytes all within normal limits; will monitor intake and output   -Net negative approximately 300 ccs following admission     ==================== GASTROINTESTINAL===================  -The patient's last bowel movement was prior to admission  -Patient is on a dash diet  -No active concerns     Indication for Stress Ulcer Prophylaxis, [ ] Yes    [x] No   If Yes, Medication:       ========================INFECTIOUS DISEASE================  -white blood cell count not elevated; no fevers; no concerns at this time     ===================HEMATOLOGIC/ONC ===================  -Currently on heparin for dvt prophylaxis   -Continue with dual antiplatelet therapy in the setting of tavr today   -Hemoglobin decreased to 11.3 over the past 24 hours in the setting of recent procedure; will continue to monitor with daily cbc; no acute intervention necessary at this time     Hemoglobin: 12.2 g/dL (08-13-20 @ 13:19)  Hemoglobin: 13.4 g/dL (08-11-20 @ 10:42)    Platelet Count - Automated: 104 K/uL (08-13-20 @ 13:19)  Platelet Count - Automated: 127 K/uL (08-11-20 @ 10:42)    Chemical VTE Prophylaxis:  [ ] Lovenox    [x] SQH   [ ]NA  Systemic Anticogaulation:  [ ] Yes    [x] No,  If Yes, Medication and Indication:     aspirin enteric coated 81 milliGRAM(s) Oral daily  clopidogrel Tablet 75 milliGRAM(s) Oral every 24 hours  heparin   Injectable 5000 Unit(s) SubCutaneous every 8 hours    =======================    ENDOCRINE  =====================  -Hgb A1c 5.4; no acute concerns at this time     POCT Blood Glucose.: 108 mg/dL (08-13-20 @ 08:20)        atorvastatin 10 milliGRAM(s) Oral at bedtime    ======================= LINES/TUBES  =====================  Arterial Line: placed 8/13     Disposition: Will plan for transition to telemetry floor

## 2020-08-14 NOTE — PROGRESS NOTE ADULT - ASSESSMENT
78y/o female h/o HTN, HLD, NL LVEF, AS, LAHB s/p TAVR on 8/13 c/b transient LBBB post op. Pt was transferred to CCU for further management and with TVP. In CCU,  the patient has had a regular, narrow complex, sinus rhythm; her tvp has been removed, no indication for PPM as per EP.     8/14 Remains in NSR w/o bradyarrhythmias Transfer to floor, TTE no paravalvular leak. B/L groin sites stable. Plan to d/c with MCOT. Repeat EKG in AM.

## 2020-08-14 NOTE — PROGRESS NOTE ADULT - SUBJECTIVE AND OBJECTIVE BOX
Interval Hx; Events Overnight:  SUBJECTIVE: " I am doing okay I have no pain "    LABS:                11.3                 142  | 27   | 21           6.25  >-----------< 103     ------------------------< 110                   34.2                 4.2  | 106  | 1.28                                         Ca 8.9   Mg 2.4   Ph 4.0            VITAL SIGNS    Telemetry:  NSR 80s  Daily     Daily Weight in k.5 (14 Aug 2020 03:00)      Vital Signs Last 24 Hrs  T(C): 36.6 (20 @ 20:47), Max: 36.9 (20 @ 15:00)  T(F): 97.9 (20 @ 20:47), Max: 98.5 (20 @ 15:00)  HR: 78 (20 @ 20:47) (66 - 88)  BP: 119/74 (20 @ 20:47) (98/56 - 170/83)  RR: 17 (20 @ 20:47) (7 - 29)  SpO2: 95% (20 @ 20:47) (92% - 99%)             I&O's Detail    13 Aug 2020 07:  -  14 Aug 2020 07:00  --------------------------------------------------------  IN:    IV PiggyBack: 150 mL    niCARdipine Infusion: 75 mL    Oral Fluid: 520 mL  Total IN: 745 mL    OUT:    Voided: 1050 mL  Total OUT: 1050 mL    Total NET: -305 mL      14 Aug 2020 07:  -  14 Aug 2020 22:53  --------------------------------------------------------  IN:    Oral Fluid: 840 mL  Total IN: 840 mL    OUT:    Voided: 1150 mL  Total OUT: 1150 mL    Total NET: -310 mL                    GLUCOSE  CAPILLARY BLOOD GLUCOSE                      PHYSICAL EXAM      General: NAD, well appearing, in no distress  Neurology: A&O x3, non focal, no neuro deficits. Moves all extremities to command.  CV : s1 s2 RRR, no murmurs, gallops, clicks.   Lungs: clear to auscultation  Abdomen: soft, nontender, nondistended, positive bowel sounds, +BM  :    voiding          Extremities:    no  edema. + pedal pulses      Incision: b/l groin sites stable  Skin: intact, no lesions        MEDICATIONS  aspirin enteric coated 81 milliGRAM(s) Oral daily  atorvastatin 10 milliGRAM(s) Oral at bedtime  chlorhexidine 4% Liquid 1 Application(s) Topical <User Schedule>  clopidogrel Tablet 75 milliGRAM(s) Oral every 24 hours  heparin   Injectable 5000 Unit(s) SubCutaneous every 8 hours  hydrochlorothiazide 12.5 milliGRAM(s) Oral daily  losartan 100 milliGRAM(s) Oral daily  pantoprazole    Tablet 40 milliGRAM(s) Oral before breakfast      < from: TTE with Doppler (w/Cont) (20 @ 06:04) >  Conclusions:  1. Transcatheter aortic valvereplacement. Peak transaortic  valve gradient equals 16 mm Hg, mean transaortic valve  gradient equals 9 mm Hg, which is probably normal in the  presence of a transcatheter aortic valve replacement. ADIS  2.0 sqcm No paravalvular aortic valve regurgitation seen.  2. Normal left ventricular systolic function. No segmental  wall motion abnormalities. Endocardial visualization  enhanced with intravenous injection of Ultrasonic Enhancing  Agent (Definity).  3. The right ventricle is not well visualized; grossly  normal right ventricular systolic function.  4. Estimated pulmonary artery systolic pressure equals 38  mm Hg, assuming right atrial pressure equals 8 mm Hg,  consistent with borderline pulmonary pressures.  *** Compared with echocardiogram of 2020, no  significant changes noted.  ------------------------------------------------------------------------  Confirmed on  2020 - 15:21:04 by ANDRES García  ------------------------------------------------------------------------    < end of copied text >

## 2020-08-14 NOTE — PROGRESS NOTE ADULT - SUBJECTIVE AND OBJECTIVE BOX
Gerson Cook PGY1  pager [phone]     ALESSANDRO CONTRERAS  MRN-[id number]    HPI:  The patient is an 79-year-old woman with a past medical history of severe-symptomatic aortic stenosis, hypertension, and hyperlipidemia who presented to the hospital on  for elective trans-catheter aortic valve replacement. The patient had a known history of ventricular conduction delay with a documented history of right bundle branch block and left anterior fascicular block (left anterior fascicular fascicular block noted on EKG in 2020) and was noted to be bradycardic, with a junctional rhythm, and hypotensive subsequent to her procedure, so she had a transvenous pacer placed, and was transferred to the CCU for further management. The patient had a coronary artery catheterization in 2020, which demonstrated no evidence of obstructed flow in the coronary arteries. She has no documented history of AV-stacy conduction disease. Other than her need for an aortic valve replacement, the patient has no known personal or family history of cardiac disease.    24 HOUR EVENTS: The patient remained in a narrow-complex sinus rhythm following transfer to CCU, and she did not require any pacing, so her TVP was removed. Her am ekg demonstrates sinus rhythm with a narrow qrs complex.     REVIEW OF SYSTEMS:    CONSTITUTIONAL: No weakness, fevers or chills  RESPIRATORY: No cough, no shortness of breath  CARDIOVASCULAR: No chest pain, no palpitations  GASTROINTESTINAL: No abdominal pain, no epigastric pain, no bowel movements overnight   GENITOURINARY: No dysuria, frequency or hematuria  NEUROLOGICAL: No numbness, no weakness      ICU Vital Signs Last 24 Hrs  T(C): 36.8 (14 Aug 2020 11:00), Max: 36.8 (14 Aug 2020 11:00)  T(F): 98.3 (14 Aug 2020 11:00), Max: 98.3 (14 Aug 2020 11:00)  HR: 70 (14 Aug 2020 12:00) (66 - 86)  BP: 98/56 (14 Aug 2020 12:00) (98/56 - 170/83)  BP(mean): 71 (14 Aug 2020 12:00) (70 - 117)  ABP: 120/54 (14 Aug 2020 05:30) (102/48 - 142/58)  ABP(mean): 80 (14 Aug 2020 05:30) (68 - 88)  RR: 22 (14 Aug 2020 12:00) (12 - 31)  SpO2: 95% (14 Aug 2020 12:00) (92% - 99%)    I&O's Summary    13 Aug 2020 07:01  -  14 Aug 2020 07:00  --------------------------------------------------------  IN: 745 mL / OUT: 1050 mL / NET: -305 mL    14 Aug 2020 07:01  -  14 Aug 2020 14:16  --------------------------------------------------------  IN: 200 mL / OUT: 1000 mL / NET: -800 mL      CAPILLARY BLOOD GLUCOSE    POCT Blood Glucose.: 108 mg/dL (13 Aug 2020 08:20)      PHYSICAL EXAM:  GENERAL: Awake and alert, no acute distress, well-developed  HEAD:  Atraumatic, Normocephalic  EYES: EOMI grossly, conjunctiva and sclera clear  NECK: Supple, no lymphadenopathy, no JVD  CHEST/LUNG: CTAB; No wheezes, rales, or rhonchi  HEART: Regular rate and rhythm. Normal S1/S2. No murmurs, rubs, or gallops  ABDOMEN: Soft, non-tender, non-distended; normal bowel sounds, no organomegaly  EXTREMITIES:  left radial and bilateral femoral artery sites without swelling, erythema, thrill, or bruit; 2+ peripheral pulses b/l, No clubbing, cyanosis, or edema  NEUROLOGY: A&O x 3, no gross motor or sensory deficits     ============================I/O===========================   I&O's Detail    13 Aug 2020 07:01  -  14 Aug 2020 07:00  --------------------------------------------------------  IN:    IV PiggyBack: 150 mL    niCARdipine Infusion: 75 mL    Oral Fluid: 520 mL  Total IN: 745 mL    OUT:    Voided: 1050 mL  Total OUT: 1050 mL    Total NET: -305 mL      14 Aug 2020 07:01  -  14 Aug 2020 14:16  --------------------------------------------------------  IN:    Oral Fluid: 200 mL  Total IN: 200 mL    OUT:    Voided: 1000 mL  Total OUT: 1000 mL    Total NET: -800 mL        ============================ LABS =========================                        11.3   6.25  )-----------( 103      ( 14 Aug 2020 02:09 )             34.2     08-14    142  |  106  |  21  ----------------------------<  110<H>  4.2   |  27  |  1.28    Ca    8.9      14 Aug 2020 02:09  Phos  4.0     08-14  Mg     2.4     08-14    TPro  5.8<L>  /  Alb  3.6  /  TBili  0.5  /  DBili  x   /  AST  25  /  ALT  14  /  AlkPhos  66  08-14    LIVER FUNCTIONS - ( 14 Aug 2020 02:09 )  Alb: 3.6 g/dL / Pro: 5.8 g/dL / ALK PHOS: 66 U/L / ALT: 14 U/L / AST: 25 U/L / GGT: x           PT/INR - ( 13 Aug 2020 13:19 )   PT: 12.8 sec;   INR: 1.08 ratio         PTT - ( 13 Aug 2020 13:19 )  PTT:33.9 sec  ABG - ( 13 Aug 2020 13:10 )  pH, Arterial: 7.40  pH, Blood: x     /  pCO2: 45    /  pO2: 117   / HCO3: 27    / Base Excess: 2.1   /  SaO2: 98          Blood Gas Arterial, Lactate: 0.8 mmol/L (20 @ 13:10)      ======================Micro/Rad/Cardio=================  Telemtry: occasional pvcs; sinus rhythm with narrow qrs complexes noted   EKG: normal ekg this am   CXR: Reviewed; tavr in place   Echo: Transthoracic Echocardiogram:   Patient name: ALESSANDRO CONTRERAS  YOB: 1940   Age: 79 (F)   MR#: [id number]  Study Date: 2020  Location: O/PSonographer: Caryn Miles RDCS  Study quality: Technically fair  Referring Physician: JEOVANY SENIOR MD  Blood Pressure: 122/70 mmHg  Height: 165 cm  Weight: 99 kg  BSA: 2.1 m2  ------------------------------------------------------------------------  PROCEDURE: Transthoracic echocardiogram with 2-D, M-Mode  and complete spectral and color flow Doppler.  INDICATION: Nonrheumatic aortic (valve) stenosis (I35.0)  ------------------------------------------------------------------------  Dimensions:    Normal Values:  LA:     3.8    2.0 - 4.0 cm  Ao:     3.0    2.0 - 3.8 cm  SEPTUM: 1.6    0.6 - 1.2 cm  PWT:    1.30.6 - 1.1 cm  LVIDd:  4.7    3.0 - 5.6 cm  LVIDs:  3.5    1.8 - 4.0 cm  Derived variables:  LVMI: 136 g/m2  RWT: 0.55  Fractional short: 26 %  Doppler Peak Velocity (m/sec): AoV=3.0  ------------------------------------------------------------------------  Observations:  Mitral Valve: Mitral annular calcification, otherwise  normal mitral valve. Minimal mitral regurgitation.  Aortic Valve/Aorta: Calcified trileaflet aortic valve with  decreased opening. Peak transaortic valve gradient equals  36 mm Hg, mean transaortic valve gradient equals 23 mm Hg,  estimated aortic valve area equals 0.7 sqcm (by continuity  equation), aortic valve velocity time integral equals 59  cm, consistent with severe aortic stenosis.   Minimal aortic regurgitation.Peak left ventricular  outflow tract gradient equals 3 mm Hg, mean gradient is  equal to 1 mm Hg, LVOT velocity time integral equals 13 cm.  Aortic Root: 3 cm.  LVOT diameter: 2 cm.  Left Atrium: Normal left atrium.  LA volume index = 24  cc/m2.  Left Ventricle: Normal left ventricular systolic function.  Moderate concentric left ventricular hypertrophy.  Right Heart: Normal right atrium. Normal right ventricular  size and function. Normal tricuspid valve. Minimal  tricuspid regurgitation. Normalpulmonic valve. Minimal  pulmonic regurgitation.  Pericardium/Pleura: Normal pericardium with no pericardial  effusion.  Hemodynamic: Estimated right atrial pressure is 8 mm Hg.  Estimated right ventricular systolic pressure equals 30 mm  Hg, assuming right atrial pressure equals 8 mm Hg,  consistent with normal pulmonary pressures.  ------------------------------------------------------------------------  Conclusions:  1. Calcified trileaflet aortic valve with decreased  opening. Peak transaortic valve gradient equals 36 mm Hg,  mean transaortic valve gradient equals 23 mm Hg, estimated  aortic valve area equals 0.7 sqcm (by continuity equation),  aortic valve velocity time integral equals 59 cm,  consistent with severe aortic stenosis.  2. Moderate concentric left ventricular hypertrophy.  3. Normal left ventricular systolic function.  Stroke volume index 27 ml/meter square.  Findings  consistent with low gradient severe aortic stenosis with  preserved ejection fraction.  ------------------------------------------------------------------------  Confirmed on  2020 - 14:32:12 by Vazquez Stuart M.D.  ------------------------------------------------------------------------ (20 @ 12:16)  Transthoracic Echocardiogram:   Patient name: ALESSANDRO CONTRERAS  YOB: 1940   Age: 78 (F)   MR#: [id number]  Study Date: 2019  Location: O/PSonographer: KAREN Cintron  Study quality: Technically fair  Referring Physician: SHIRLEY LOPEZ MD / JEOVANY SENIOR MD / Lb Mccall MD  Blood Pressure: 124/69 mmHg  Height: 165 cm  Weight: 101 kg  BSA: 2.1 m2  Heart Rate: 76 mmHg  ------------------------------------------------------------------------  PROCEDURE: Transthoracic echocardiogram with 2-D, M-Mode  and complete spectral and color flow Doppler.  INDICATION: Nonrheumatic aortic valve disorder, unspecified  (I35.9)  ------------------------------------------------------------------------  MEASUREMENTS: (See below)  ------------------------------------------------------------------------  OBSERVATIONS:  Mitral Valve: No mitral regurgitation seen.  Aortic Root: Aortic Root: 3.5 cm.  LVOT diameter: 2 cm.  Aortic Valve: Calcified trileaflet aortic valve. Peak  transaortic valve gradient equals 36 mm Hg, mean  transaortic valve gradient equals 20 mm Hg, estimated  aortic valve area equals 1 sqcm (by continuity equation),  aortic valve velocity time integral equals 58 cm, the DVI=  0.30, consistent with moderate aortic stenosis. Minimal  aortic regurgitation.  Peak left ventricular outflow tract  gradient equals 3 mm Hg, mean gradient is equal to 2 mm Hg,  LVOT velocity time integral equals 17 cm.  Left Atrium: The left atrium is mildly dilated.  Left Ventricle: Normal left ventricular systolic function.  No segmental wall motion abnormalities.  The LVEF= 60-65%.  Normal left ventricular size. Reversal of the E-A  waves of  the mitral inflow pattern is consistent with diastolic LV  dysfunction.  Right Heart: The right atrium is not small but can not be  accurately measured. Normal right ventricular size and  systolic function. Normal tricuspid valve. Mild tricuspid  regurgitation. Normal pulmonic valve. Minimal pulmonic  regurgitation.  Pericardium/PleuraThere is a trivial pericardial effusion  near the right ventricle.  Hemodynamic: The IVC is normal in size.  The estimated  right atrial pressure is normal. Estimated right  ventricular systolic pressure equals 31 mm Hg, assuming  right atrial pressure equals 8 mm Hg, consistent with  normal pulmonary pressures.  ------------------------------------------------------------------------  CONCLUSIONS:  1. Calcified trileaflet aortic valve. Peak transaortic  valve gradient equals 36 mm Hg, mean transaortic valve  gradient equals 20 mm Hg, estimated aortic valve area  equals 1 sqcm (by continuity equation), aortic valve  velocity time integral equals 58 cm, the DVI= 0.30,  consistent with moderate aortic stenosis. Minimal aortic  regurgitation.  2. The left atrium is mildly dilated.  3. Normal left ventricular systolic function. No segmental  wall motion abnormalities.  The LVEF= 60-65%.  4. Normal right ventricular size and systolic function.  ------------------------------------------------------------------------  PROCEDURE DESCRIPTION: Transthoracic echocardiogram with  2-D, M-Mode and complete spectral and color flow Doppler.  ------------------------------------------------------------------------  ECHOCARDIOGRAPHIC EXAMINATION:  AORTIC ROOT:  Aortic Root (Leaflet): 3.4 cm  Aortic Root Index: 1.0  LEFT ATRIUM:  AP Dimensions: 3.1 cm  LEFT VENTRICLE:  LVIDd: 4.8 cm  LVIDs: 2.3 cm  IVS: 1.58  ILWT: 1.3 cm  RWT: 0.57  LV Mass: 298.0 gm  LV Mass Index: 144 gm/sqm  EF (Visual Estimate): 60-65%  HR and BP:  HR: 76 bpm  BP: 124/69  BSA: 2.07  ------------------------------------------------------------------------  HEMODYNAMICS:  RA Pressure Estimate: 8  LA Pressure Estimate: < 20  PAS: 31  IVRT: 111 ms  ------------------------------------------------------------------------  COLOR FLOW and SPECIAL DOPPLER:  AORTIC VALVE:  AV Peak Velocity: 3.0 M/sec  AV Peak Gradient: 36 mm Hg  AV Mean Gradient: 20 mm Hg  Valve Area: 1.0 sqcm  LVOT:  LVOT Velocity: .9 M/sec  LVOT Diameter: 2.0 cm  LVOT Peak Gradient Rest: 3 mm Hg  LVOT Mean Gradient Rest: 2 mm Hg  ------------------------------------------------------------------------  DIASTOLIC FUNCTION:  E/A: 0.57  e' Septal: 4.0 cm/s  E/e' Septal: 10.0  e' Lateral: 3.0 cm/s  E/e' Lateral: 13.3  MV E wave: 0.4 m/s  MV A wave: 0.7 m/s  Septal a': 7.0 cm/s  Lateral a': 6.0 cm/s  ------------------------------------------------------------------------  Confirmed on  2019 - 12:05:12 by Mitra Machado M.D.  ------------------------------------------------------------------------ (19 @ 10:46)    Cath: Cardiac Cath Lab - Adult:   [facility]  Department of Cardiology  [address] [place] [address] [phone]  Cath Lab Report -- Comprehensive Report  Patient: ALESSANDRO CONTRERAS  Study date: 2020  Account number: [id number]  MR number: [id number]  : 1940  Gender: Female  Race: W  Case Physician(s):  Shirley Lopez M.D.  Fellow:  MARY GRACE Laughlin M.D.  Referring Physician:  Shirley Lopez M.D.  INDICATIONS: Aortic valve stenosis. Progressive dyspnea  HISTORY: valve: history of severe stenosis. The patient has hypertension  and medication-treated dyslipidemia.  PRIOR DIAGNOSTIC TEST RESULTS: Echocardiography: severe aortic valve  stenosis.  PROCEDURE:  --  Right heart catheterization.  --  Left coronary angiography.  --  Right coronary angiography.  --  Sonosite - Diagnostic.  TECHNIQUE: The risks and alternatives of the procedures and conscious  sedation were explained to the patient and informed consent was obtained.  Cardiac catheterization performed electively.  Local anesthetic given. Right femoral artery access. A 5FR SHEATH PINNACLE  was inserted in the vessel. Right femoral vein access. A 7FR SHEATH  PINNACLE was inserted in the vessel. Right heart catheterization. The  procedure was performed utilizing a 7FR SWAN RAFAL catheter. Left coronary  artery angiography. The vessel was injected utilizing a 5FR FL4.0 EXPO  catheter. Right coronary artery angiography. The vessel was injected  utilizing a 5FR FR4.0 EXPO catheter. Sonosite - Diagnostic.  CONTRAST GIVEN: Omnipaque 0 ml.  MEDICATIONS GIVEN: Fentanyl, 1 mcg, IV. Midazolam, 25 mg, IV.  VENTRICLES: There was no diagnostic evidence of left ventricular regional  wall motion abnormalities. Global left ventricular function was normal. EF  by echo was 60 %. The left ventricle was normal in size.  VALVES: AORTIC VALVE: There was severe aortic stenosis. No significant  aortic valve gradient. MITRAL VALVE: The mitral valve exhibited no  regurgitation.  CORONARY VESSELS: The coronary circulation is right dominant. There was no  angiographic evidence for occlusive coronary artery disease.  LM:   --  LM: The vessel was medium sized. Angiography showed no evidence  of disease.  LAD:   --  LAD: The vessel was medium to large sized.Angiography showed no  evidence of disease.  CX:   --  Circumflex: The vessel was medium sized. Angiography showed no  evidence of disease.  RCA:   --  RCA: The vessel was medium to large sized. Angiography showed no  evidence of disease.  AORTA: Theroot exhibited normal size.  COMPLICATIONS: There were no complications.  SUMMARY:  CORONARY VESSELS: There was no angiographic evidence for occlusive coronary  artery disease.  CARDIAC STRUCTURES: Global left ventricular function was normal. EF by echo  was 60 %.  DIAGNOSTIC RECOMMENDATIONS: Proceed with TAVR  Prepared and signed by  Shirley Lopez M.D.  Signed 2020 11:55:08  HEMODYNAMIC TABLES  Pressures:  Baseline  Pressures:  - HR: 67  Pressures:  - Rhythm:  Pressures:  -- Pulmonary Artery (S/D/M): 13  Pressures:  -- Pulmonary Capillary Wedge:   Pressures:  -- Right Atrium (a/v/M): 6/3/  Pressures:  -- Right Ventricle (s/edp): /--  Outputs:  Baseline  Outputs:  -- CALCULATIONS[age]  Outputs:  -- CALCULATIONS: Body Surface Area: 2.05  Outputs:  -- CALCULATIONS: Height in cm: 165.00  Outputs:  -- CALCULATIONS: Sex: Female  Outputs:  -- CALCULATIONS: Weight in k.90  Outputs:  -- OUTPUTS: O2 consumption: 275.00  Outputs:  -- OUTPUTS: Vo2 Indexed: 134.07 (20 @ 11:16)    ======================================================  PAST MEDICAL & SURGICAL HISTORY:  VISHNU (obstructive sleep apnea): Dx 2015 - does not use CPAP  Severe aortic stenosis  Hyperlipemia  Hypertension  Breast CA: , right, s/p lumpectomy, RT, Arimidex for 5 years  S/P breast lumpectomy: , right, for breast CA, followed by RT and Arimidex for 5 years  S/P cataract surgery: bilateral lens implants  H/O eye surgery: PRK bilateral eyes

## 2020-08-14 NOTE — PROGRESS NOTE ADULT - ATTENDING COMMENTS
I have personally seen, examined and participated in the care of this patient. I have reviewed all pertinent clinical information, including history, physical exam, plan and the resident's note. I agree with the resident's note with the following additions:    Pre-procedure left anterior fasicular block now s/p TAVR with Core valve   Had left bundle branch block after valve deployment which then transitioned to junctional (narrow) escape  Now in sinus rhythm, TVP removed  Off Cardene, on outpatient anti-hypertensive regimen  ASA, Plavix, statin  O2 sats mid to high 90s on nasal cannula  DASH diet  Normal renal function  H/H normal  Afebrile, no antibiotics  Sugars controlled I have personally seen, examined and participated in the care of this patient. I have reviewed all pertinent clinical information, including history, physical exam, plan and the resident's note. I agree with the resident's note with the following additions:    Pre-procedure left anterior fasicular block now s/p TAVR with Core valve   Had left bundle branch block after valve deployment which then transitioned to junctional (narrow) escape  Now in sinus rhythm, TVP removed  Off Cardene, on outpatient anti-hypertensive regimen  ASA, Plavix, statin  O2 sats mid to high 90s on room air  DASH diet  Normal renal function  H/H downtrending but acceptable, no signs of active bleeding - will monitor  Afebrile, no antibiotics  Sugars controlled

## 2020-08-14 NOTE — PROGRESS NOTE ADULT - ATTENDING COMMENTS
Ms. Blackman is POD 1 s/p TF TAVR (#29 CV) for severe symptomatic AS    - Clinically doing well.  No cardiopulmonary complaints.  - Continue aspirin 81mg daily and clopidogrel 75mg daily.  Signs/symptoms of bleeding were reviewed.  May take Tylenol for discomfort.  - Continue losartan.  - Continue atorvastatin.  - Discussed indications for placement of a MCOT.  - EKG/telemetry is stable in nature.  - Do to concern for change in bundle branch block at this time would recommend to observe for an additional 24 hours.  Continue telemetry monitoring.  Daily EKG  - TTE to be performed prior to discharge.  - Out of bed to chair as tolerated.  - Aim for K>4 and Mg > 2.    All questions and concerns of the patient and her  were addressed.

## 2020-08-15 ENCOUNTER — FORM ENCOUNTER (OUTPATIENT)
Age: 80
End: 2020-08-15

## 2020-08-15 ENCOUNTER — TRANSCRIPTION ENCOUNTER (OUTPATIENT)
Age: 80
End: 2020-08-15

## 2020-08-15 LAB
ALBUMIN SERPL ELPH-MCNC: 3.6 G/DL — SIGNIFICANT CHANGE UP (ref 3.3–5)
ALP SERPL-CCNC: 67 U/L — SIGNIFICANT CHANGE UP (ref 40–120)
ALT FLD-CCNC: 13 U/L — SIGNIFICANT CHANGE UP (ref 10–45)
ANION GAP SERPL CALC-SCNC: 9 MMOL/L — SIGNIFICANT CHANGE UP (ref 5–17)
AST SERPL-CCNC: 22 U/L — SIGNIFICANT CHANGE UP (ref 10–40)
BILIRUB SERPL-MCNC: 0.5 MG/DL — SIGNIFICANT CHANGE UP (ref 0.2–1.2)
BUN SERPL-MCNC: 22 MG/DL — SIGNIFICANT CHANGE UP (ref 7–23)
CALCIUM SERPL-MCNC: 9.3 MG/DL — SIGNIFICANT CHANGE UP (ref 8.4–10.5)
CHLORIDE SERPL-SCNC: 107 MMOL/L — SIGNIFICANT CHANGE UP (ref 96–108)
CO2 SERPL-SCNC: 26 MMOL/L — SIGNIFICANT CHANGE UP (ref 22–31)
CREAT SERPL-MCNC: 1.25 MG/DL — SIGNIFICANT CHANGE UP (ref 0.5–1.3)
GLUCOSE SERPL-MCNC: 96 MG/DL — SIGNIFICANT CHANGE UP (ref 70–99)
HCT VFR BLD CALC: 34.3 % — LOW (ref 34.5–45)
HGB BLD-MCNC: 11.2 G/DL — LOW (ref 11.5–15.5)
MAGNESIUM SERPL-MCNC: 2.1 MG/DL — SIGNIFICANT CHANGE UP (ref 1.6–2.6)
MCHC RBC-ENTMCNC: 30.1 PG — SIGNIFICANT CHANGE UP (ref 27–34)
MCHC RBC-ENTMCNC: 32.7 GM/DL — SIGNIFICANT CHANGE UP (ref 32–36)
MCV RBC AUTO: 92.2 FL — SIGNIFICANT CHANGE UP (ref 80–100)
NRBC # BLD: 0 /100 WBCS — SIGNIFICANT CHANGE UP (ref 0–0)
PLATELET # BLD AUTO: 90 K/UL — LOW (ref 150–400)
POTASSIUM SERPL-MCNC: 4.1 MMOL/L — SIGNIFICANT CHANGE UP (ref 3.5–5.3)
POTASSIUM SERPL-SCNC: 4.1 MMOL/L — SIGNIFICANT CHANGE UP (ref 3.5–5.3)
PROT SERPL-MCNC: 6.1 G/DL — SIGNIFICANT CHANGE UP (ref 6–8.3)
RBC # BLD: 3.72 M/UL — LOW (ref 3.8–5.2)
RBC # FLD: 12.7 % — SIGNIFICANT CHANGE UP (ref 10.3–14.5)
SODIUM SERPL-SCNC: 142 MMOL/L — SIGNIFICANT CHANGE UP (ref 135–145)
WBC # BLD: 4.76 K/UL — SIGNIFICANT CHANGE UP (ref 3.8–10.5)
WBC # FLD AUTO: 4.76 K/UL — SIGNIFICANT CHANGE UP (ref 3.8–10.5)

## 2020-08-15 PROCEDURE — 99232 SBSQ HOSP IP/OBS MODERATE 35: CPT

## 2020-08-15 PROCEDURE — 93010 ELECTROCARDIOGRAM REPORT: CPT

## 2020-08-15 RX ORDER — OMEGA-3 ACID ETHYL ESTERS 1 G
1 CAPSULE ORAL
Qty: 0 | Refills: 0 | DISCHARGE

## 2020-08-15 RX ORDER — ATORVASTATIN CALCIUM 80 MG/1
1 TABLET, FILM COATED ORAL
Qty: 0 | Refills: 0 | DISCHARGE

## 2020-08-15 RX ORDER — LOSARTAN POTASSIUM 100 MG/1
1 TABLET, FILM COATED ORAL
Qty: 30 | Refills: 0
Start: 2020-08-15 | End: 2020-09-13

## 2020-08-15 RX ORDER — ASPIRIN/CALCIUM CARB/MAGNESIUM 324 MG
1 TABLET ORAL
Qty: 30 | Refills: 0
Start: 2020-08-15 | End: 2020-09-13

## 2020-08-15 RX ORDER — LOSARTAN/HYDROCHLOROTHIAZIDE 100MG-25MG
1 TABLET ORAL
Qty: 0 | Refills: 0 | DISCHARGE

## 2020-08-15 RX ORDER — PANTOPRAZOLE SODIUM 20 MG/1
1 TABLET, DELAYED RELEASE ORAL
Qty: 30 | Refills: 0
Start: 2020-08-15 | End: 2020-09-13

## 2020-08-15 RX ORDER — CLOPIDOGREL BISULFATE 75 MG/1
1 TABLET, FILM COATED ORAL
Qty: 30 | Refills: 0
Start: 2020-08-15 | End: 2020-09-13

## 2020-08-15 RX ORDER — ATORVASTATIN CALCIUM 80 MG/1
1 TABLET, FILM COATED ORAL
Qty: 30 | Refills: 0
Start: 2020-08-15 | End: 2020-09-13

## 2020-08-15 RX ORDER — ACETAMINOPHEN 500 MG
2 TABLET ORAL
Qty: 0 | Refills: 0 | DISCHARGE
Start: 2020-08-15

## 2020-08-15 RX ADMIN — Medication 12.5 MILLIGRAM(S): at 05:16

## 2020-08-15 RX ADMIN — CHLORHEXIDINE GLUCONATE 1 APPLICATION(S): 213 SOLUTION TOPICAL at 11:16

## 2020-08-15 RX ADMIN — Medication 81 MILLIGRAM(S): at 11:15

## 2020-08-15 RX ADMIN — CLOPIDOGREL BISULFATE 75 MILLIGRAM(S): 75 TABLET, FILM COATED ORAL at 11:00

## 2020-08-15 RX ADMIN — PANTOPRAZOLE SODIUM 40 MILLIGRAM(S): 20 TABLET, DELAYED RELEASE ORAL at 05:16

## 2020-08-15 RX ADMIN — HEPARIN SODIUM 5000 UNIT(S): 5000 INJECTION INTRAVENOUS; SUBCUTANEOUS at 05:15

## 2020-08-15 RX ADMIN — ATORVASTATIN CALCIUM 10 MILLIGRAM(S): 80 TABLET, FILM COATED ORAL at 20:08

## 2020-08-15 RX ADMIN — LOSARTAN POTASSIUM 100 MILLIGRAM(S): 100 TABLET, FILM COATED ORAL at 05:16

## 2020-08-15 RX ADMIN — HEPARIN SODIUM 5000 UNIT(S): 5000 INJECTION INTRAVENOUS; SUBCUTANEOUS at 20:08

## 2020-08-15 RX ADMIN — HEPARIN SODIUM 5000 UNIT(S): 5000 INJECTION INTRAVENOUS; SUBCUTANEOUS at 13:03

## 2020-08-15 NOTE — DISCHARGE NOTE PROVIDER - NS AS DC PROVIDER CONTACT Y/N MULTI
[address] , HOME INSTRUCTIONS TO PT VERBALIZED UNDERSTANDING  1210 DISCHARGED AMBULATORY STABLE HOME. Yes

## 2020-08-15 NOTE — DISCHARGE NOTE PROVIDER - CARE PROVIDERS DIRECT ADDRESSES
[email or web address]

## 2020-08-15 NOTE — DISCHARGE NOTE PROVIDER - CARE PROVIDER_API CALL
Lb Mccall  THORACIC AND CARDIAC SURGERY  [address] [place] [address]  Phone: [phone]  Fax: [phone]  Scheduled Appointment: 08/18/2020    Geovani Hawthorne  INTERVENTIONAL CARDIOLOGY  [address] [place] [address]  Phone: [phone]  Fax: [phone]  Follow Up Time: 1 month    Alex Pineda  CARDIAC ELECTROPHYSIOLOGY  [address] [place] [address]  Phone: [phone]  Fax: [phone]  Follow Up Time: 1 week

## 2020-08-15 NOTE — PROGRESS NOTE ADULT - REASON FOR ADMISSION
bradycardia following tavr

## 2020-08-15 NOTE — DISCHARGE NOTE PROVIDER - PROVIDER TOKENS
PROVIDER:[TOKEN:[[id number]],SCHEDULEDAPPT:[08/18/2020]],PROVIDER:[TOKEN:[[id number]],FOLLOWUP:[1 month]],PROVIDER:[TOKEN:[[id number]],FOLLOWUP:[1 week]]

## 2020-08-15 NOTE — PROGRESS NOTE ADULT - SUBJECTIVE AND OBJECTIVE BOX
Structural Heart Team    Subjective  No acute events reported overnight.  The patient denies any cardiopulmonary complaints at rest or upon exertion.  Denies any light-headed sensation, dizziness or palpitations.  No significant pain noted in her groin sites bilaterally.      REVIEW OF SYSTEMS:  14 point review of systems is otherwise unremarkable except what is described above in the history of present illness    Telemetry  Sinus rhythm with rates inteh 60s to 70s with no heart block/pauses with PVCS and trigeminy  EKG NSR, LAD, LBBB   (was 175)   (was 96)  QTc 476 (was 3473)    Physical Examination  General: NAD, obese, alert and oriented times three, appropriate affect  JVD is not elevated, supple, no lymphadenopathy  Cor: RRR with normal s1s2, no murmurs/rub/gallops  Pulm: CTA b/l, no w/r/r  Gastointestinal: soft, nontender, nondistended, +bowel sounds  Extremities: no edema, 1+ DP pulses b/l  Groin: Clean and dry, soft w/ no hematoma, nontender  Neuro: A&Ox3, no focal deficits  T(C): 36.7 (08-15-20 @ 12:45), Max: 36.9 (08-14-20 @ 15:00)  HR: 88 (08-15-20 @ 12:45) (67 - 88)  BP: 126/78 (08-15-20 @ 12:45) (112/57 - 138/78)  RR: 16 (08-15-20 @ 12:45) (7 - 27)  SpO2: 97% (08-15-20 @ 12:45) (95% - 98%)  Wt(kg): --  08-14 @ 07:01  -  08-15 @ 07:00  --------------------------------------------------------  IN: 840 mL / OUT: 1550 mL / NET: -710 mL      MEDICATIONS  (STANDING):  aspirin enteric coated 81 milliGRAM(s) Oral daily  atorvastatin 10 milliGRAM(s) Oral at bedtime  chlorhexidine 4% Liquid 1 Application(s) Topical <User Schedule>  clopidogrel Tablet 75 milliGRAM(s) Oral every 24 hours  heparin   Injectable 5000 Unit(s) SubCutaneous every 8 hours  hydrochlorothiazide 12.5 milliGRAM(s) Oral daily  losartan 100 milliGRAM(s) Oral daily  pantoprazole    Tablet 40 milliGRAM(s) Oral before breakfast    MEDICATIONS  (PRN):  acetaminophen   Tablet .. 650 milliGRAM(s) Oral every 6 hours PRN Mild Pain (1 - 3)      Home Medications:  acetaminophen 325 mg oral tablet: 2 tab(s) orally every 6 hours, As needed, Mild Pain (1 - 3) (15 Aug 2020 10:58)                        11.2   4.76  )-----------( 90       ( 15 Aug 2020 06:28 )             34.3   08-15    142  |  107  |  22  ----------------------------<  96  4.1   |  26  |  1.25    Ca    9.3      15 Aug 2020 06:27  Phos  4.0     08-14  Mg     2.1     08-15    TPro  6.1  /  Alb  3.6  /  TBili  0.5  /  DBili  x   /  AST  22  /  ALT  13  /  AlkPhos  67  08-15    Assessment/Plan  Ms. Blackman is POD 2 s/p TF TAVR (#29 CV) for severe symptomatic AS    - Patient was noted prior to TAVR to have incomplete block with subsequent development of LBBB that appeared to resolve.  Repeat EKG this morning demonstrated recurrence of LBBB.  Recommend to monitor telemetry with repeat EKG in the morning.  - Clinically doing well.  No cardiopulmonary complaints.  - Continue aspirin 81mg daily and clopidogrel 75mg daily.  Signs/symptoms of bleeding were reviewed.  May take Tylenol for discomfort.  - Continue HCTZ 12.5mg PO QD and losartan 100mg PO QD.  - Continue atorvastatin.  - Discussed again indications for placement of a MCOT.  - EKG/telemetry is stable in nature.  - Continue telemetry monitoring.  Daily EKG  - TTE to be performed prior to discharge.  - Out of bed to chair as tolerated.  - Aim for K>4 and Mg > 2.    All questions and concerns of the patient.  She is agreeable to be monitored    Findings discussed with Dr. Mccall, Dr. Pineda and cardiac surgery team Structural Heart Team    Subjective  No acute events reported overnight.  The patient denies any cardiopulmonary complaints at rest or upon exertion.  Denies any light-headed sensation, dizziness or palpitations.  No significant pain noted in her groin sites bilaterally.      REVIEW OF SYSTEMS:  14 point review of systems is otherwise unremarkable except what is described above in the history of present illness    Telemetry  Sinus rhythm with rates inteh 60s to 70s with no heart block/pauses with PVCS and trigeminy  EKG NSR, LAD, LBBB   (was 175)   (was 96)  QTc 476 (was 3473)    Physical Examination  General: NAD, obese, alert and oriented times three, appropriate affect  JVD is not elevated, supple, no lymphadenopathy  Cor: RRR with normal s1s2, no murmurs/rub/gallops  Pulm: CTA b/l, no w/r/r  Gastointestinal: soft, nontender, nondistended, +bowel sounds  Extremities: no edema, 1+ DP pulses b/l  Groin: Clean and dry, soft w/ no hematoma, nontender  Neuro: A&Ox3, no focal deficits  T(C): 36.7 (08-15-20 @ 12:45), Max: 36.9 (08-14-20 @ 15:00)  HR: 88 (08-15-20 @ 12:45) (67 - 88)  BP: 126/78 (08-15-20 @ 12:45) (112/57 - 138/78)  RR: 16 (08-15-20 @ 12:45) (7 - 27)  SpO2: 97% (08-15-20 @ 12:45) (95% - 98%)  Wt(kg): --  08-14 @ 07:01  -  08-15 @ 07:00  --------------------------------------------------------  IN: 840 mL / OUT: 1550 mL / NET: -710 mL      MEDICATIONS  (STANDING):  aspirin enteric coated 81 milliGRAM(s) Oral daily  atorvastatin 10 milliGRAM(s) Oral at bedtime  chlorhexidine 4% Liquid 1 Application(s) Topical <User Schedule>  clopidogrel Tablet 75 milliGRAM(s) Oral every 24 hours  heparin   Injectable 5000 Unit(s) SubCutaneous every 8 hours  hydrochlorothiazide 12.5 milliGRAM(s) Oral daily  losartan 100 milliGRAM(s) Oral daily  pantoprazole    Tablet 40 milliGRAM(s) Oral before breakfast    MEDICATIONS  (PRN):  acetaminophen   Tablet .. 650 milliGRAM(s) Oral every 6 hours PRN Mild Pain (1 - 3)      Home Medications:  acetaminophen 325 mg oral tablet: 2 tab(s) orally every 6 hours, As needed, Mild Pain (1 - 3) (15 Aug 2020 10:58)                        11.2   4.76  )-----------( 90       ( 15 Aug 2020 06:28 )             34.3   08-15    142  |  107  |  22  ----------------------------<  96  4.1   |  26  |  1.25    Ca    9.3      15 Aug 2020 06:27  Phos  4.0     08-14  Mg     2.1     08-15    TPro  6.1  /  Alb  3.6  /  TBili  0.5  /  DBili  x   /  AST  22  /  ALT  13  /  AlkPhos  67  08-15    Assessment/Plan  Ms. Blackman is POD 2 s/p TF TAVR (#29 CV) for severe symptomatic AS    - Patient was noted prior to TAVR to have incomplete block with subsequent development of LBBB that appeared to resolve.  Repeat EKG this morning demonstrated recurrence of LBBB.  Recommend to monitor telemetry with repeat EKG in the morning.  - Clinically doing well.  No cardiopulmonary complaints.  - Continue aspirin 81mg daily and clopidogrel 75mg daily.  Signs/symptoms of bleeding were reviewed.  May take Tylenol for discomfort.  - Continue HCTZ 12.5mg PO QD and losartan 100mg PO QD.  - Continue atorvastatin.  - Discussed again indications for placement of a MCOT.  - EKG/telemetry is stable in nature.  - Continue telemetry monitoring.  Daily EKG  - TTE was personally reviewed and device is in an appropriate position function well.  - Out of bed to chair as tolerated.  - Aim for K>4 and Mg > 2.    All questions and concerns of the patient.  She is agreeable to be monitored    Findings discussed with Dr. Mccall, Dr. Pineda and cardiac surgery team

## 2020-08-15 NOTE — DISCHARGE NOTE PROVIDER - NSDCMRMEDTOKEN_GEN_ALL_CORE_FT
acetaminophen 325 mg oral tablet: 2 tab(s) orally every 6 hours, As needed, Mild Pain (1 - 3)  aspirin 81 mg oral delayed release tablet: 1 tab(s) orally once a day  clopidogrel 75 mg oral tablet: 1 tab(s) orally every 24 hours  hydroCHLOROthiazide 12.5 mg oral capsule: 1 cap(s) orally once a day  Lipitor 10 mg oral tablet: 1 tab(s) orally once a day (at bedtime)  losartan 100 mg oral tablet: 1 tab(s) orally once a day  pantoprazole 40 mg oral delayed release tablet: 1 tab(s) orally once a day (before a meal) acetaminophen 325 mg oral tablet: 2 tab(s) orally every 6 hours, As needed, Mild Pain (1 - 3)  aspirin 81 mg oral delayed release tablet: 1 tab(s) orally once a day  clopidogrel 75 mg oral tablet: 1 tab(s) orally once a day  hydroCHLOROthiazide 12.5 mg oral capsule: 1 cap(s) orally once a day  Lipitor 10 mg oral tablet: 1 tab(s) orally once a day (at bedtime)  losartan 100 mg oral tablet: 1 tab(s) orally once a day  pantoprazole 40 mg oral delayed release tablet: 1 tab(s) orally once a day (before a meal)

## 2020-08-15 NOTE — PROGRESS NOTE ADULT - ASSESSMENT
78y/o female h/o HTN, HLD, NL LVEF, AS, LAHB s/p TAVR on 8/13 c/b transient LBBB post op. Pt was transferred to CCU for further management and with TVP. In CCU,  the patient has had a regular, narrow complex, sinus rhythm; her tvp has been removed, no indication for PPM as per EP.     8/14 Remains in NSR w/o bradyarrhythmias Transfer to floor, TTE no paravalvular leak. B/L groin sites stable. Plan to d/c with MCOT. Repeat EKG in AM.   8/15 Pt cleared for discharge this am. EKG reviewed by EP consult. WIll discharge home w/ MCOT 78y/o female h/o HTN, HLD, NL LVEF, AS, LAHB s/p TAVR on 8/13 c/b transient LBBB post op. Pt was transferred to CCU for further management and with TVP. In CCU,  the patient has had a regular, narrow complex, sinus rhythm; her tvp has been removed, no indication for PPM as per EP.     8/14 Remains in NSR w/o bradyarrhythmias Transfer to floor, TTE no paravalvular leak. B/L groin sites stable. Plan to d/c with MCOT. Repeat EKG in AM.   8/15 EKG demonstrates interval increase in IA and QRS segments.  Defer discharge today. Continue to hold AV stacy blockers

## 2020-08-15 NOTE — DISCHARGE NOTE PROVIDER - NSDCFUSCHEDAPPT_GEN_ALL_CORE_FT
ALESSANDRO CONTRERAS ; 09/08/2020 ; [facility] [id number]  ALESSANDRO CONTRERAS ; 09/09/2020 ; [facility] Cardio [phone]

## 2020-08-15 NOTE — DISCHARGE NOTE PROVIDER - NSDCFUADDINST_GEN_ALL_CORE_FT
Resume activity as tolerated  Resume low cholesterol low sodium diet  Shower daily and wash all incisions with soap and water  Ambulate at least four times daily  Use incentive spirometer every hour during the day  Prophylactic antibiotic prior to dental procedures   Record daily weight and report changes greater than 3lbs  Please follow up with your cardiologist in 7-10 days  Please follow up with Dr Mccall 8/18 Tuesday. Please call office for an appointment   Please follow up with Dr Hawthorne in 1 month for repeat echo. Please call for an appointment

## 2020-08-15 NOTE — PROGRESS NOTE ADULT - SUBJECTIVE AND OBJECTIVE BOX
VITAL SIGNS    Telemetry:  SR 60-70  -ital Signs Last 24 Hrs  T(C): 36.4 (08-15-20 @ 05:00), Max: 36.9 (08-14-20 @ 15:00)  T(F): 97.6 (08-15-20 @ 05:00), Max: 98.5 (08-14-20 @ 15:00)  HR: 67 (08-15-20 @ 05:00) (67 - 88)  BP: 138/78 (08-15-20 @ 05:00) (98/56 - 138/78)  RR: 18 (08-15-20 @ 05:00) (7 - 27)  SpO2: 97% (08-15-20 @ 05:00) (95% - 98%)            08-14 @ 07:01  -  08-15 @ 07:00  --------------------------------------------------------  IN: 840 mL / OUT: 1550 mL / NET: -710 mL       Daily   Admit Wt: Drug Dosing Weight  Height (cm): 165.1 (13 Aug 2020 12:20)  Weight (kg): 100.2 (13 Aug 2020 10:27)  BMI (kg/m2): 36.8 (13 Aug 2020 12:20)  BSA (m2): 2.06 (13 Aug 2020 12:20)    Bilirubin Total, Serum: 0.5 mg/dL (08-15 @ 06:27)    CAPILLARY BLOOD GLUCOSE              MEDICATIONS  acetaminophen   Tablet .. 650 milliGRAM(s) Oral every 6 hours PRN  aspirin enteric coated 81 milliGRAM(s) Oral daily  atorvastatin 10 milliGRAM(s) Oral at bedtime  chlorhexidine 4% Liquid 1 Application(s) Topical <User Schedule>  clopidogrel Tablet 75 milliGRAM(s) Oral every 24 hours  heparin   Injectable 5000 Unit(s) SubCutaneous every 8 hours  hydrochlorothiazide 12.5 milliGRAM(s) Oral daily  losartan 100 milliGRAM(s) Oral daily  pantoprazole    Tablet 40 milliGRAM(s) Oral before breakfast      >>> <<<  PHYSICAL EXAM  Subjective: NAD OOB chair  Neurology: alert and oriented x 3, nonfocal, no gross deficits  CV : s1s2  Lungs: CTA b/l  Abdomen: soft, NT,ND, (+ )BM  :  voiding  Extremities:  -c/c/e b/l no hematoma to b/l groin    LABS  08-15    142  |  107  |  22  ----------------------------<  96  4.1   |  26  |  1.25    Ca    9.3      15 Aug 2020 06:27  Phos  4.0     08-14  Mg     2.1     08-15    TPro  6.1  /  Alb  3.6  /  TBili  0.5  /  DBili  x   /  AST  22  /  ALT  13  /  AlkPhos  67  08-15                                 11.2   4.76  )-----------( 90       ( 15 Aug 2020 06:28 )             34.3          PT/INR - ( 13 Aug 2020 13:19 )   PT: 12.8 sec;   INR: 1.08 ratio         PTT - ( 13 Aug 2020 13:19 )  PTT:33.9 sec       PAST MEDICAL & SURGICAL HISTORY:  VISHUN (obstructive sleep apnea): Dx 2015 - does not use CPAP  Severe aortic stenosis  Hyperlipemia  Hypertension  Breast CA: 2002, right, s/p lumpectomy, RT, Arimidex for 5 years  S/P breast lumpectomy: 2002, right, for breast CA, followed by RT and Arimidex for 5 years  S/P cataract surgery: bilateral lens implants  H/O eye surgery: PRK bilateral eyes

## 2020-08-15 NOTE — DISCHARGE NOTE PROVIDER - NSDCCPTREATMENT_GEN_ALL_CORE_FT
PRINCIPAL PROCEDURE  Procedure: Transcatheter aortic valve replacement (TAVR) by transaortic approach  Findings and Treatment:

## 2020-08-15 NOTE — DISCHARGE NOTE PROVIDER - HOSPITAL COURSE
80y/o female h/o HTN, HLD, NL LVEF, AS, LAHB s/p TAVR on 8/13 c/b transient LBBB post op. Pt was transferred to CCU for further management and with TVP. In CCU,  the patient has had a regular, narrow complex, sinus rhythm; her tvp has been removed, no indication for PPM as per EP.         8/14 Remains in NSR w/o bradyarrhythmias Transfer to floor, TTE no paravalvular leak. B/L groin sites stable. Plan to d/c with MCOT. Repeat EKG in AM.     8/15 Pt cleared for discharge this am. EKG reviewed by EP consult. WIll discharge home w/ MCOT 80y/o female h/o HTN, HLD, NL LVEF, AS, LAHB s/p TAVR on 8/13 c/b transient LBBB post op. Pt was transferred to CCU for further management and with TVP. In CCU,  the patient has had a regular, narrow complex, sinus rhythm; her tvp has been removed, no indication for PPM as per EP.         8/14 Remains in NSR w/o bradyarrhythmias Transfer to floor, TTE no paravalvular leak. B/L groin sites stable. Plan to d/c with MCOT. Repeat EKG in AM.     8/15 EKG demonstrates interval increase in CA and QRS segments.  Defer discharge today. Continue to hold AV stacy blockers 80y/o female h/o HTN, HLD, NL LVEF, AS, LAHB s/p TAVR on 8/13 c/b transient LBBB post op. Pt was transferred to CCU for further management and with TVP. In CCU,  the patient has had a regular, narrow complex, sinus rhythm; her tvp has been removed, no indication for PPM as per EP.         8/14 Remains in NSR w/o bradyarrhythmias Transfer to floor, TTE no paravalvular leak. B/L groin sites stable. Plan to d/c with MCOT. Repeat EKG in AM.     8/15 EKG demonstrates interval increase in IA and QRS segments.  Defer discharge today. Continue to hold AV stacy blockers     8/16 EKG unchanged. Stable for discharge today with MCOT

## 2020-08-16 ENCOUNTER — TRANSCRIPTION ENCOUNTER (OUTPATIENT)
Age: 80
End: 2020-08-16

## 2020-08-16 VITALS
TEMPERATURE: 98 F | OXYGEN SATURATION: 96 % | RESPIRATION RATE: 16 BRPM | SYSTOLIC BLOOD PRESSURE: 113 MMHG | HEART RATE: 66 BPM | DIASTOLIC BLOOD PRESSURE: 63 MMHG

## 2020-08-16 LAB
ALBUMIN SERPL ELPH-MCNC: 3.6 G/DL — SIGNIFICANT CHANGE UP (ref 3.3–5)
ALP SERPL-CCNC: 65 U/L — SIGNIFICANT CHANGE UP (ref 40–120)
ALT FLD-CCNC: 13 U/L — SIGNIFICANT CHANGE UP (ref 10–45)
ANION GAP SERPL CALC-SCNC: 10 MMOL/L — SIGNIFICANT CHANGE UP (ref 5–17)
AST SERPL-CCNC: 20 U/L — SIGNIFICANT CHANGE UP (ref 10–40)
BASOPHILS # BLD AUTO: 0.03 K/UL — SIGNIFICANT CHANGE UP (ref 0–0.2)
BASOPHILS NFR BLD AUTO: 0.7 % — SIGNIFICANT CHANGE UP (ref 0–2)
BILIRUB SERPL-MCNC: 0.4 MG/DL — SIGNIFICANT CHANGE UP (ref 0.2–1.2)
BUN SERPL-MCNC: 25 MG/DL — HIGH (ref 7–23)
CALCIUM SERPL-MCNC: 9.5 MG/DL — SIGNIFICANT CHANGE UP (ref 8.4–10.5)
CHLORIDE SERPL-SCNC: 105 MMOL/L — SIGNIFICANT CHANGE UP (ref 96–108)
CO2 SERPL-SCNC: 27 MMOL/L — SIGNIFICANT CHANGE UP (ref 22–31)
CREAT SERPL-MCNC: 1.27 MG/DL — SIGNIFICANT CHANGE UP (ref 0.5–1.3)
EOSINOPHIL # BLD AUTO: 0.18 K/UL — SIGNIFICANT CHANGE UP (ref 0–0.5)
EOSINOPHIL NFR BLD AUTO: 4.2 % — SIGNIFICANT CHANGE UP (ref 0–6)
GLUCOSE SERPL-MCNC: 106 MG/DL — HIGH (ref 70–99)
HCT VFR BLD CALC: 34 % — LOW (ref 34.5–45)
HGB BLD-MCNC: 10.9 G/DL — LOW (ref 11.5–15.5)
IMM GRANULOCYTES NFR BLD AUTO: 0.5 % — SIGNIFICANT CHANGE UP (ref 0–1.5)
LYMPHOCYTES # BLD AUTO: 1.13 K/UL — SIGNIFICANT CHANGE UP (ref 1–3.3)
LYMPHOCYTES # BLD AUTO: 26.1 % — SIGNIFICANT CHANGE UP (ref 13–44)
MAGNESIUM SERPL-MCNC: 2.1 MG/DL — SIGNIFICANT CHANGE UP (ref 1.6–2.6)
MCHC RBC-ENTMCNC: 29.9 PG — SIGNIFICANT CHANGE UP (ref 27–34)
MCHC RBC-ENTMCNC: 32.1 GM/DL — SIGNIFICANT CHANGE UP (ref 32–36)
MCV RBC AUTO: 93.2 FL — SIGNIFICANT CHANGE UP (ref 80–100)
MONOCYTES # BLD AUTO: 0.41 K/UL — SIGNIFICANT CHANGE UP (ref 0–0.9)
MONOCYTES NFR BLD AUTO: 9.5 % — SIGNIFICANT CHANGE UP (ref 2–14)
NEUTROPHILS # BLD AUTO: 2.56 K/UL — SIGNIFICANT CHANGE UP (ref 1.8–7.4)
NEUTROPHILS NFR BLD AUTO: 59 % — SIGNIFICANT CHANGE UP (ref 43–77)
NRBC # BLD: 0 /100 WBCS — SIGNIFICANT CHANGE UP (ref 0–0)
PHOSPHATE SERPL-MCNC: 3.7 MG/DL — SIGNIFICANT CHANGE UP (ref 2.5–4.5)
PLATELET # BLD AUTO: 94 K/UL — LOW (ref 150–400)
POTASSIUM SERPL-MCNC: 3.8 MMOL/L — SIGNIFICANT CHANGE UP (ref 3.5–5.3)
POTASSIUM SERPL-SCNC: 3.8 MMOL/L — SIGNIFICANT CHANGE UP (ref 3.5–5.3)
PROT SERPL-MCNC: 6.2 G/DL — SIGNIFICANT CHANGE UP (ref 6–8.3)
RBC # BLD: 3.65 M/UL — LOW (ref 3.8–5.2)
RBC # FLD: 12.4 % — SIGNIFICANT CHANGE UP (ref 10.3–14.5)
SODIUM SERPL-SCNC: 142 MMOL/L — SIGNIFICANT CHANGE UP (ref 135–145)
WBC # BLD: 4.33 K/UL — SIGNIFICANT CHANGE UP (ref 3.8–10.5)
WBC # FLD AUTO: 4.33 K/UL — SIGNIFICANT CHANGE UP (ref 3.8–10.5)

## 2020-08-16 PROCEDURE — C1887: CPT

## 2020-08-16 PROCEDURE — 86901 BLOOD TYPING SEROLOGIC RH(D): CPT

## 2020-08-16 PROCEDURE — 86850 RBC ANTIBODY SCREEN: CPT

## 2020-08-16 PROCEDURE — 82330 ASSAY OF CALCIUM: CPT

## 2020-08-16 PROCEDURE — 93005 ELECTROCARDIOGRAM TRACING: CPT

## 2020-08-16 PROCEDURE — G0463: CPT

## 2020-08-16 PROCEDURE — 71045 X-RAY EXAM CHEST 1 VIEW: CPT

## 2020-08-16 PROCEDURE — 82435 ASSAY OF BLOOD CHLORIDE: CPT

## 2020-08-16 PROCEDURE — P9016: CPT

## 2020-08-16 PROCEDURE — 85027 COMPLETE CBC AUTOMATED: CPT

## 2020-08-16 PROCEDURE — P9047: CPT

## 2020-08-16 PROCEDURE — 93880 EXTRACRANIAL BILAT STUDY: CPT

## 2020-08-16 PROCEDURE — 83735 ASSAY OF MAGNESIUM: CPT

## 2020-08-16 PROCEDURE — 84100 ASSAY OF PHOSPHORUS: CPT

## 2020-08-16 PROCEDURE — 80053 COMPREHEN METABOLIC PANEL: CPT

## 2020-08-16 PROCEDURE — 76000 FLUOROSCOPY <1 HR PHYS/QHP: CPT

## 2020-08-16 PROCEDURE — 82962 GLUCOSE BLOOD TEST: CPT

## 2020-08-16 PROCEDURE — C1889: CPT

## 2020-08-16 PROCEDURE — C1894: CPT

## 2020-08-16 PROCEDURE — 86923 COMPATIBILITY TEST ELECTRIC: CPT

## 2020-08-16 PROCEDURE — 84295 ASSAY OF SERUM SODIUM: CPT

## 2020-08-16 PROCEDURE — 99232 SBSQ HOSP IP/OBS MODERATE 35: CPT

## 2020-08-16 PROCEDURE — 86900 BLOOD TYPING SEROLOGIC ABO: CPT

## 2020-08-16 PROCEDURE — 93010 ELECTROCARDIOGRAM REPORT: CPT

## 2020-08-16 PROCEDURE — 85730 THROMBOPLASTIN TIME PARTIAL: CPT

## 2020-08-16 PROCEDURE — 83605 ASSAY OF LACTIC ACID: CPT

## 2020-08-16 PROCEDURE — 80048 BASIC METABOLIC PNL TOTAL CA: CPT

## 2020-08-16 PROCEDURE — 84132 ASSAY OF SERUM POTASSIUM: CPT

## 2020-08-16 PROCEDURE — 85014 HEMATOCRIT: CPT

## 2020-08-16 PROCEDURE — 83036 HEMOGLOBIN GLYCOSYLATED A1C: CPT

## 2020-08-16 PROCEDURE — C9399: CPT

## 2020-08-16 PROCEDURE — 93355 ECHO TRANSESOPHAGEAL (TEE): CPT

## 2020-08-16 PROCEDURE — 82803 BLOOD GASES ANY COMBINATION: CPT

## 2020-08-16 PROCEDURE — 87641 MR-STAPH DNA AMP PROBE: CPT

## 2020-08-16 PROCEDURE — C1769: CPT

## 2020-08-16 PROCEDURE — 82947 ASSAY GLUCOSE BLOOD QUANT: CPT

## 2020-08-16 PROCEDURE — C8929: CPT

## 2020-08-16 PROCEDURE — U0003: CPT

## 2020-08-16 PROCEDURE — 86769 SARS-COV-2 COVID-19 ANTIBODY: CPT

## 2020-08-16 PROCEDURE — 85610 PROTHROMBIN TIME: CPT

## 2020-08-16 PROCEDURE — C1760: CPT

## 2020-08-16 PROCEDURE — 87640 STAPH A DNA AMP PROBE: CPT

## 2020-08-16 RX ORDER — CLOPIDOGREL BISULFATE 75 MG/1
75 TABLET, FILM COATED ORAL DAILY
Refills: 0 | Status: DISCONTINUED | OUTPATIENT
Start: 2020-08-16 | End: 2020-08-16

## 2020-08-16 RX ORDER — CLOPIDOGREL BISULFATE 75 MG/1
1 TABLET, FILM COATED ORAL
Qty: 30 | Refills: 0
Start: 2020-08-16 | End: 2020-09-14

## 2020-08-16 RX ADMIN — PANTOPRAZOLE SODIUM 40 MILLIGRAM(S): 20 TABLET, DELAYED RELEASE ORAL at 05:14

## 2020-08-16 RX ADMIN — Medication 81 MILLIGRAM(S): at 11:14

## 2020-08-16 RX ADMIN — CLOPIDOGREL BISULFATE 75 MILLIGRAM(S): 75 TABLET, FILM COATED ORAL at 11:14

## 2020-08-16 RX ADMIN — Medication 12.5 MILLIGRAM(S): at 05:14

## 2020-08-16 RX ADMIN — LOSARTAN POTASSIUM 100 MILLIGRAM(S): 100 TABLET, FILM COATED ORAL at 05:13

## 2020-08-16 RX ADMIN — HEPARIN SODIUM 5000 UNIT(S): 5000 INJECTION INTRAVENOUS; SUBCUTANEOUS at 05:15

## 2020-08-16 RX ADMIN — CHLORHEXIDINE GLUCONATE 1 APPLICATION(S): 213 SOLUTION TOPICAL at 08:05

## 2020-08-16 NOTE — DISCHARGE NOTE NURSING/CASE MANAGEMENT/SOCIAL WORK - PATIENT PORTAL LINK FT
You can access the FollowMyHealth Patient Portal offered by [facility] by registering at the following website: [email or web address]. By joining [facility]’s FollowMyHealth portal, you will also be able to view your health information using other applications (apps) compatible with our system.

## 2020-08-17 ENCOUNTER — TRANSCRIPTION ENCOUNTER (OUTPATIENT)
Age: 80
End: 2020-08-17

## 2020-08-17 PROBLEM — Z09 FOLLOW UP: Status: ACTIVE | Noted: 2020-08-17

## 2020-08-18 ENCOUNTER — APPOINTMENT (OUTPATIENT)
Dept: ELECTROPHYSIOLOGY | Facility: CLINIC | Age: 80
End: 2020-08-18
Payer: MEDICARE

## 2020-08-18 ENCOUNTER — NON-APPOINTMENT (OUTPATIENT)
Age: 80
End: 2020-08-18

## 2020-08-18 ENCOUNTER — APPOINTMENT (OUTPATIENT)
Dept: CARDIOTHORACIC SURGERY | Facility: CLINIC | Age: 80
End: 2020-08-18
Payer: MEDICARE

## 2020-08-18 VITALS
HEART RATE: 78 BPM | BODY MASS INDEX: 35.99 KG/M2 | WEIGHT: 216 LBS | TEMPERATURE: 98 F | HEIGHT: 65 IN | OXYGEN SATURATION: 95 % | RESPIRATION RATE: 16 BRPM

## 2020-08-18 VITALS — DIASTOLIC BLOOD PRESSURE: 79 MMHG | SYSTOLIC BLOOD PRESSURE: 134 MMHG

## 2020-08-18 VITALS
SYSTOLIC BLOOD PRESSURE: 135 MMHG | HEIGHT: 65 IN | HEART RATE: 83 BPM | WEIGHT: 213 LBS | BODY MASS INDEX: 35.49 KG/M2 | OXYGEN SATURATION: 97 % | DIASTOLIC BLOOD PRESSURE: 83 MMHG

## 2020-08-18 DIAGNOSIS — Z09 ENCOUNTER FOR FOLLOW-UP EXAMINATION AFTER COMPLETED TREATMENT FOR CONDITIONS OTHER THAN MALIGNANT NEOPLASM: ICD-10-CM

## 2020-08-18 LAB
ALBUMIN SERPL ELPH-MCNC: 4.3 G/DL
ALP BLD-CCNC: 80 U/L
ALT SERPL-CCNC: 18 U/L
ANION GAP SERPL CALC-SCNC: 17 MMOL/L
AST SERPL-CCNC: 27 U/L
BILIRUB SERPL-MCNC: 0.6 MG/DL
BUN SERPL-MCNC: 24 MG/DL
CALCIUM SERPL-MCNC: 10 MG/DL
CHLORIDE SERPL-SCNC: 106 MMOL/L
CO2 SERPL-SCNC: 23 MMOL/L
CREAT SERPL-MCNC: 1.25 MG/DL
GLUCOSE SERPL-MCNC: 100 MG/DL
POTASSIUM SERPL-SCNC: 4.2 MMOL/L
PROT SERPL-MCNC: 6.9 G/DL
SODIUM SERPL-SCNC: 146 MMOL/L

## 2020-08-18 PROCEDURE — 99214 OFFICE O/P EST MOD 30 MIN: CPT

## 2020-08-18 PROCEDURE — 99213 OFFICE O/P EST LOW 20 MIN: CPT

## 2020-08-18 PROCEDURE — 93000 ELECTROCARDIOGRAM COMPLETE: CPT

## 2020-08-18 NOTE — HISTORY OF PRESENT ILLNESS
[FreeTextEntry1] : Feels great.  no complaints.  No chest pain.  No shortness of breath. No palpitations. No lightheadedness/dizziness.

## 2020-08-18 NOTE — DISCUSSION/SUMMARY
[FreeTextEntry1] : Transient LBBB post TAVR. WV is normal and no heart block.  QRS seems to have narrowed.  No indication for PPM at this time.

## 2020-08-18 NOTE — PHYSICAL EXAM
[Well Groomed] : well groomed [Normal Conjunctiva] : the conjunctiva exhibited no abnormalities [General Appearance - In No Acute Distress] : no acute distress [Eyelids - No Xanthelasma] : the eyelids demonstrated no xanthelasmas [Normal Oral Mucosa] : normal oral mucosa [No Oral Pallor] : no oral pallor [No Oral Cyanosis] : no oral cyanosis [Normal Jugular Venous A Waves Present] : normal jugular venous A waves present [Normal Jugular Venous V Waves Present] : normal jugular venous V waves present [No Jugular Venous Whittaker A Waves] : no jugular venous whittaker A waves [Respiration, Rhythm And Depth] : normal respiratory rhythm and effort [Exaggerated Use Of Accessory Muscles For Inspiration] : no accessory muscle use [Auscultation Breath Sounds / Voice Sounds] : lungs were clear to auscultation bilaterally [Heart Sounds] : normal S1 and S2 [Heart Rate And Rhythm] : heart rate and rhythm were normal [Systolic grade ___/6] : A grade [unfilled]/6 systolic murmur was heard. [Abdomen Soft] : soft [Abdomen Tenderness] : non-tender [Abnormal Walk] : normal gait [Abdomen Mass (___ Cm)] : no abdominal mass palpated [Nail Clubbing] : no clubbing of the fingernails [Cyanosis, Localized] : no localized cyanosis [Petechial Hemorrhages (___cm)] : no petechial hemorrhages [] : no rash [No Venous Stasis] : no venous stasis [Skin Color & Pigmentation] : normal skin color and pigmentation [Skin Lesions] : no skin lesions [No Skin Ulcers] : no skin ulcer [Affect] : the affect was normal [No Xanthoma] : no  xanthoma was observed [Oriented To Time, Place, And Person] : oriented to person, place, and time [Mood] : the mood was normal [No Anxiety] : not feeling anxious

## 2020-08-19 ENCOUNTER — TRANSCRIPTION ENCOUNTER (OUTPATIENT)
Age: 80
End: 2020-08-19

## 2020-08-20 ENCOUNTER — TRANSCRIPTION ENCOUNTER (OUTPATIENT)
Age: 80
End: 2020-08-20

## 2020-08-21 ENCOUNTER — APPOINTMENT (OUTPATIENT)
Dept: CARE COORDINATION | Facility: HOME HEALTH | Age: 80
End: 2020-08-21

## 2020-08-21 VITALS
BODY MASS INDEX: 35.82 KG/M2 | SYSTOLIC BLOOD PRESSURE: 135 MMHG | HEART RATE: 79 BPM | HEIGHT: 65 IN | WEIGHT: 215 LBS | DIASTOLIC BLOOD PRESSURE: 72 MMHG

## 2020-08-21 NOTE — HISTORY OF PRESENT ILLNESS
[Home] : at home, [unfilled] , at the time of the visit. [Other Location: e.g. Home (Enter Location, City,State)___] : at [unfilled] [Spouse] : spouse [Verbal consent obtained from patient] : the patient, [unfilled] [FreeTextEntry1] : FOLLOW YOUR HEART\par \par 8/17:  Hospital Course: 78y/o female h/o HTN, HLD, NL LVEF, AS, LAHB s/p TAVR on 8/13 c/b transient LBBB post op. Pt was transferred to CCU for further management and with TVP. In CCU, the patient has had a regular, narrow complex, sinus rhythm; her tvp has been removed, no indication for PPM as per EP. \par 8/14 Remains in NSR w/o bradyarrhythmias Transfer to floor, TTE no paravalvular leak. B/L groin sites stable. Plan to d/c with MCOT. Repeat EKG in AM. \par 8/15 EKG demonstrates interval increase in NM and QRS segments. Defer discharge today. Continue to hold AV stacy blockers \par 8/16 EKG unchanged. Stable for discharge today with MCOT\par \par Patient reports "feeling well."  Denies SOB, palpitations, dizziness or LE swelling.  Her spouse offers some questions surrounding MCOT device management.  She was seen by Dr. Mccall for post-op f/u on Tuesday 8/18 - no concerns identified.

## 2020-08-21 NOTE — ASSESSMENT
[FreeTextEntry1] : Overall, patient appears to be progressing well.  She is compliant with all medications as per DC instructions.  Ambulating as tolerated.  She was educated on monitoring daily weights and Na+ dietary restrictions.  Questions surrounding charging MCOT device answered, will need more adhesives - patient directed to [facility] help number located on box.\par \par Education\par 1.  DC instructions reviewed with patient - discussed importance of medications (indication, schedule, side effects, and importance of compliance reviewed) and f/u appointments.\par 2.  Clean incision sites daily - shower indirectly, clean with soap and water, pat dry.  Avoid the use of lotions, ointments, powders, and perfumes on or around incision sites.\par 3.  Sternal precautions - avoid any heavy lifting (>5-10 lbs x 8 weeks), raising both arms above head simultaneously.\par 4.  Place TEDs on in AM prior to getting out of bed and off in PM when returning to bed.\par 5.  Follow a heart healthy diet - low salt, low fat.\par 6.  Exercise daily.\par 7.  Monitor and call [facility] NP for signs and symptoms of infection (redness, drainage, and fever).\par 8.  Monitor daily weights (call for a gain of 2-3 lbs/day or 5-6 lbs/week). \par 9.  Blood sugar control necessary for wound healing if applicable.\par 10.  Avoid straining during BM - use stool softners as needed. \par 11.  Instructed on importance of incentive spirometry use (10x/hour).\par \par Patient verbalized understanding of all education outlined above. Pt instructed to call [facility] NP for any issues as delineated above.\par \par PLAN\par 1.  Monitor daily weights\par 2.  Continue current medications as prescribed\par 3.  Incentive spirometry use\par 4.  Maintain sternal precautions\par 5.  Exercise daily\par 5.  Maintain HH diet\par 6.  Maintain TEDs\par 7.  Keep legs elevated\par 8.  F/U appointments - \par CTSx Mccall 8/18\par Cardio Jessica 9/9\par PMD Pizzolla 9/8\par 9.  [facility] NP will continue to f/u with patient status - Pt agrees to call with any questions/concerns\par 10. To recover without complications.\par

## 2020-08-21 NOTE — PHYSICAL EXAM
[] : no respiratory distress [Abnormal Walk] : normal gait [Oriented To Time, Place, And Person] : oriented to person, place, and time [FreeTextEntry1] : B/L groin C/D/I

## 2020-09-08 ENCOUNTER — APPOINTMENT (OUTPATIENT)
Dept: INTERNAL MEDICINE | Facility: CLINIC | Age: 80
End: 2020-09-08
Payer: MEDICARE

## 2020-09-08 VITALS — DIASTOLIC BLOOD PRESSURE: 75 MMHG | SYSTOLIC BLOOD PRESSURE: 130 MMHG

## 2020-09-08 VITALS
DIASTOLIC BLOOD PRESSURE: 77 MMHG | HEIGHT: 65 IN | RESPIRATION RATE: 16 BRPM | TEMPERATURE: 97.6 F | WEIGHT: 218 LBS | HEART RATE: 69 BPM | OXYGEN SATURATION: 95 % | SYSTOLIC BLOOD PRESSURE: 159 MMHG | BODY MASS INDEX: 36.32 KG/M2

## 2020-09-08 PROCEDURE — 99214 OFFICE O/P EST MOD 30 MIN: CPT

## 2020-09-09 ENCOUNTER — APPOINTMENT (OUTPATIENT)
Dept: CARDIOLOGY | Facility: CLINIC | Age: 80
End: 2020-09-09
Payer: MEDICARE

## 2020-09-09 ENCOUNTER — NON-APPOINTMENT (OUTPATIENT)
Age: 80
End: 2020-09-09

## 2020-09-09 VITALS
BODY MASS INDEX: 36.65 KG/M2 | RESPIRATION RATE: 12 BRPM | DIASTOLIC BLOOD PRESSURE: 80 MMHG | HEIGHT: 65 IN | OXYGEN SATURATION: 95 % | TEMPERATURE: 96.9 F | HEART RATE: 60 BPM | SYSTOLIC BLOOD PRESSURE: 151 MMHG | WEIGHT: 220 LBS

## 2020-09-09 DIAGNOSIS — I44.4 LEFT ANTERIOR FASCICULAR BLOCK: ICD-10-CM

## 2020-09-09 PROCEDURE — 93000 ELECTROCARDIOGRAM COMPLETE: CPT

## 2020-09-09 PROCEDURE — 99214 OFFICE O/P EST MOD 30 MIN: CPT

## 2020-09-09 RX ORDER — CLOPIDOGREL 75 MG/1
75 TABLET, FILM COATED ORAL DAILY
Qty: 30 | Refills: 0 | Status: DISCONTINUED | COMMUNITY
End: 2020-09-09

## 2020-09-09 NOTE — HISTORY OF PRESENT ILLNESS
[FreeTextEntry1] : Rachel felt better as soon as the TAVR was completed. She is wearing a biotel because of a LBBB that is new. She denies any lightheadedness, dizziness, fatigue or shortness of breath. Here today with her daughter.  thinks even her color is better.

## 2020-09-09 NOTE — PHYSICAL EXAM
[General Appearance - Well Developed] : well developed [General Appearance - Well Nourished] : well nourished [Well Groomed] : well groomed [Normal Appearance] : normal appearance [No Deformities] : no deformities [Normal Conjunctiva] : the conjunctiva exhibited no abnormalities [General Appearance - In No Acute Distress] : no acute distress [Normal Oral Mucosa] : normal oral mucosa [Eyelids - No Xanthelasma] : the eyelids demonstrated no xanthelasmas [No Oral Pallor] : no oral pallor [Normal Jugular Venous A Waves Present] : normal jugular venous A waves present [Normal Jugular Venous V Waves Present] : normal jugular venous V waves present [No Oral Cyanosis] : no oral cyanosis [No Jugular Venous Whittaker A Waves] : no jugular venous whittaker A waves [Respiration, Rhythm And Depth] : normal respiratory rhythm and effort [Exaggerated Use Of Accessory Muscles For Inspiration] : no accessory muscle use [Heart Sounds] : normal S1 and S2 [Auscultation Breath Sounds / Voice Sounds] : lungs were clear to auscultation bilaterally [Heart Rate And Rhythm] : heart rate and rhythm were normal [Abdomen Tenderness] : non-tender [Abdomen Soft] : soft [Abdomen Mass (___ Cm)] : no abdominal mass palpated [Nail Clubbing] : no clubbing of the fingernails [Gait - Sufficient For Exercise Testing] : the gait was sufficient for exercise testing [Abnormal Walk] : normal gait [Cyanosis, Localized] : no localized cyanosis [Petechial Hemorrhages (___cm)] : no petechial hemorrhages [Skin Color & Pigmentation] : normal skin color and pigmentation [] : no rash [No Venous Stasis] : no venous stasis [Skin Lesions] : no skin lesions [No Skin Ulcers] : no skin ulcer [Oriented To Time, Place, And Person] : oriented to person, place, and time [Affect] : the affect was normal [No Xanthoma] : no  xanthoma was observed [No Anxiety] : not feeling anxious [Mood] : the mood was normal [Systolic grade ___/6] : A grade [unfilled]/6 systolic murmur was heard.

## 2020-09-09 NOTE — DISCUSSION/SUMMARY
[___ Month(s)] : [unfilled] month(s) [FreeTextEntry1] : The patient is an 79-year-old obese female history of hypertension, hyperlipidemia,  LVH,  aortic stenosis s/p TAVR who has markedly improved functional capacity and new bundle.\par #1 AS-  s/p TAVR, BIoTel in place, f/u Dr. Pineda, no indication at present for PPM. \par #2 Htn- controlled on losartan/HCTZ\par #3 Lipids- therapeutic on atorvastatin\par #4 General- We discussed adherence to a Mediterranean diet, weight loss and at least 30 minutes of daily exercise. May drive to . \par

## 2020-09-09 NOTE — REVIEW OF SYSTEMS
[see HPI] : see HPI [Negative] : Heme/Lymph [Recent Weight Gain (___ Lbs)] : no recent weight gain [Recent Weight Loss (___ Lbs)] : no recent weight loss [Shortness Of Breath] : no shortness of breath [Lower Ext Edema] : no extremity edema [Chest Pain] : no chest pain [Dyspnea on exertion] : not dyspnea during exertion [Palpitations] : no palpitations

## 2020-09-09 NOTE — REASON FOR VISIT
[Aortic Stenosis] : aortic stenosis [Abnormal ECG] : an abnormal ECG [Follow-Up - From Hospitalization] : follow-up of a recent hospitalization for

## 2020-09-15 ENCOUNTER — TRANSCRIPTION ENCOUNTER (OUTPATIENT)
Age: 80
End: 2020-09-15

## 2020-09-16 ENCOUNTER — APPOINTMENT (OUTPATIENT)
Dept: CARDIOTHORACIC SURGERY | Facility: CLINIC | Age: 80
End: 2020-09-16
Payer: MEDICARE

## 2020-09-16 ENCOUNTER — NON-APPOINTMENT (OUTPATIENT)
Age: 80
End: 2020-09-16

## 2020-09-16 ENCOUNTER — OUTPATIENT (OUTPATIENT)
Dept: OUTPATIENT SERVICES | Facility: HOSPITAL | Age: 80
LOS: 1 days | End: 2020-09-16
Payer: MEDICARE

## 2020-09-16 VITALS
WEIGHT: 215 LBS | HEART RATE: 75 BPM | BODY MASS INDEX: 35.82 KG/M2 | DIASTOLIC BLOOD PRESSURE: 76 MMHG | OXYGEN SATURATION: 96 % | HEIGHT: 65 IN | SYSTOLIC BLOOD PRESSURE: 121 MMHG | RESPIRATION RATE: 16 BRPM

## 2020-09-16 DIAGNOSIS — Z98.49 CATARACT EXTRACTION STATUS, UNSPECIFIED EYE: Chronic | ICD-10-CM

## 2020-09-16 DIAGNOSIS — I35.0 NONRHEUMATIC AORTIC (VALVE) STENOSIS: ICD-10-CM

## 2020-09-16 DIAGNOSIS — Z98.890 OTHER SPECIFIED POSTPROCEDURAL STATES: Chronic | ICD-10-CM

## 2020-09-16 PROCEDURE — 99215 OFFICE O/P EST HI 40 MIN: CPT

## 2020-09-16 PROCEDURE — 93000 ELECTROCARDIOGRAM COMPLETE: CPT

## 2020-09-16 PROCEDURE — 93306 TTE W/DOPPLER COMPLETE: CPT | Mod: 26

## 2020-09-16 PROCEDURE — 93306 TTE W/DOPPLER COMPLETE: CPT

## 2020-09-16 NOTE — PHYSICAL EXAM
[Sclera] : the sclera and conjunctiva were normal [PERRL With Normal Accommodation] : pupils were equal in size, round, and reactive to light [Extraocular Movements] : extraocular movements were intact [Neck Appearance] : the appearance of the neck was normal [Neck Cervical Mass (___cm)] : no neck mass was observed [Jugular Venous Distention Increased] : there was no jugular-venous distention [Thyroid Diffuse Enlargement] : the thyroid was not enlarged [Respiration, Rhythm And Depth] : normal respiratory rhythm and effort [Exaggerated Use Of Accessory Muscles For Inspiration] : no accessory muscle use [Auscultation Breath Sounds / Voice Sounds] : lungs were clear to auscultation bilaterally [Apical Impulse] : the apical impulse was normal [Heart Rate And Rhythm] : heart rate was normal and rhythm regular [Heart Sounds] : normal S1 and S2 [Heart Sounds Gallop] : no gallops [Murmurs] : no murmurs [Heart Sounds Pericardial Friction Rub] : no pericardial rub [Arterial Pulses Carotid] : carotid pulses were normal with no bruits [Abdominal Aorta] : the abdominal aorta was normal [Arterial Pulses Femoral] : femoral pulses were normal without bruits [Bowel Sounds] : normal bowel sounds [Abdomen Soft] : soft [Abdomen Tenderness] : non-tender [Abnormal Walk] : normal gait [Nail Clubbing] : no clubbing  or cyanosis of the fingernails [Musculoskeletal - Swelling] : no joint swelling seen [Skin Color & Pigmentation] : normal skin color and pigmentation [Skin Turgor] : normal skin turgor [] : no rash [Sensation] : the sensory exam was normal to light touch and pinprick [Motor Exam] : the motor exam was normal [Oriented To Time, Place, And Person] : oriented to person, place, and time [Impaired Insight] : insight and judgment were intact

## 2020-09-16 NOTE — HISTORY OF PRESENT ILLNESS
[Dyslipidemia] : Dyslipidemia [Hypertension] : Hypertension [FreeTextEntry1] : ALESSANDRO CONTRERAS 79 year year old F, presenting today for her  30-day follow up after her TAVR procedure on 8/13/2020 with a 29 mm CoreValve. Her post op course was significant for a transient post operative LBBB, and she was sent home on POD #3 with an MCOT.\par \par Today she presents stating she feels well since her TAVR. She has been exercising on a treadmill daily, using stairs and ambulating without any symptoms. She feels much better. She denies any fatigue, unlike before her TAVR, even going shopping for 3 hours yesterday without feeling tired. She denies any CP or dizziness. She denies any pedal edema or inability to lay flat. Her appetite has been good.\par \par 5 meter Gait:           Seconds\par Current NYHA Class: I\par

## 2020-09-16 NOTE — ASSESSMENT
[FreeTextEntry1] : Mrs. Blackman is continuing to recover well from her TAVR procedure, and is enjoying a better quality of life. I told her she can stop her clopidogrel. I did review her MCOT report, and noted multiple episodes of PSVT and 4 small runs of NSVT. I reviewed with Dr. Hawthorne who agreed that this should be treated. I explained to her the results, and started her on Metoprolol tartrate 12.5 mg po BID. She should continue to follow up with Jessica Petersen. I answered all other questions she had regarding the procedure. We will see her again in one year, however if there are any concerns, we will see her sooner.

## 2020-09-17 LAB
ALBUMIN SERPL ELPH-MCNC: 4.4 G/DL
ALP BLD-CCNC: 91 U/L
ALT SERPL-CCNC: 17 U/L
ANION GAP SERPL CALC-SCNC: 10 MMOL/L
AST SERPL-CCNC: 26 U/L
BASOPHILS # BLD AUTO: 0.05 K/UL
BASOPHILS NFR BLD AUTO: 0.8 %
BILIRUB SERPL-MCNC: 0.5 MG/DL
BUN SERPL-MCNC: 24 MG/DL
CALCIUM SERPL-MCNC: 10.1 MG/DL
CHLORIDE SERPL-SCNC: 105 MMOL/L
CO2 SERPL-SCNC: 27 MMOL/L
CREAT SERPL-MCNC: 1.35 MG/DL
EOSINOPHIL # BLD AUTO: 0.15 K/UL
EOSINOPHIL NFR BLD AUTO: 2.3 %
GLUCOSE SERPL-MCNC: 99 MG/DL
HCT VFR BLD CALC: 38.1 %
HGB BLD-MCNC: 12.5 G/DL
IMM GRANULOCYTES NFR BLD AUTO: 0.3 %
LYMPHOCYTES # BLD AUTO: 1.41 K/UL
LYMPHOCYTES NFR BLD AUTO: 21.4 %
MAN DIFF?: NORMAL
MCHC RBC-ENTMCNC: 30 PG
MCHC RBC-ENTMCNC: 32.8 GM/DL
MCV RBC AUTO: 91.4 FL
MONOCYTES # BLD AUTO: 0.52 K/UL
MONOCYTES NFR BLD AUTO: 7.9 %
NEUTROPHILS # BLD AUTO: 4.43 K/UL
NEUTROPHILS NFR BLD AUTO: 67.3 %
PLATELET # BLD AUTO: 145 K/UL
POTASSIUM SERPL-SCNC: 4.2 MMOL/L
PROT SERPL-MCNC: 7.2 G/DL
RBC # BLD: 4.17 M/UL
RBC # FLD: 13 %
SODIUM SERPL-SCNC: 142 MMOL/L
WBC # FLD AUTO: 6.58 K/UL

## 2020-09-29 ENCOUNTER — NON-APPOINTMENT (OUTPATIENT)
Age: 80
End: 2020-09-29

## 2020-09-29 ENCOUNTER — APPOINTMENT (OUTPATIENT)
Dept: ELECTROPHYSIOLOGY | Facility: CLINIC | Age: 80
End: 2020-09-29
Payer: MEDICARE

## 2020-09-29 VITALS
DIASTOLIC BLOOD PRESSURE: 82 MMHG | HEART RATE: 78 BPM | SYSTOLIC BLOOD PRESSURE: 130 MMHG | HEIGHT: 65 IN | OXYGEN SATURATION: 94 %

## 2020-09-29 PROCEDURE — 99213 OFFICE O/P EST LOW 20 MIN: CPT

## 2020-09-29 PROCEDURE — 93000 ELECTROCARDIOGRAM COMPLETE: CPT

## 2020-09-29 NOTE — DISCUSSION/SUMMARY
[FreeTextEntry1] : 79 year old woman s/p TAVR (8/13/2020). Post procedure with transient LBBB and discharged on Biotel.  Biotel with no HB and in fact, she conducts PAT.  Today's ECG shows LBBB again.  She was advised that there is no indication for pacing at this time and we can follow conservatively.  Follow up with Dr. Lopez. I will see "as needed?

## 2020-09-29 NOTE — PHYSICAL EXAM
[Well Groomed] : well groomed [General Appearance - In No Acute Distress] : no acute distress [Normal Conjunctiva] : the conjunctiva exhibited no abnormalities [Eyelids - No Xanthelasma] : the eyelids demonstrated no xanthelasmas [Normal Oral Mucosa] : normal oral mucosa [No Oral Pallor] : no oral pallor [No Oral Cyanosis] : no oral cyanosis [Normal Jugular Venous A Waves Present] : normal jugular venous A waves present [Normal Jugular Venous V Waves Present] : normal jugular venous V waves present [No Jugular Venous Whittaker A Waves] : no jugular venous whittaker A waves [Respiration, Rhythm And Depth] : normal respiratory rhythm and effort [Exaggerated Use Of Accessory Muscles For Inspiration] : no accessory muscle use [Auscultation Breath Sounds / Voice Sounds] : lungs were clear to auscultation bilaterally [Heart Rate And Rhythm] : heart rate and rhythm were normal [Heart Sounds] : normal S1 and S2 [Systolic grade ___/6] : A grade [unfilled]/6 systolic murmur was heard. [Abdomen Soft] : soft [Abdomen Tenderness] : non-tender [Abdomen Mass (___ Cm)] : no abdominal mass palpated [Abnormal Walk] : normal gait [Nail Clubbing] : no clubbing of the fingernails [Cyanosis, Localized] : no localized cyanosis [Petechial Hemorrhages (___cm)] : no petechial hemorrhages [Skin Color & Pigmentation] : normal skin color and pigmentation [] : no rash [No Venous Stasis] : no venous stasis [Skin Lesions] : no skin lesions [No Skin Ulcers] : no skin ulcer [No Xanthoma] : no  xanthoma was observed [Oriented To Time, Place, And Person] : oriented to person, place, and time [Affect] : the affect was normal [Mood] : the mood was normal [No Anxiety] : not feeling anxious

## 2020-09-29 NOTE — HISTORY OF PRESENT ILLNESS
[FreeTextEntry1] : No complaints. NO palpitations. No chest pain. Some mild shortness of breath on exertion, but much better since the procedure. No lightheadedness/dizziness.  She was started on a beta blocker (metoprolol 12.5) by structural heart team.

## 2020-09-30 ENCOUNTER — LABORATORY RESULT (OUTPATIENT)
Age: 80
End: 2020-09-30

## 2020-10-01 ENCOUNTER — LABORATORY RESULT (OUTPATIENT)
Age: 80
End: 2020-10-01

## 2020-10-02 ENCOUNTER — APPOINTMENT (OUTPATIENT)
Dept: INTERNAL MEDICINE | Facility: CLINIC | Age: 80
End: 2020-10-02

## 2020-10-02 LAB
25(OH)D3 SERPL-MCNC: 57.9 NG/ML
ALBUMIN SERPL ELPH-MCNC: 4.3 G/DL
ALP BLD-CCNC: 96 U/L
ALT SERPL-CCNC: 15 U/L
ANION GAP SERPL CALC-SCNC: 18 MMOL/L
APPEARANCE: ABNORMAL
AST SERPL-CCNC: 22 U/L
BASOPHILS # BLD AUTO: 0.05 K/UL
BASOPHILS NFR BLD AUTO: 1 %
BILIRUB SERPL-MCNC: 0.6 MG/DL
BILIRUBIN URINE: NEGATIVE
BLOOD URINE: NEGATIVE
BUN SERPL-MCNC: 25 MG/DL
CALCIUM SERPL-MCNC: 10 MG/DL
CHLORIDE SERPL-SCNC: 102 MMOL/L
CHOLEST SERPL-MCNC: 147 MG/DL
CHOLEST/HDLC SERPL: 3.4 RATIO
CO2 SERPL-SCNC: 23 MMOL/L
COLOR: YELLOW
CREAT SERPL-MCNC: 1.37 MG/DL
CREAT SPEC-SCNC: 145 MG/DL
EOSINOPHIL # BLD AUTO: 0.21 K/UL
EOSINOPHIL NFR BLD AUTO: 4.3 %
ESTIMATED AVERAGE GLUCOSE: 105 MG/DL
FERRITIN SERPL-MCNC: 288 NG/ML
FOLATE SERPL-MCNC: >20 NG/ML
GLUCOSE QUALITATIVE U: NEGATIVE
GLUCOSE SERPL-MCNC: 104 MG/DL
GLUCOSE SERPL-MCNC: 106 MG/DL
HBA1C MFR BLD HPLC: 5.3 %
HCT VFR BLD CALC: 40.6 %
HDLC SERPL-MCNC: 44 MG/DL
HGB BLD-MCNC: 12.8 G/DL
IMM GRANULOCYTES NFR BLD AUTO: 0.2 %
IRON SERPL-MCNC: 70 UG/DL
KETONES URINE: NEGATIVE
LDLC SERPL CALC-MCNC: 76 MG/DL
LEUKOCYTE ESTERASE URINE: ABNORMAL
LYMPHOCYTES # BLD AUTO: 1.14 K/UL
LYMPHOCYTES NFR BLD AUTO: 23.2 %
MAGNESIUM SERPL-MCNC: 2.1 MG/DL
MAN DIFF?: NORMAL
MCHC RBC-ENTMCNC: 29.5 PG
MCHC RBC-ENTMCNC: 31.5 GM/DL
MCV RBC AUTO: 93.5 FL
MICROALBUMIN 24H UR DL<=1MG/L-MCNC: 5.5 MG/DL
MICROALBUMIN/CREAT 24H UR-RTO: 38 MG/G
MONOCYTES # BLD AUTO: 0.29 K/UL
MONOCYTES NFR BLD AUTO: 5.9 %
NEUTROPHILS # BLD AUTO: 3.22 K/UL
NEUTROPHILS NFR BLD AUTO: 65.4 %
NITRITE URINE: NEGATIVE
PH URINE: 6.5
PLATELET # BLD AUTO: 134 K/UL
POTASSIUM SERPL-SCNC: 4.2 MMOL/L
PROT SERPL-MCNC: 6.9 G/DL
PROTEIN URINE: NORMAL
RBC # BLD: 4.34 M/UL
RBC # FLD: 12.9 %
SARS-COV-2 IGG SERPL IA-ACNC: <3.8 AU/ML
SARS-COV-2 IGG SERPL QL IA: NEGATIVE
SODIUM SERPL-SCNC: 143 MMOL/L
SPECIFIC GRAVITY URINE: 1.02
TRIGL SERPL-MCNC: 136 MG/DL
TSH SERPL-ACNC: 1.22 UIU/ML
UROBILINOGEN URINE: NORMAL
VIT B12 SERPL-MCNC: 967 PG/ML
WBC # FLD AUTO: 4.92 K/UL

## 2020-10-15 ENCOUNTER — APPOINTMENT (OUTPATIENT)
Dept: INTERNAL MEDICINE | Facility: CLINIC | Age: 80
End: 2020-10-15
Payer: MEDICARE

## 2020-10-15 VITALS
DIASTOLIC BLOOD PRESSURE: 84 MMHG | OXYGEN SATURATION: 94 % | TEMPERATURE: 97.1 F | WEIGHT: 217 LBS | RESPIRATION RATE: 12 BRPM | HEART RATE: 78 BPM | BODY MASS INDEX: 36.15 KG/M2 | HEIGHT: 65 IN | SYSTOLIC BLOOD PRESSURE: 138 MMHG

## 2020-10-15 PROCEDURE — 99214 OFFICE O/P EST MOD 30 MIN: CPT

## 2020-10-15 RX ORDER — METOPROLOL TARTRATE 25 MG/1
25 TABLET, FILM COATED ORAL
Qty: 30 | Refills: 1 | Status: DISCONTINUED | COMMUNITY
Start: 2020-09-16 | End: 2020-10-15

## 2020-10-15 NOTE — COUNSELING
[Potential consequences of obesity discussed] : Potential consequences of obesity discussed [Structured Weight Management Program suggested:] : Structured weight management program suggested [Encouraged to maintain food diary] : Encouraged to maintain food diary [Benefits of weight loss discussed] : Benefits of weight loss discussed [Encouraged to increase physical activity] : Encouraged to increase physical activity [Encouraged to use exercise tracking device] : Encouraged to use exercise tracking device [Weigh Self Weekly] : weigh self weekly [____ min/wk Activity] : [unfilled] min/wk activity [Decrease Portions] : decrease portions [Keep Food Diary] : keep food diary [Good understanding] : Patient has a good understanding of lifestyle changes and steps needed to achieve self management goal [None] : None [FreeTextEntry1] : SHWETA

## 2020-10-15 NOTE — PHYSICAL EXAM
[Well Nourished] : well nourished [No Acute Distress] : no acute distress [Well Developed] : well developed [Well-Appearing] : well-appearing [Normal Sclera/Conjunctiva] : normal sclera/conjunctiva [PERRL] : pupils equal round and reactive to light [Normal Outer Ear/Nose] : the outer ears and nose were normal in appearance [EOMI] : extraocular movements intact [Normal Oropharynx] : the oropharynx was normal [No JVD] : no jugular venous distention [No Lymphadenopathy] : no lymphadenopathy [Thyroid Normal, No Nodules] : the thyroid was normal and there were no nodules present [Supple] : supple [No Respiratory Distress] : no respiratory distress  [No Accessory Muscle Use] : no accessory muscle use [Normal Rate] : normal rate  [Regular Rhythm] : with a regular rhythm [Clear to Auscultation] : lungs were clear to auscultation bilaterally [No Murmur] : no murmur heard [Normal S1, S2] : normal S1 and S2 [No Carotid Bruits] : no carotid bruits [No Varicosities] : no varicosities [No Abdominal Bruit] : a ~M bruit was not heard ~T in the abdomen [Pedal Pulses Present] : the pedal pulses are present [No Edema] : there was no peripheral edema [No Palpable Aorta] : no palpable aorta [Soft] : abdomen soft [Non Tender] : non-tender [No Extremity Clubbing/Cyanosis] : no extremity clubbing/cyanosis [Non-distended] : non-distended [No Masses] : no abdominal mass palpated [Normal Bowel Sounds] : normal bowel sounds [No HSM] : no HSM [Normal Posterior Cervical Nodes] : no posterior cervical lymphadenopathy [No CVA Tenderness] : no CVA  tenderness [No Spinal Tenderness] : no spinal tenderness [Normal Anterior Cervical Nodes] : no anterior cervical lymphadenopathy [No Joint Swelling] : no joint swelling [No Rash] : no rash [Grossly Normal Strength/Tone] : grossly normal strength/tone [No Focal Deficits] : no focal deficits [Coordination Grossly Intact] : coordination grossly intact [Normal Affect] : the affect was normal [Normal Gait] : normal gait [Deep Tendon Reflexes (DTR)] : deep tendon reflexes were 2+ and symmetric [Normal Insight/Judgement] : insight and judgment were intact

## 2020-10-19 LAB
APPEARANCE: CLEAR
BACTERIA: ABNORMAL
BILIRUBIN URINE: NEGATIVE
BLOOD URINE: NEGATIVE
COLOR: YELLOW
GLUCOSE QUALITATIVE U: NEGATIVE
HYALINE CASTS: 0 /LPF
KETONES URINE: NEGATIVE
LEUKOCYTE ESTERASE URINE: ABNORMAL
MICROSCOPIC-UA: NORMAL
NITRITE URINE: POSITIVE
PH URINE: 6
PROTEIN URINE: NORMAL
RED BLOOD CELLS URINE: 0 /HPF
SPECIFIC GRAVITY URINE: 1.02
SQUAMOUS EPITHELIAL CELLS: 0 /HPF
UROBILINOGEN URINE: NORMAL
WHITE BLOOD CELLS URINE: 10 /HPF

## 2020-11-04 ENCOUNTER — NON-APPOINTMENT (OUTPATIENT)
Age: 80
End: 2020-11-04

## 2020-11-04 ENCOUNTER — APPOINTMENT (OUTPATIENT)
Dept: CARDIOLOGY | Facility: CLINIC | Age: 80
End: 2020-11-04
Payer: MEDICARE

## 2020-11-04 VITALS
HEIGHT: 65 IN | DIASTOLIC BLOOD PRESSURE: 87 MMHG | OXYGEN SATURATION: 95 % | BODY MASS INDEX: 36.32 KG/M2 | HEART RATE: 84 BPM | TEMPERATURE: 97.1 F | SYSTOLIC BLOOD PRESSURE: 165 MMHG | WEIGHT: 218 LBS | RESPIRATION RATE: 12 BRPM

## 2020-11-04 VITALS — DIASTOLIC BLOOD PRESSURE: 80 MMHG | SYSTOLIC BLOOD PRESSURE: 128 MMHG

## 2020-11-04 PROCEDURE — 93306 TTE W/DOPPLER COMPLETE: CPT

## 2020-11-04 PROCEDURE — 99214 OFFICE O/P EST MOD 30 MIN: CPT

## 2020-11-04 NOTE — DISCUSSION/SUMMARY
[___ Month(s)] : [unfilled] month(s) [FreeTextEntry1] : The patient is an 79-year-old obese female history of hypertension, hyperlipidemia,  LVH,  aortic stenosis s/p TAVR who has markedly improved functional capacity and new bundle.\par #1 AS-  s/p TAVR, f/u ECHO today,  no indication at present for PPM. \par #2 Htn- controlled on losartan/HCTZ\par #3 Lipids- therapeutic on atorvastatin\par #4 General- We discussed adherence to a Mediterranean diet, weight loss and at least 30 minutes of daily exercise. \par

## 2020-11-04 NOTE — HISTORY OF PRESENT ILLNESS
[FreeTextEntry1] : Rachel continues to feel great!. She cannot believe the difference. She denies any lightheadedness, dizziness, fatigue or shortness of breath.

## 2020-11-04 NOTE — REVIEW OF SYSTEMS
[see HPI] : see HPI [Recent Weight Gain (___ Lbs)] : no recent weight gain [Recent Weight Loss (___ Lbs)] : no recent weight loss [Shortness Of Breath] : no shortness of breath [Dyspnea on exertion] : not dyspnea during exertion [Chest Pain] : no chest pain [Lower Ext Edema] : no extremity edema [Palpitations] : no palpitations [Negative] : Heme/Lymph

## 2020-11-12 ENCOUNTER — APPOINTMENT (OUTPATIENT)
Dept: INTERNAL MEDICINE | Facility: CLINIC | Age: 80
End: 2020-11-12
Payer: MEDICARE

## 2020-11-12 VITALS
RESPIRATION RATE: 14 BRPM | BODY MASS INDEX: 35.65 KG/M2 | DIASTOLIC BLOOD PRESSURE: 111 MMHG | HEART RATE: 84 BPM | TEMPERATURE: 97.1 F | WEIGHT: 214 LBS | HEIGHT: 65 IN | SYSTOLIC BLOOD PRESSURE: 192 MMHG | OXYGEN SATURATION: 97 %

## 2020-11-12 VITALS — DIASTOLIC BLOOD PRESSURE: 80 MMHG | SYSTOLIC BLOOD PRESSURE: 130 MMHG

## 2020-11-12 PROCEDURE — 99214 OFFICE O/P EST MOD 30 MIN: CPT

## 2020-11-12 RX ORDER — AMOXICILLIN AND CLAVULANATE POTASSIUM 500; 125 MG/1; MG/1
500-125 TABLET, FILM COATED ORAL 3 TIMES DAILY
Qty: 21 | Refills: 0 | Status: DISCONTINUED | COMMUNITY
Start: 2020-10-05 | End: 2020-11-12

## 2020-11-17 DIAGNOSIS — R82.90 UNSPECIFIED ABNORMAL FINDINGS IN URINE: ICD-10-CM

## 2020-11-19 LAB — BACTERIA UR CULT: ABNORMAL

## 2020-11-20 LAB
APPEARANCE: CLEAR
BACTERIA: ABNORMAL
BILIRUBIN URINE: NEGATIVE
BLOOD URINE: NEGATIVE
COLOR: YELLOW
GLUCOSE QUALITATIVE U: NEGATIVE
HYALINE CASTS: 0 /LPF
KETONES URINE: NEGATIVE
LEUKOCYTE ESTERASE URINE: ABNORMAL
MICROSCOPIC-UA: NORMAL
NITRITE URINE: POSITIVE
PH URINE: 7
PROTEIN URINE: NORMAL
RED BLOOD CELLS URINE: 0 /HPF
SPECIFIC GRAVITY URINE: 1.01
SQUAMOUS EPITHELIAL CELLS: 0 /HPF
UROBILINOGEN URINE: NORMAL
WHITE BLOOD CELLS URINE: 20 /HPF

## 2020-12-07 ENCOUNTER — APPOINTMENT (OUTPATIENT)
Dept: UROLOGY | Facility: CLINIC | Age: 80
End: 2020-12-07
Payer: MEDICARE

## 2020-12-07 VITALS
HEART RATE: 68 BPM | SYSTOLIC BLOOD PRESSURE: 165 MMHG | HEIGHT: 65 IN | OXYGEN SATURATION: 94 % | RESPIRATION RATE: 14 BRPM | TEMPERATURE: 96.9 F | DIASTOLIC BLOOD PRESSURE: 77 MMHG

## 2020-12-07 DIAGNOSIS — N30.00 ACUTE CYSTITIS W/OUT HEMATURIA: ICD-10-CM

## 2020-12-07 PROCEDURE — 99204 OFFICE O/P NEW MOD 45 MIN: CPT

## 2020-12-07 NOTE — ASSESSMENT
[FreeTextEntry1] : Very pleasant 79-year-old woman who presents for evaluation of recurrent urinary tract infections\par -Prior labs reviewed\par -Renal ultrasound\par -Discussed general preventative strategies for urinary tract infections\par -We discussed medication options prevention of urinary tract infections, however she would like to hold off on this for now\par -Follow-up in 2 to 3 weeks, after renal ultrasound

## 2020-12-07 NOTE — HISTORY OF PRESENT ILLNESS
[FreeTextEntry1] : Very pleasant 79-year-old woman who presents for evaluation of recurrent urinary tract infections.  She reports recent onset of multiple urinary tract infections.  She reports that she is typically asymptomatic when these occur.  She reports that she is incontinent of urine when she coughs, sneezes, strains and wears a pad.  She changes this 4-5 times per day.  She recently completed a course of antibiotics for urinary tract infection as demonstrated on urine culture from October x2 November x1.  She denies gross hematuria.  No flank pain or suprapubic pain.  No nausea or vomiting.  No specific timing to her symptoms.  No aggravating or alleviating factors that she knows of.  No other complaints. [None] : no symptoms

## 2020-12-11 ENCOUNTER — APPOINTMENT (OUTPATIENT)
Dept: ULTRASOUND IMAGING | Facility: IMAGING CENTER | Age: 80
End: 2020-12-11

## 2020-12-28 ENCOUNTER — APPOINTMENT (OUTPATIENT)
Dept: UROLOGY | Facility: CLINIC | Age: 80
End: 2020-12-28

## 2020-12-29 ENCOUNTER — APPOINTMENT (OUTPATIENT)
Dept: UROLOGY | Facility: CLINIC | Age: 80
End: 2020-12-29

## 2020-12-29 ENCOUNTER — APPOINTMENT (OUTPATIENT)
Dept: UROLOGY | Facility: CLINIC | Age: 80
End: 2020-12-29
Payer: MEDICARE

## 2020-12-29 DIAGNOSIS — R53.83 OTHER FATIGUE: ICD-10-CM

## 2020-12-29 PROCEDURE — 76775 US EXAM ABDO BACK WALL LIM: CPT

## 2020-12-29 PROCEDURE — 99213 OFFICE O/P EST LOW 20 MIN: CPT | Mod: 25

## 2020-12-29 NOTE — HISTORY OF PRESENT ILLNESS
[None] : no symptoms [FreeTextEntry1] : Very pleasant 79-year-old woman who presents for follow-up of recurrent urinary tract infections.  She reports recent onset of multiple urinary tract infections. Most recently she was diagnosed with a urinary tract infection in November. She reports that she is typically asymptomatic when these occur. She does report stress urinary incontinence. She wears a diaper.  She changes this 4-5 times per day.  She recently completed a course of antibiotics for urinary tract infection as demonstrated on urine culture from October x2 and November x1.  She denies gross hematuria.  No flank pain or suprapubic pain.  No nausea or vomiting.  No specific timing to her symptoms.  No aggravating or alleviating factors that she knows of.  No other complaints. Today she underwent a renal ultrasound.

## 2020-12-29 NOTE — ASSESSMENT
[FreeTextEntry1] : Very pleasant 79-year-old woman who presents for follow-up of recurrent urinary tract infections\par -Prior labs reviewed\par -Ultrasound images reviewed with the patient demonstrating no kidney stones, hydronephrosis, renal masses\par -Follow-up in 3 months with urine studies

## 2021-01-04 ENCOUNTER — NON-APPOINTMENT (OUTPATIENT)
Age: 81
End: 2021-01-04

## 2021-01-04 ENCOUNTER — APPOINTMENT (OUTPATIENT)
Dept: CARDIOLOGY | Facility: CLINIC | Age: 81
End: 2021-01-04
Payer: MEDICARE

## 2021-01-04 VITALS — SYSTOLIC BLOOD PRESSURE: 140 MMHG | DIASTOLIC BLOOD PRESSURE: 78 MMHG

## 2021-01-04 VITALS
WEIGHT: 214 LBS | OXYGEN SATURATION: 94 % | HEART RATE: 81 BPM | HEIGHT: 65 IN | SYSTOLIC BLOOD PRESSURE: 170 MMHG | DIASTOLIC BLOOD PRESSURE: 88 MMHG | BODY MASS INDEX: 35.65 KG/M2 | TEMPERATURE: 96.6 F | RESPIRATION RATE: 14 BRPM

## 2021-01-04 PROCEDURE — 99214 OFFICE O/P EST MOD 30 MIN: CPT

## 2021-01-04 NOTE — HISTORY OF PRESENT ILLNESS
[FreeTextEntry1] : Rachel did a lot of house cleaning today emptying out 20 years worth of bills she had piled up. She denies any lightheadedness, dizziness, fatigue or shortness of breath.

## 2021-01-04 NOTE — DISCUSSION/SUMMARY
[___ Month(s)] : [unfilled] month(s) [FreeTextEntry1] : The patient is an 80-year-old obese female history of hypertension, hyperlipidemia,  LVH,  aortic stenosis s/p TAVR., LBBB who continues to do well.\par #1 AS-  s/p TAVR,  no indication at present for PPM. \par #2 Htn- controlled on losartan/HCTZ\par #3 Lipids- therapeutic on atorvastatin\par #4 General- We discussed adherence to a Mediterranean diet, weight loss and at least 30 minutes of daily exercise. \par

## 2021-01-26 ENCOUNTER — RX RENEWAL (OUTPATIENT)
Age: 81
End: 2021-01-26

## 2021-02-16 ENCOUNTER — APPOINTMENT (OUTPATIENT)
Dept: INTERNAL MEDICINE | Facility: CLINIC | Age: 81
End: 2021-02-16
Payer: MEDICARE

## 2021-02-16 ENCOUNTER — LABORATORY RESULT (OUTPATIENT)
Age: 81
End: 2021-02-16

## 2021-02-16 VITALS
SYSTOLIC BLOOD PRESSURE: 158 MMHG | WEIGHT: 215 LBS | BODY MASS INDEX: 35.82 KG/M2 | DIASTOLIC BLOOD PRESSURE: 79 MMHG | HEIGHT: 65 IN | RESPIRATION RATE: 14 BRPM | OXYGEN SATURATION: 93 % | TEMPERATURE: 97.1 F | HEART RATE: 83 BPM

## 2021-02-16 VITALS — SYSTOLIC BLOOD PRESSURE: 130 MMHG | DIASTOLIC BLOOD PRESSURE: 80 MMHG

## 2021-02-16 PROCEDURE — 99214 OFFICE O/P EST MOD 30 MIN: CPT

## 2021-02-16 RX ORDER — AMPICILLIN 500 MG/1
500 CAPSULE ORAL
Qty: 21 | Refills: 0 | Status: DISCONTINUED | COMMUNITY
Start: 2020-11-19 | End: 2021-02-16

## 2021-02-16 NOTE — PHYSICAL EXAM
[Well Nourished] : well nourished [Well Developed] : well developed [Well-Appearing] : well-appearing [Normal Sclera/Conjunctiva] : normal sclera/conjunctiva [PERRL] : pupils equal round and reactive to light [EOMI] : extraocular movements intact [Normal Outer Ear/Nose] : the outer ears and nose were normal in appearance [Normal Oropharynx] : the oropharynx was normal [No JVD] : no jugular venous distention [No Lymphadenopathy] : no lymphadenopathy [Supple] : supple [Thyroid Normal, No Nodules] : the thyroid was normal and there were no nodules present [No Respiratory Distress] : no respiratory distress  [No Accessory Muscle Use] : no accessory muscle use [Clear to Auscultation] : lungs were clear to auscultation bilaterally [Normal Rate] : normal rate  [Regular Rhythm] : with a regular rhythm [Normal S1, S2] : normal S1 and S2 [No Murmur] : no murmur heard [No Carotid Bruits] : no carotid bruits [No Abdominal Bruit] : a ~M bruit was not heard ~T in the abdomen [No Varicosities] : no varicosities [Pedal Pulses Present] : the pedal pulses are present [No Edema] : there was no peripheral edema [No Palpable Aorta] : no palpable aorta [No Extremity Clubbing/Cyanosis] : no extremity clubbing/cyanosis [Soft] : abdomen soft [Non Tender] : non-tender [Non-distended] : non-distended [No Masses] : no abdominal mass palpated [No HSM] : no HSM [Normal Bowel Sounds] : normal bowel sounds [Normal Posterior Cervical Nodes] : no posterior cervical lymphadenopathy [No CVA Tenderness] : no CVA  tenderness [Normal Anterior Cervical Nodes] : no anterior cervical lymphadenopathy [No Spinal Tenderness] : no spinal tenderness [No Joint Swelling] : no joint swelling [Grossly Normal Strength/Tone] : grossly normal strength/tone [No Rash] : no rash [Coordination Grossly Intact] : coordination grossly intact [No Focal Deficits] : no focal deficits [Normal Gait] : normal gait [Deep Tendon Reflexes (DTR)] : deep tendon reflexes were 2+ and symmetric [Normal Insight/Judgement] : insight and judgment were intact [Normal Affect] : the affect was normal

## 2021-02-16 NOTE — COUNSELING
[Potential consequences of obesity discussed] : Potential consequences of obesity discussed [Benefits of weight loss discussed] : Benefits of weight loss discussed [Structured Weight Management Program suggested:] : Structured weight management program suggested [Encouraged to maintain food diary] : Encouraged to maintain food diary [Encouraged to increase physical activity] : Encouraged to increase physical activity [Encouraged to use exercise tracking device] : Encouraged to use exercise tracking device [Target Wt Loss Goal ___] : Weight Loss Goals: Target weight loss goal [unfilled] lbs [Weigh Self Weekly] : weigh self weekly [Decrease Portions] : decrease portions [____ min/wk Activity] : [unfilled] min/wk activity [Keep Food Diary] : keep food diary [None] : None [Good understanding] : Patient has a good understanding of lifestyle changes and steps needed to achieve self management goal [FreeTextEntry1] : SHWETA

## 2021-02-23 ENCOUNTER — LABORATORY RESULT (OUTPATIENT)
Age: 81
End: 2021-02-23

## 2021-02-24 LAB
25(OH)D3 SERPL-MCNC: 63.9 NG/ML
ALBUMIN SERPL ELPH-MCNC: 4.4 G/DL
ALP BLD-CCNC: 98 U/L
ALT SERPL-CCNC: 16 U/L
ANION GAP SERPL CALC-SCNC: 13 MMOL/L
APPEARANCE: ABNORMAL
AST SERPL-CCNC: 20 U/L
BASOPHILS # BLD AUTO: 0.05 K/UL
BASOPHILS NFR BLD AUTO: 0.9 %
BILIRUB SERPL-MCNC: 0.5 MG/DL
BILIRUBIN URINE: NEGATIVE
BLOOD URINE: NEGATIVE
BUN SERPL-MCNC: 24 MG/DL
CALCIUM SERPL-MCNC: 10.1 MG/DL
CHLORIDE SERPL-SCNC: 105 MMOL/L
CO2 SERPL-SCNC: 27 MMOL/L
COLOR: YELLOW
CREAT SERPL-MCNC: 1.19 MG/DL
CREAT SPEC-SCNC: 166 MG/DL
EOSINOPHIL # BLD AUTO: 0.32 K/UL
EOSINOPHIL NFR BLD AUTO: 5.7 %
ESTIMATED AVERAGE GLUCOSE: 108 MG/DL
FERRITIN SERPL-MCNC: 303 NG/ML
FOLATE SERPL-MCNC: >20 NG/ML
GLUCOSE QUALITATIVE U: NEGATIVE
GLUCOSE SERPL-MCNC: 106 MG/DL
GLUCOSE SERPL-MCNC: 106 MG/DL
HBA1C MFR BLD HPLC: 5.4 %
HCT VFR BLD CALC: 41.9 %
HGB BLD-MCNC: 13.8 G/DL
IMM GRANULOCYTES NFR BLD AUTO: 0.4 %
IRON SERPL-MCNC: 70 UG/DL
KETONES URINE: NEGATIVE
LEUKOCYTE ESTERASE URINE: ABNORMAL
LYMPHOCYTES # BLD AUTO: 1.57 K/UL
LYMPHOCYTES NFR BLD AUTO: 27.8 %
MAN DIFF?: NORMAL
MCHC RBC-ENTMCNC: 29.9 PG
MCHC RBC-ENTMCNC: 32.9 GM/DL
MCV RBC AUTO: 90.9 FL
MICROALBUMIN 24H UR DL<=1MG/L-MCNC: 11.2 MG/DL
MICROALBUMIN/CREAT 24H UR-RTO: 67 MG/G
MONOCYTES # BLD AUTO: 0.41 K/UL
MONOCYTES NFR BLD AUTO: 7.3 %
NEUTROPHILS # BLD AUTO: 3.27 K/UL
NEUTROPHILS NFR BLD AUTO: 57.9 %
NITRITE URINE: POSITIVE
PH URINE: 6
PLATELET # BLD AUTO: 138 K/UL
POTASSIUM SERPL-SCNC: 4 MMOL/L
PROT SERPL-MCNC: 7 G/DL
PROTEIN URINE: ABNORMAL
RBC # BLD: 4.61 M/UL
RBC # FLD: 12.9 %
SODIUM SERPL-SCNC: 145 MMOL/L
SPECIFIC GRAVITY URINE: 1.03
TSH SERPL-ACNC: 1.04 UIU/ML
UROBILINOGEN URINE: NORMAL
VIT B12 SERPL-MCNC: 1181 PG/ML
WBC # FLD AUTO: 5.64 K/UL

## 2021-03-03 ENCOUNTER — APPOINTMENT (OUTPATIENT)
Dept: CARDIOLOGY | Facility: CLINIC | Age: 81
End: 2021-03-03
Payer: MEDICARE

## 2021-03-03 ENCOUNTER — NON-APPOINTMENT (OUTPATIENT)
Age: 81
End: 2021-03-03

## 2021-03-03 VITALS
WEIGHT: 215 LBS | TEMPERATURE: 97 F | DIASTOLIC BLOOD PRESSURE: 80 MMHG | BODY MASS INDEX: 35.82 KG/M2 | HEART RATE: 70 BPM | HEIGHT: 65 IN | RESPIRATION RATE: 14 BRPM | SYSTOLIC BLOOD PRESSURE: 130 MMHG | OXYGEN SATURATION: 94 %

## 2021-03-03 PROCEDURE — 99214 OFFICE O/P EST MOD 30 MIN: CPT

## 2021-03-03 RX ORDER — CLOPIDOGREL BISULFATE 75 MG/1
75 TABLET, FILM COATED ORAL DAILY
Qty: 90 | Refills: 3 | Status: DISCONTINUED | COMMUNITY
Start: 2020-09-09 | End: 2021-03-03

## 2021-03-03 NOTE — DISCUSSION/SUMMARY
[___ Month(s)] : [unfilled] month(s) [FreeTextEntry1] : The patient is an 80-year-old obese female history of hypertension, hyperlipidemia,  LVH,  aortic stenosis s/p TAVR., LBBB who continues to do well.\par #1 AS-  s/p TAVR,  no indication at present for PPM. \par #2 Htn- continue losartan/HCTZ\par #3 Lipids- therapeutic on atorvastatin\par #4 General- We discussed adherence to a Mediterranean diet, weight loss and at least 30 minutes of daily exercise. COVID Vaccinated.\par

## 2021-03-03 NOTE — HISTORY OF PRESENT ILLNESS
[FreeTextEntry1] : Rachel received first COVID vaccine. She overall feels great. She denies any lightheadedness, dizziness, fatigue or shortness of breath.

## 2021-03-08 LAB
CHOLEST SERPL-MCNC: 159 MG/DL
HDLC SERPL-MCNC: 46 MG/DL
LDLC SERPL CALC-MCNC: 86 MG/DL
NONHDLC SERPL-MCNC: 113 MG/DL
TRIGL SERPL-MCNC: 135 MG/DL

## 2021-03-24 PROCEDURE — 93228 REMOTE 30 DAY ECG REV/REPORT: CPT

## 2021-03-29 ENCOUNTER — APPOINTMENT (OUTPATIENT)
Dept: UROLOGY | Facility: CLINIC | Age: 81
End: 2021-03-29
Payer: MEDICARE

## 2021-03-29 VITALS
OXYGEN SATURATION: 93 % | TEMPERATURE: 96.9 F | SYSTOLIC BLOOD PRESSURE: 169 MMHG | HEART RATE: 78 BPM | BODY MASS INDEX: 34.99 KG/M2 | DIASTOLIC BLOOD PRESSURE: 82 MMHG | RESPIRATION RATE: 14 BRPM | WEIGHT: 210 LBS | HEIGHT: 65 IN

## 2021-03-29 PROCEDURE — 99214 OFFICE O/P EST MOD 30 MIN: CPT

## 2021-03-29 NOTE — ASSESSMENT
[FreeTextEntry1] : Very pleasant 80-year-old woman who presents for follow-up of recurrent urinary tract infections\par -Labs reviewed–urinalysis demonstrates numerous bacteria\par -Urine culture demonstrates a urinary tract infection\par -Start Azo cranberry\par -We again discussed general preventative strategies for urinary tract infections\par -We discussed medication options used for prophylaxis of urinary tract infections, however at this time she would like to defer additional medications\par -Follow-up with urine culture in 2 months

## 2021-03-29 NOTE — HISTORY OF PRESENT ILLNESS
[FreeTextEntry1] : Very pleasant 80-year-old woman who presents for follow-up of recurrent urinary tract infections.  She was diagnosed with another urinary tract infection in February and was prescribed Cipro.  She reports no dysuria.  She does report slight increase in incontinence when she has a urinary tract infection.  She currently feels well.  She has not tried prophylaxis for urinary tract infections yet.

## 2021-04-19 ENCOUNTER — NON-APPOINTMENT (OUTPATIENT)
Age: 81
End: 2021-04-19

## 2021-04-19 ENCOUNTER — APPOINTMENT (OUTPATIENT)
Dept: CARDIOLOGY | Facility: CLINIC | Age: 81
End: 2021-04-19
Payer: MEDICARE

## 2021-04-19 VITALS
BODY MASS INDEX: 35.32 KG/M2 | HEART RATE: 91 BPM | WEIGHT: 212 LBS | OXYGEN SATURATION: 93 % | DIASTOLIC BLOOD PRESSURE: 75 MMHG | TEMPERATURE: 96.9 F | SYSTOLIC BLOOD PRESSURE: 145 MMHG | HEIGHT: 65 IN

## 2021-04-19 DIAGNOSIS — I49.3 VENTRICULAR PREMATURE DEPOLARIZATION: ICD-10-CM

## 2021-04-19 PROCEDURE — 93000 ELECTROCARDIOGRAM COMPLETE: CPT

## 2021-04-19 PROCEDURE — 99214 OFFICE O/P EST MOD 30 MIN: CPT

## 2021-04-19 NOTE — HISTORY OF PRESENT ILLNESS
[FreeTextEntry1] : Rachel overall feels great. She denies any lightheadedness, dizziness, fatigue or shortness of breath.

## 2021-04-19 NOTE — DISCUSSION/SUMMARY
[___ Month(s)] : [unfilled] month(s) [FreeTextEntry1] : The patient is an 80-year-old obese female history of hypertension, hyperlipidemia,  LVH,  aortic stenosis s/p TAVR., LBBB with VPC in trigeminal pattern that are new.\par #1 AS-  s/p TAVR,  no indication at present for PPM, holter today, check labs including urine looking for etiology\par #2 Htn- continue losartan/HCTZ\par #3 Lipids- therapeutic, continue atorvastatin\par #4 General- We discussed adherence to a Mediterranean diet, weight loss and at least 30 minutes of daily exercise. COVID Vaccinated.\par

## 2021-04-20 ENCOUNTER — LABORATORY RESULT (OUTPATIENT)
Age: 81
End: 2021-04-20

## 2021-04-20 LAB
ALBUMIN SERPL ELPH-MCNC: 4.1 G/DL
ALP BLD-CCNC: 82 U/L
ALT SERPL-CCNC: 14 U/L
ANION GAP SERPL CALC-SCNC: 10 MMOL/L
AST SERPL-CCNC: 25 U/L
BASOPHILS # BLD AUTO: 0.06 K/UL
BASOPHILS NFR BLD AUTO: 1 %
BILIRUB SERPL-MCNC: 0.5 MG/DL
BUN SERPL-MCNC: 26 MG/DL
CALCIUM SERPL-MCNC: 10 MG/DL
CHLORIDE SERPL-SCNC: 105 MMOL/L
CO2 SERPL-SCNC: 27 MMOL/L
CREAT SERPL-MCNC: 1.19 MG/DL
EOSINOPHIL # BLD AUTO: 0.13 K/UL
EOSINOPHIL NFR BLD AUTO: 2.2 %
GLUCOSE SERPL-MCNC: 117 MG/DL
HCT VFR BLD CALC: 41.8 %
HGB BLD-MCNC: 13.6 G/DL
IMM GRANULOCYTES NFR BLD AUTO: 0.3 %
LYMPHOCYTES # BLD AUTO: 1.71 K/UL
LYMPHOCYTES NFR BLD AUTO: 29.4 %
MAN DIFF?: NORMAL
MCHC RBC-ENTMCNC: 30.2 PG
MCHC RBC-ENTMCNC: 32.5 GM/DL
MCV RBC AUTO: 92.9 FL
MONOCYTES # BLD AUTO: 0.41 K/UL
MONOCYTES NFR BLD AUTO: 7 %
NEUTROPHILS # BLD AUTO: 3.49 K/UL
NEUTROPHILS NFR BLD AUTO: 60.1 %
PLATELET # BLD AUTO: 134 K/UL
POTASSIUM SERPL-SCNC: 4.4 MMOL/L
PROT SERPL-MCNC: 7.1 G/DL
RBC # BLD: 4.5 M/UL
RBC # FLD: 13.5 %
SODIUM SERPL-SCNC: 142 MMOL/L
WBC # FLD AUTO: 5.82 K/UL

## 2021-04-21 LAB
APPEARANCE: CLEAR
BILIRUBIN URINE: NEGATIVE
BLOOD URINE: NEGATIVE
COLOR: YELLOW
GLUCOSE QUALITATIVE U: NEGATIVE
KETONES URINE: NEGATIVE
LEUKOCYTE ESTERASE URINE: NEGATIVE
NITRITE URINE: NEGATIVE
PH URINE: 7.5
PROTEIN URINE: ABNORMAL
SPECIFIC GRAVITY URINE: 1.03
UROBILINOGEN URINE: NORMAL

## 2021-06-07 ENCOUNTER — APPOINTMENT (OUTPATIENT)
Dept: UROLOGY | Facility: CLINIC | Age: 81
End: 2021-06-07
Payer: MEDICARE

## 2021-06-07 VITALS
SYSTOLIC BLOOD PRESSURE: 147 MMHG | OXYGEN SATURATION: 98 % | TEMPERATURE: 97.1 F | HEART RATE: 83 BPM | DIASTOLIC BLOOD PRESSURE: 68 MMHG | HEIGHT: 65 IN | RESPIRATION RATE: 12 BRPM

## 2021-06-07 DIAGNOSIS — I50.9 HEART FAILURE, UNSPECIFIED: ICD-10-CM

## 2021-06-07 PROCEDURE — 99213 OFFICE O/P EST LOW 20 MIN: CPT

## 2021-06-07 NOTE — HISTORY OF PRESENT ILLNESS
[FreeTextEntry1] : Very pleasant 80-year-old woman who presents for follow-up of recurrent urinary tract infections.  She was last diagnosed with a urinary tract infection in February and was prescribed Cipro.  She reports no dysuria at this time.  She continues to report incontinence when she has a urinary tract infection.  She currently feels well.   She is taking cranberry pills currently.

## 2021-06-07 NOTE — ASSESSMENT
[FreeTextEntry1] : Very pleasant 80-year-old woman who presents for follow-up of recurrent urinary tract infections\par -Prior labs reviewed including urinalysis\par -Urine culture reviewed\par -Continue Azo cranberry\par -We again discussed general preventative strategies for urinary tract infections\par -F/U in 6 months

## 2021-07-26 ENCOUNTER — RX RENEWAL (OUTPATIENT)
Age: 81
End: 2021-07-26

## 2021-08-21 ENCOUNTER — RX RENEWAL (OUTPATIENT)
Age: 81
End: 2021-08-21

## 2021-09-09 ENCOUNTER — NON-APPOINTMENT (OUTPATIENT)
Age: 81
End: 2021-09-09

## 2021-09-09 ENCOUNTER — APPOINTMENT (OUTPATIENT)
Dept: INTERNAL MEDICINE | Facility: CLINIC | Age: 81
End: 2021-09-09
Payer: MEDICARE

## 2021-09-09 ENCOUNTER — APPOINTMENT (OUTPATIENT)
Dept: CARDIOLOGY | Facility: CLINIC | Age: 81
End: 2021-09-09
Payer: MEDICARE

## 2021-09-09 VITALS
DIASTOLIC BLOOD PRESSURE: 80 MMHG | BODY MASS INDEX: 35.32 KG/M2 | TEMPERATURE: 96.9 F | OXYGEN SATURATION: 95 % | WEIGHT: 212 LBS | HEART RATE: 84 BPM | HEIGHT: 65 IN | RESPIRATION RATE: 12 BRPM | SYSTOLIC BLOOD PRESSURE: 151 MMHG

## 2021-09-09 VITALS — DIASTOLIC BLOOD PRESSURE: 80 MMHG | SYSTOLIC BLOOD PRESSURE: 130 MMHG

## 2021-09-09 DIAGNOSIS — Z23 ENCOUNTER FOR IMMUNIZATION: ICD-10-CM

## 2021-09-09 PROCEDURE — G0008: CPT

## 2021-09-09 PROCEDURE — 90662 IIV NO PRSV INCREASED AG IM: CPT

## 2021-09-09 PROCEDURE — 99214 OFFICE O/P EST MOD 30 MIN: CPT

## 2021-09-09 PROCEDURE — 93000 ELECTROCARDIOGRAM COMPLETE: CPT

## 2021-09-09 PROCEDURE — 99214 OFFICE O/P EST MOD 30 MIN: CPT | Mod: 25

## 2021-09-09 RX ORDER — CIPROFLOXACIN HYDROCHLORIDE 500 MG/1
500 TABLET, FILM COATED ORAL
Qty: 10 | Refills: 0 | Status: DISCONTINUED | COMMUNITY
Start: 2021-02-26 | End: 2021-09-09

## 2021-09-09 NOTE — DISCUSSION/SUMMARY
[___ Month(s)] : in [unfilled] month(s) [FreeTextEntry1] : The patient is an 80-year-old obese female history of hypertension, hyperlipidemia,  LVH,  aortic stenosis s/p TAVR., LBBB with VPC that have improved\par #1 AS-  s/p TAVR,  no indication at present for PPM, PVC controlled with toprol\par #2 Htn- continue losartan/HCTZ\par #3 Lipids- therapeutic, continue atorvastatin\par #4 General- We discussed adherence to a Mediterranean diet, weight loss and at least 30 minutes of daily exercise. COVID Vaccinated.\par

## 2021-09-09 NOTE — COUNSELING
[Potential consequences of obesity discussed] : Potential consequences of obesity discussed [Benefits of weight loss discussed] : Benefits of weight loss discussed [Structured Weight Management Program suggested:] : Structured weight management program suggested [Encouraged to maintain food diary] : Encouraged to maintain food diary [Encouraged to increase physical activity] : Encouraged to increase physical activity [Weigh Self Weekly] : weigh self weekly [Decrease Portions] : decrease portions [____ min/wk Activity] : [unfilled] min/wk activity [Keep Food Diary] : keep food diary [None] : None [Good understanding] : Patient has a good understanding of lifestyle changes and steps needed to achieve self management goal [FreeTextEntry1] : SHWETA

## 2021-09-09 NOTE — HISTORY OF PRESENT ILLNESS
[FreeTextEntry1] : Rachel has been feeling well with no chest pain, palpitations or shortness of breath.

## 2021-10-07 ENCOUNTER — NON-APPOINTMENT (OUTPATIENT)
Age: 81
End: 2021-10-07

## 2021-12-13 ENCOUNTER — APPOINTMENT (OUTPATIENT)
Dept: UROLOGY | Facility: CLINIC | Age: 81
End: 2021-12-13
Payer: MEDICARE

## 2021-12-13 VITALS
OXYGEN SATURATION: 96 % | TEMPERATURE: 96.3 F | SYSTOLIC BLOOD PRESSURE: 169 MMHG | HEIGHT: 65 IN | WEIGHT: 208 LBS | HEART RATE: 82 BPM | BODY MASS INDEX: 34.66 KG/M2 | DIASTOLIC BLOOD PRESSURE: 112 MMHG | RESPIRATION RATE: 12 BRPM

## 2021-12-13 PROCEDURE — 99213 OFFICE O/P EST LOW 20 MIN: CPT

## 2021-12-13 NOTE — ASSESSMENT
[FreeTextEntry1] : Very pleasant 80-year-old woman who presents for follow-up of recurrent urinary tract infections, urinary incontinence\par -Patient reports that the incontinence only happens when she has a urinary tract infection\par -Prior labs reviewed including urinalysis from 4/2021 demonstrating no microscopic hematuria or evidence of a urinary tract infection\par -Urine culture reviewed\par -Continue Azo cranberry\par -We again discussed general preventative strategies for urinary tract infections\par -F/U in 6 months.

## 2021-12-13 NOTE — HISTORY OF PRESENT ILLNESS
[FreeTextEntry1] : Very pleasant 80-year-old woman who presents for follow-up of recurrent urinary tract infections.  She was last diagnosed with a urinary tract infection in February 2021 and was prescribed Cipro.  She reports that she feels she reports no dysuria at this time.  She continues to report incontinence when she has a urinary tract infection.  She currently feels well.   She is taking cranberry pills currently as advised.

## 2022-03-10 ENCOUNTER — APPOINTMENT (OUTPATIENT)
Dept: INTERNAL MEDICINE | Facility: CLINIC | Age: 82
End: 2022-03-10
Payer: MEDICARE

## 2022-03-10 ENCOUNTER — APPOINTMENT (OUTPATIENT)
Dept: CARDIOLOGY | Facility: CLINIC | Age: 82
End: 2022-03-10
Payer: MEDICARE

## 2022-03-10 ENCOUNTER — NON-APPOINTMENT (OUTPATIENT)
Age: 82
End: 2022-03-10

## 2022-03-10 VITALS
RESPIRATION RATE: 12 BRPM | OXYGEN SATURATION: 97 % | TEMPERATURE: 97.2 F | DIASTOLIC BLOOD PRESSURE: 97 MMHG | SYSTOLIC BLOOD PRESSURE: 194 MMHG | HEART RATE: 76 BPM

## 2022-03-10 VITALS — BODY MASS INDEX: 35.28 KG/M2 | WEIGHT: 212 LBS | RESPIRATION RATE: 12 BRPM

## 2022-03-10 VITALS — SYSTOLIC BLOOD PRESSURE: 136 MMHG | DIASTOLIC BLOOD PRESSURE: 80 MMHG

## 2022-03-10 DIAGNOSIS — C50.911 MALIGNANT NEOPLASM OF UNSPECIFIED SITE OF RIGHT FEMALE BREAST: ICD-10-CM

## 2022-03-10 PROCEDURE — 93000 ELECTROCARDIOGRAM COMPLETE: CPT

## 2022-03-10 PROCEDURE — 99214 OFFICE O/P EST MOD 30 MIN: CPT

## 2022-03-10 PROCEDURE — 99397 PER PM REEVAL EST PAT 65+ YR: CPT | Mod: GY

## 2022-03-10 RX ORDER — ACETAMINOPHEN 325 MG/1
325 TABLET ORAL EVERY 6 HOURS
Refills: 0 | Status: DISCONTINUED | COMMUNITY
End: 2022-03-10

## 2022-03-10 NOTE — PHYSICAL EXAM
[No Acute Distress] : no acute distress [Well Nourished] : well nourished [Well Developed] : well developed [Well-Appearing] : well-appearing [Normal Sclera/Conjunctiva] : normal sclera/conjunctiva [PERRL] : pupils equal round and reactive to light [EOMI] : extraocular movements intact [No JVD] : no jugular venous distention [No Lymphadenopathy] : no lymphadenopathy [Supple] : supple [Thyroid Normal, No Nodules] : the thyroid was normal and there were no nodules present [No Respiratory Distress] : no respiratory distress  [No Accessory Muscle Use] : no accessory muscle use [Clear to Auscultation] : lungs were clear to auscultation bilaterally [Normal Rate] : normal rate  [Regular Rhythm] : with a regular rhythm [Normal S1, S2] : normal S1 and S2 [No Carotid Bruits] : no carotid bruits [No Abdominal Bruit] : a ~M bruit was not heard ~T in the abdomen [No Varicosities] : no varicosities [Pedal Pulses Present] : the pedal pulses are present [No Edema] : there was no peripheral edema [No Palpable Aorta] : no palpable aorta [No Extremity Clubbing/Cyanosis] : no extremity clubbing/cyanosis [___ +] : bilateral [unfilled]U+ pitting edema to the ankles [Soft] : abdomen soft [Non Tender] : non-tender [Non-distended] : non-distended [No Masses] : no abdominal mass palpated [Normal Bowel Sounds] : normal bowel sounds [Obese] : obese [Normal] : normal [Soft, Nontender] : the abdomen was soft and nontender [No Mass] : no masses were palpated [No HSM] : no hepatosplenomegaly noted [Normal Posterior Cervical Nodes] : no posterior cervical lymphadenopathy [No CVA Tenderness] : no CVA  tenderness [Normal Anterior Cervical Nodes] : no anterior cervical lymphadenopathy [No Spinal Tenderness] : no spinal tenderness [No Joint Swelling] : no joint swelling [Grossly Normal Strength/Tone] : grossly normal strength/tone [No Rash] : no rash [Coordination Grossly Intact] : coordination grossly intact [No Focal Deficits] : no focal deficits [Normal Affect] : the affect was normal [Normal Insight/Judgement] : insight and judgment were intact [de-identified] : 2/6 RUSB

## 2022-03-10 NOTE — HISTORY OF PRESENT ILLNESS
[de-identified] : PT COMES FOR CPE \par HAD LUMPECTOMY 1979 ,SINCE THEN "NORMAL MAMMO" \par OFFERS NO NEW COMPLAINS

## 2022-03-10 NOTE — ASSESSMENT
[FreeTextEntry1] : CPE OF 81 Y OLD FEM WITH PMX OF HTN ,AS S/P TAVR ,R BREAST CA 1979,IFG ,DYSLIPIDEMIA AND OBESITY = LABS TODAY EKG BY CARDIOLOGY \par RTO 4 M OR PRN

## 2022-03-11 LAB
25(OH)D3 SERPL-MCNC: 84.4 NG/ML
ALBUMIN SERPL ELPH-MCNC: 4.2 G/DL
ALP BLD-CCNC: 95 U/L
ALT SERPL-CCNC: 12 U/L
ANION GAP SERPL CALC-SCNC: 12 MMOL/L
AST SERPL-CCNC: 20 U/L
BASOPHILS # BLD AUTO: 0.05 K/UL
BASOPHILS NFR BLD AUTO: 1 %
BILIRUB SERPL-MCNC: 0.6 MG/DL
BUN SERPL-MCNC: 27 MG/DL
CALCIUM SERPL-MCNC: 10 MG/DL
CHLORIDE SERPL-SCNC: 106 MMOL/L
CHOLEST SERPL-MCNC: 157 MG/DL
CO2 SERPL-SCNC: 25 MMOL/L
CREAT SERPL-MCNC: 1.27 MG/DL
EGFR: 42 ML/MIN/1.73M2
EOSINOPHIL # BLD AUTO: 0.14 K/UL
EOSINOPHIL NFR BLD AUTO: 2.7 %
ESTIMATED AVERAGE GLUCOSE: 103 MG/DL
GLUCOSE SERPL-MCNC: 114 MG/DL
HBA1C MFR BLD HPLC: 5.2 %
HCT VFR BLD CALC: 40.8 %
HDLC SERPL-MCNC: 47 MG/DL
HGB BLD-MCNC: 13.4 G/DL
IMM GRANULOCYTES NFR BLD AUTO: 0.2 %
LDLC SERPL CALC-MCNC: 81 MG/DL
LYMPHOCYTES # BLD AUTO: 1.2 K/UL
LYMPHOCYTES NFR BLD AUTO: 23.5 %
MAN DIFF?: NORMAL
MCHC RBC-ENTMCNC: 30.5 PG
MCHC RBC-ENTMCNC: 32.8 GM/DL
MCV RBC AUTO: 92.9 FL
MONOCYTES # BLD AUTO: 0.35 K/UL
MONOCYTES NFR BLD AUTO: 6.9 %
NEUTROPHILS # BLD AUTO: 3.35 K/UL
NEUTROPHILS NFR BLD AUTO: 65.7 %
NONHDLC SERPL-MCNC: 109 MG/DL
PLATELET # BLD AUTO: 141 K/UL
POTASSIUM SERPL-SCNC: 4.6 MMOL/L
PROT SERPL-MCNC: 7 G/DL
RBC # BLD: 4.39 M/UL
RBC # FLD: 12.7 %
SODIUM SERPL-SCNC: 143 MMOL/L
TRIGL SERPL-MCNC: 145 MG/DL
WBC # FLD AUTO: 5.1 K/UL

## 2022-03-12 NOTE — DISCUSSION/SUMMARY
[FreeTextEntry1] : The patient is an 81-year-old obese female history of hypertension, hyperlipidemia,  LVH,  aortic stenosis s/p TAVR., LBBB with VPCs \par #1 AS-  s/p TAVR,  no indication at present for PPM, event monitor for increase in PVC , continue toprol\par #2 Htn- continue losartan/HCTZ\par #3 Lipids- therapeutic, continue atorvastatin, check labs today\par #4 General- We discussed adherence to a Mediterranean diet, weight loss and at least 30 minutes of daily exercise. COVID Vaccinated and booster.\par

## 2022-06-13 ENCOUNTER — APPOINTMENT (OUTPATIENT)
Dept: UROLOGY | Facility: CLINIC | Age: 82
End: 2022-06-13
Payer: MEDICARE

## 2022-06-13 VITALS
SYSTOLIC BLOOD PRESSURE: 160 MMHG | DIASTOLIC BLOOD PRESSURE: 79 MMHG | BODY MASS INDEX: 35.49 KG/M2 | HEIGHT: 65 IN | WEIGHT: 213 LBS | HEART RATE: 74 BPM | RESPIRATION RATE: 12 BRPM | TEMPERATURE: 96.9 F | OXYGEN SATURATION: 95 %

## 2022-06-13 DIAGNOSIS — Z87.440 PERSONAL HISTORY OF URINARY (TRACT) INFECTIONS: ICD-10-CM

## 2022-06-13 PROCEDURE — 99213 OFFICE O/P EST LOW 20 MIN: CPT

## 2022-06-13 NOTE — ASSESSMENT
[FreeTextEntry1] : Very pleasant 81-year-old woman presents with recurrent urinary tract\par -She denies dysuria at this time\par -Continue cranberry supplements\par -Creatinine 1.27\par -Hemoglobin A1c 5.2%\par -WBC 5.1

## 2022-06-13 NOTE — HISTORY OF PRESENT ILLNESS
[FreeTextEntry1] : Very pleasant 81-year-old woman who presents for follow-up of recurrent urinary tract infections.  Most recent urinary tract infection was in February 2021 and was treated with Cipro.  She denies dysuria at this time.   She currently feels well.   She is taking cranberry pills currently as advised.

## 2022-07-14 ENCOUNTER — APPOINTMENT (OUTPATIENT)
Dept: CARDIOLOGY | Facility: CLINIC | Age: 82
End: 2022-07-14

## 2022-07-14 ENCOUNTER — APPOINTMENT (OUTPATIENT)
Dept: INTERNAL MEDICINE | Facility: CLINIC | Age: 82
End: 2022-07-14

## 2022-07-14 ENCOUNTER — NON-APPOINTMENT (OUTPATIENT)
Age: 82
End: 2022-07-14

## 2022-07-14 VITALS
HEART RATE: 87 BPM | SYSTOLIC BLOOD PRESSURE: 130 MMHG | OXYGEN SATURATION: 97 % | DIASTOLIC BLOOD PRESSURE: 76 MMHG | RESPIRATION RATE: 12 BRPM | WEIGHT: 210 LBS | SYSTOLIC BLOOD PRESSURE: 150 MMHG | HEIGHT: 65 IN | BODY MASS INDEX: 34.99 KG/M2 | DIASTOLIC BLOOD PRESSURE: 77 MMHG

## 2022-07-14 PROCEDURE — 93000 ELECTROCARDIOGRAM COMPLETE: CPT

## 2022-07-14 PROCEDURE — 99214 OFFICE O/P EST MOD 30 MIN: CPT

## 2022-07-14 RX ORDER — LOSARTAN POTASSIUM 100 MG/1
100 TABLET, FILM COATED ORAL DAILY
Qty: 90 | Refills: 3 | Status: DISCONTINUED | COMMUNITY
Start: 2020-08-15 | End: 2022-07-14

## 2022-07-14 RX ORDER — HYDROCHLOROTHIAZIDE 12.5 MG/1
12.5 CAPSULE ORAL
Qty: 90 | Refills: 3 | Status: DISCONTINUED | COMMUNITY
Start: 2020-08-15 | End: 2022-07-14

## 2022-07-14 NOTE — PHYSICAL EXAM
[No Acute Distress] : no acute distress [Well Nourished] : well nourished [Well Developed] : well developed [Well-Appearing] : well-appearing [Normal Sclera/Conjunctiva] : normal sclera/conjunctiva [PERRL] : pupils equal round and reactive to light [EOMI] : extraocular movements intact [No JVD] : no jugular venous distention [No Lymphadenopathy] : no lymphadenopathy [Supple] : supple [Thyroid Normal, No Nodules] : the thyroid was normal and there were no nodules present [No Respiratory Distress] : no respiratory distress  [No Accessory Muscle Use] : no accessory muscle use [Clear to Auscultation] : lungs were clear to auscultation bilaterally [Normal Rate] : normal rate  [Regular Rhythm] : with a regular rhythm [Normal S1, S2] : normal S1 and S2 [No Carotid Bruits] : no carotid bruits [No Abdominal Bruit] : a ~M bruit was not heard ~T in the abdomen [No Varicosities] : no varicosities [Pedal Pulses Present] : the pedal pulses are present [No Edema] : there was no peripheral edema [No Palpable Aorta] : no palpable aorta [No Extremity Clubbing/Cyanosis] : no extremity clubbing/cyanosis [Soft] : abdomen soft [Non Tender] : non-tender [Non-distended] : non-distended [No Masses] : no abdominal mass palpated [No HSM] : no HSM [Normal Bowel Sounds] : normal bowel sounds [Obese] : obese [Normal Posterior Cervical Nodes] : no posterior cervical lymphadenopathy [Normal Anterior Cervical Nodes] : no anterior cervical lymphadenopathy [No CVA Tenderness] : no CVA  tenderness [No Spinal Tenderness] : no spinal tenderness [No Joint Swelling] : no joint swelling [Grossly Normal Strength/Tone] : grossly normal strength/tone [No Rash] : no rash [Coordination Grossly Intact] : coordination grossly intact [No Focal Deficits] : no focal deficits [Normal Affect] : the affect was normal [Normal Insight/Judgement] : insight and judgment were intact [de-identified] : ANALI 2/6 RUSB

## 2022-07-14 NOTE — HISTORY OF PRESENT ILLNESS
[FreeTextEntry1] : Rachel has been feeling well and doing all her normal routine. Uses stationary pedals. No CP, palpitations or SOB.

## 2022-07-14 NOTE — ASSESSMENT
[FreeTextEntry1] : 81 Y OLD FEM WITH PMX OF HTN ,DYSLIPIDEMIA ,AS S/P TAVR AND OBESITY = LABS AND RTO 4 M \par RECOMM 4TH COVID-19 DOSE

## 2022-07-15 LAB
ALBUMIN SERPL ELPH-MCNC: 4.3 G/DL
ALP BLD-CCNC: 111 U/L
ALT SERPL-CCNC: 17 U/L
ANION GAP SERPL CALC-SCNC: 13 MMOL/L
AST SERPL-CCNC: 38 U/L
BILIRUB SERPL-MCNC: 0.5 MG/DL
BUN SERPL-MCNC: 25 MG/DL
CALCIUM SERPL-MCNC: 9.9 MG/DL
CHLORIDE SERPL-SCNC: 105 MMOL/L
CHOLEST SERPL-MCNC: 163 MG/DL
CO2 SERPL-SCNC: 24 MMOL/L
CREAT SERPL-MCNC: 1.25 MG/DL
EGFR: 43 ML/MIN/1.73M2
GLUCOSE SERPL-MCNC: 121 MG/DL
HDLC SERPL-MCNC: 44 MG/DL
LDLC SERPL CALC-MCNC: 83 MG/DL
NONHDLC SERPL-MCNC: 118 MG/DL
POTASSIUM SERPL-SCNC: 5.2 MMOL/L
PROT SERPL-MCNC: 7.4 G/DL
SODIUM SERPL-SCNC: 141 MMOL/L
TRIGL SERPL-MCNC: 178 MG/DL

## 2022-08-24 ENCOUNTER — RX RENEWAL (OUTPATIENT)
Age: 82
End: 2022-08-24

## 2022-10-21 ENCOUNTER — RX RENEWAL (OUTPATIENT)
Age: 82
End: 2022-10-21

## 2022-11-15 ENCOUNTER — RX CHANGE (OUTPATIENT)
Age: 82
End: 2022-11-15

## 2022-11-15 RX ORDER — PANTOPRAZOLE 40 MG/1
40 TABLET, DELAYED RELEASE ORAL
Qty: 30 | Refills: 0 | Status: DISCONTINUED | COMMUNITY
Start: 2021-08-21 | End: 2022-11-15

## 2022-11-17 ENCOUNTER — APPOINTMENT (OUTPATIENT)
Dept: CARDIOLOGY | Facility: CLINIC | Age: 82
End: 2022-11-17

## 2022-11-17 ENCOUNTER — NON-APPOINTMENT (OUTPATIENT)
Age: 82
End: 2022-11-17

## 2022-11-17 ENCOUNTER — APPOINTMENT (OUTPATIENT)
Dept: INTERNAL MEDICINE | Facility: CLINIC | Age: 82
End: 2022-11-17

## 2022-11-17 VITALS
DIASTOLIC BLOOD PRESSURE: 74 MMHG | SYSTOLIC BLOOD PRESSURE: 162 MMHG | RESPIRATION RATE: 12 BRPM | SYSTOLIC BLOOD PRESSURE: 130 MMHG | TEMPERATURE: 96.8 F | WEIGHT: 208 LBS | HEIGHT: 65 IN | BODY MASS INDEX: 34.66 KG/M2 | HEART RATE: 76 BPM | DIASTOLIC BLOOD PRESSURE: 87 MMHG | OXYGEN SATURATION: 97 %

## 2022-11-17 PROCEDURE — 99213 OFFICE O/P EST LOW 20 MIN: CPT

## 2022-11-17 PROCEDURE — 93000 ELECTROCARDIOGRAM COMPLETE: CPT

## 2022-11-17 PROCEDURE — 99214 OFFICE O/P EST MOD 30 MIN: CPT

## 2022-11-17 NOTE — HISTORY OF PRESENT ILLNESS
[de-identified] : COMES FOR F/U \par OFFERS NO COMPLAINS\par HAD INFLUENZA AND COVID-19 VACCINE 9/22

## 2022-11-17 NOTE — ASSESSMENT
[FreeTextEntry1] : F/U VISIT OF 81 Y OLD FEM WITH PMX OF AS ,HTN ,DYSLIPIDEMIA ,LVH AND OBESITY = LABS ORDERED\par RTO 3/23 FOR CPE

## 2022-11-18 NOTE — DISCUSSION/SUMMARY
[FreeTextEntry1] : The patient is an 81-year-old obese female history of hypertension, hyperlipidemia,  LVH,  aortic stenosis s/p TAVR., LBBB, PVC who is asymptomatic.\par #1 AS-  s/p TAVR, no indication at present for PPM,  PVC under control , c/w toprol\par #2 Htn- c/w losartan/HCTZ\par #3 Lipids- therapeutic, c/w atorvastatin\par #4 General- We discussed adherence to a Mediterranean diet, weight loss and at least 30 minutes of daily exercise. COVID Vaccinated and booster.\par  [EKG obtained to assist in diagnosis and management of assessed problem(s)] : EKG obtained to assist in diagnosis and management of assessed problem(s)

## 2022-11-18 NOTE — HISTORY OF PRESENT ILLNESS
[FreeTextEntry1] : Rachel is very relaxed and feels well. She can do whatever she wants. No CP, palpitations, SOB or dizziness.

## 2022-11-19 LAB
ALBUMIN SERPL ELPH-MCNC: 4.4 G/DL
ALP BLD-CCNC: 112 U/L
ALT SERPL-CCNC: 9 U/L
ANION GAP SERPL CALC-SCNC: 12 MMOL/L
AST SERPL-CCNC: 18 U/L
BILIRUB SERPL-MCNC: 1 MG/DL
BUN SERPL-MCNC: 28 MG/DL
CALCIUM SERPL-MCNC: 10 MG/DL
CHLORIDE SERPL-SCNC: 103 MMOL/L
CO2 SERPL-SCNC: 25 MMOL/L
CREAT SERPL-MCNC: 1.35 MG/DL
EGFR: 39 ML/MIN/1.73M2
ESTIMATED AVERAGE GLUCOSE: 108 MG/DL
GLUCOSE SERPL-MCNC: 98 MG/DL
HBA1C MFR BLD HPLC: 5.4 %
POTASSIUM SERPL-SCNC: 4.3 MMOL/L
PROT SERPL-MCNC: 7.1 G/DL
SODIUM SERPL-SCNC: 140 MMOL/L

## 2022-11-21 LAB
CHOLEST SERPL-MCNC: 167 MG/DL
HDLC SERPL-MCNC: 43 MG/DL
LDLC SERPL CALC-MCNC: 96 MG/DL
NONHDLC SERPL-MCNC: 124 MG/DL
TRIGL SERPL-MCNC: 141 MG/DL

## 2022-12-19 ENCOUNTER — APPOINTMENT (OUTPATIENT)
Dept: UROLOGY | Facility: CLINIC | Age: 82
End: 2022-12-19

## 2022-12-19 VITALS
HEART RATE: 87 BPM | WEIGHT: 208 LBS | DIASTOLIC BLOOD PRESSURE: 84 MMHG | HEIGHT: 65 IN | TEMPERATURE: 97.6 F | BODY MASS INDEX: 34.66 KG/M2 | SYSTOLIC BLOOD PRESSURE: 150 MMHG | OXYGEN SATURATION: 97 % | RESPIRATION RATE: 12 BRPM

## 2022-12-19 DIAGNOSIS — R32 UNSPECIFIED URINARY INCONTINENCE: ICD-10-CM

## 2022-12-19 DIAGNOSIS — N39.0 URINARY TRACT INFECTION, SITE NOT SPECIFIED: ICD-10-CM

## 2022-12-19 PROCEDURE — 99213 OFFICE O/P EST LOW 20 MIN: CPT

## 2022-12-19 NOTE — ASSESSMENT
[FreeTextEntry1] : Very pleasant 81-year-old woman who presents for follow-up of recurrent urinary tract infections\par -Creatinine 1.35, stable\par -A1c 5.4%\par -Continue cranberry supplements\par -Follow-up in 1 year or sooner if necessary

## 2022-12-19 NOTE — HISTORY OF PRESENT ILLNESS
[FreeTextEntry1] : Very pleasant 81-year-old woman who presents for follow-up of recurrent urinary tract infections.  Most recent urinary tract infection was in February 2021 and was treated with Cipro.  At this time she feels well.  She continues to take cranberry supplements.  She denies dysuria.  No hematuria.  No flank pain or suprapubic pain.  No other complaints.

## 2022-12-19 NOTE — PHYSICAL EXAM

## 2023-03-01 NOTE — PROGRESS NOTE ADULT - THIS PATIENT HAS THE FOLLOWING CONDITION(S)/DIAGNOSES ON THIS ADMISSION:
None Dupixent Counseling: I discussed with the patient the risks of dupilumab including but not limited to eye infection and irritation, cold sores, injection site reactions, worsening of asthma, allergic reactions and increased risk of parasitic infection.  Live vaccines should be avoided while taking dupilumab. Dupilumab will also interact with certain medications such as warfarin and cyclosporine. The patient understands that monitoring is required and they must alert us or the primary physician if symptoms of infection or other concerning signs are noted.

## 2023-03-23 ENCOUNTER — LABORATORY RESULT (OUTPATIENT)
Age: 83
End: 2023-03-23

## 2023-03-23 ENCOUNTER — NON-APPOINTMENT (OUTPATIENT)
Age: 83
End: 2023-03-23

## 2023-03-23 ENCOUNTER — APPOINTMENT (OUTPATIENT)
Dept: INTERNAL MEDICINE | Facility: CLINIC | Age: 83
End: 2023-03-23
Payer: MEDICARE

## 2023-03-23 ENCOUNTER — APPOINTMENT (OUTPATIENT)
Dept: CARDIOLOGY | Facility: CLINIC | Age: 83
End: 2023-03-23
Payer: MEDICARE

## 2023-03-23 VITALS
OXYGEN SATURATION: 96 % | DIASTOLIC BLOOD PRESSURE: 73 MMHG | TEMPERATURE: 97.7 F | SYSTOLIC BLOOD PRESSURE: 150 MMHG | HEART RATE: 84 BPM | RESPIRATION RATE: 14 BRPM | BODY MASS INDEX: 33.95 KG/M2 | WEIGHT: 204 LBS

## 2023-03-23 VITALS — SYSTOLIC BLOOD PRESSURE: 128 MMHG | DIASTOLIC BLOOD PRESSURE: 75 MMHG | HEART RATE: 80 BPM

## 2023-03-23 DIAGNOSIS — Z00.00 ENCOUNTER FOR GENERAL ADULT MEDICAL EXAMINATION W/OUT ABNORMAL FINDINGS: ICD-10-CM

## 2023-03-23 PROCEDURE — 99213 OFFICE O/P EST LOW 20 MIN: CPT

## 2023-03-23 PROCEDURE — 99397 PER PM REEVAL EST PAT 65+ YR: CPT | Mod: GY

## 2023-03-23 PROCEDURE — 93000 ELECTROCARDIOGRAM COMPLETE: CPT

## 2023-03-23 NOTE — PHYSICAL EXAM
[No Acute Distress] : no acute distress [No Edema] : there was no peripheral edema [Soft] : abdomen soft [Obese] : obese [Normal] : normal [Soft, Nontender] : the abdomen was soft and nontender [No Mass] : no masses were palpated [No HSM] : no hepatosplenomegaly noted [No Rash] : no rash [No Focal Deficits] : no focal deficits [Alert and Oriented x3] : oriented to person, place, and time [de-identified] : ANALI 2/6

## 2023-03-23 NOTE — ASSESSMENT
[FreeTextEntry1] : CPE OF 82 Y OLD FEM WITH PMX OF HTN ,TAVR,DYSLIPIDEMIA AND OBESITY = LABS TODAY \par RTO 3-4 M

## 2023-03-24 LAB
25(OH)D3 SERPL-MCNC: 69.3 NG/ML
ALBUMIN SERPL ELPH-MCNC: 4.4 G/DL
ALP BLD-CCNC: 113 U/L
ALT SERPL-CCNC: 13 U/L
ANION GAP SERPL CALC-SCNC: 13 MMOL/L
APPEARANCE: CLEAR
AST SERPL-CCNC: 23 U/L
BASOPHILS # BLD AUTO: 0.05 K/UL
BASOPHILS NFR BLD AUTO: 0.9 %
BILIRUB SERPL-MCNC: 0.7 MG/DL
BILIRUBIN URINE: NEGATIVE
BLOOD URINE: NEGATIVE
BUN SERPL-MCNC: 27 MG/DL
CALCIUM SERPL-MCNC: 9.9 MG/DL
CHLORIDE SERPL-SCNC: 104 MMOL/L
CHOLEST SERPL-MCNC: 148 MG/DL
CO2 SERPL-SCNC: 25 MMOL/L
COLOR: YELLOW
CREAT SERPL-MCNC: 1.52 MG/DL
EGFR: 34 ML/MIN/1.73M2
EOSINOPHIL # BLD AUTO: 0.12 K/UL
EOSINOPHIL NFR BLD AUTO: 2.1 %
GLUCOSE QUALITATIVE U: NEGATIVE
GLUCOSE SERPL-MCNC: 122 MG/DL
HCT VFR BLD CALC: 41.7 %
HDLC SERPL-MCNC: 45 MG/DL
HGB BLD-MCNC: 13.1 G/DL
IMM GRANULOCYTES NFR BLD AUTO: 0.2 %
KETONES URINE: NEGATIVE
LDLC SERPL CALC-MCNC: 76 MG/DL
LEUKOCYTE ESTERASE URINE: NEGATIVE
LYMPHOCYTES # BLD AUTO: 1.29 K/UL
LYMPHOCYTES NFR BLD AUTO: 22.6 %
MAN DIFF?: NORMAL
MCHC RBC-ENTMCNC: 29.4 PG
MCHC RBC-ENTMCNC: 31.4 GM/DL
MCV RBC AUTO: 93.5 FL
MONOCYTES # BLD AUTO: 0.34 K/UL
MONOCYTES NFR BLD AUTO: 5.9 %
NEUTROPHILS # BLD AUTO: 3.91 K/UL
NEUTROPHILS NFR BLD AUTO: 68.3 %
NITRITE URINE: NEGATIVE
NONHDLC SERPL-MCNC: 103 MG/DL
PH URINE: 6.5
PLATELET # BLD AUTO: 123 K/UL
POTASSIUM SERPL-SCNC: 4.4 MMOL/L
PROT SERPL-MCNC: 6.6 G/DL
PROTEIN URINE: ABNORMAL
RBC # BLD: 4.46 M/UL
RBC # FLD: 13 %
SODIUM SERPL-SCNC: 142 MMOL/L
SPECIFIC GRAVITY URINE: 1.02
TRIGL SERPL-MCNC: 134 MG/DL
TSH SERPL-ACNC: 0.93 UIU/ML
UROBILINOGEN URINE: NORMAL
WBC # FLD AUTO: 5.72 K/UL

## 2023-03-24 NOTE — DISCUSSION/SUMMARY
[FreeTextEntry1] : The patient is an 82-year-old obese female  HTN, HLD, LVH,  AS s/p TAVR., LBBB, PVC who is asymptomatic.\par #1 AS-  s/p TAVR, no indication at present for PPM,  PVC under control , c/w toprol\par #2 HTN- c/w losartan/HCTZ\par #3 HLD- therapeutic, c/w atorvastatin\par #4 General- We discussed adherence to a Mediterranean diet, weight loss and at least 30 minutes of daily exercise. COVID Vaccinated and booster.\par  [EKG obtained to assist in diagnosis and management of assessed problem(s)] : EKG obtained to assist in diagnosis and management of assessed problem(s)

## 2023-03-24 NOTE — HISTORY OF PRESENT ILLNESS
[FreeTextEntry1] : Rachel continues to feel well.  She is encouraged that her  was able to gain a few pounds and is eating better.  She denies any chest pain, palpitations or shortness of breath and is much better able to do her daily activities.

## 2023-04-26 LAB
ANION GAP SERPL CALC-SCNC: 16 MMOL/L
BUN SERPL-MCNC: 26 MG/DL
CALCIUM SERPL-MCNC: 10.2 MG/DL
CHLORIDE SERPL-SCNC: 104 MMOL/L
CO2 SERPL-SCNC: 23 MMOL/L
CREAT SERPL-MCNC: 1.51 MG/DL
EGFR: 34 ML/MIN/1.73M2
GLUCOSE SERPL-MCNC: 99 MG/DL
POTASSIUM SERPL-SCNC: 4.2 MMOL/L
SODIUM SERPL-SCNC: 143 MMOL/L

## 2023-07-27 ENCOUNTER — APPOINTMENT (OUTPATIENT)
Dept: INTERNAL MEDICINE | Facility: CLINIC | Age: 83
End: 2023-07-27
Payer: MEDICARE

## 2023-07-27 ENCOUNTER — APPOINTMENT (OUTPATIENT)
Dept: CARDIOLOGY | Facility: CLINIC | Age: 83
End: 2023-07-27
Payer: MEDICARE

## 2023-07-27 VITALS
HEIGHT: 65 IN | OXYGEN SATURATION: 97 % | HEART RATE: 84 BPM | TEMPERATURE: 98.7 F | WEIGHT: 210 LBS | SYSTOLIC BLOOD PRESSURE: 124 MMHG | RESPIRATION RATE: 14 BRPM | BODY MASS INDEX: 34.99 KG/M2 | DIASTOLIC BLOOD PRESSURE: 76 MMHG

## 2023-07-27 DIAGNOSIS — Z91.81 HISTORY OF FALLING: ICD-10-CM

## 2023-07-27 DIAGNOSIS — R29.6 REPEATED FALLS: ICD-10-CM

## 2023-07-27 DIAGNOSIS — R73.03 PREDIABETES.: ICD-10-CM

## 2023-07-27 LAB
CHOLEST SERPL-MCNC: 146 MG/DL
HDLC SERPL-MCNC: 48 MG/DL
LDLC SERPL CALC-MCNC: 73 MG/DL
NONHDLC SERPL-MCNC: 98 MG/DL
TRIGL SERPL-MCNC: 147 MG/DL

## 2023-07-27 PROCEDURE — 93000 ELECTROCARDIOGRAM COMPLETE: CPT

## 2023-07-27 PROCEDURE — 99213 OFFICE O/P EST LOW 20 MIN: CPT

## 2023-07-27 PROCEDURE — 99214 OFFICE O/P EST MOD 30 MIN: CPT

## 2023-07-27 NOTE — ASSESSMENT
[FreeTextEntry1] : 82 Y OLD FEM WITH PMX OF HTN ,AS ,CKD ,OBESITY AND S/P FALL 5 DAYS AGO = CT HEAD ,XR NASAL BONES ;LABS \par RTO 3-4 M

## 2023-07-27 NOTE — PHYSICAL EXAM
[No Acute Distress] : no acute distress [Normal Sclera/Conjunctiva] : normal sclera/conjunctiva [No Respiratory Distress] : no respiratory distress  [Normal Rate] : normal rate  [II] : a grade 2 [___ +] : bilateral [unfilled]U+ pitting edema to the ankles [Soft] : abdomen soft [Non Tender] : non-tender [Normal Bowel Sounds] : normal bowel sounds [Coordination Grossly Intact] : coordination grossly intact [No Focal Deficits] : no focal deficits [Alert and Oriented x3] : oriented to person, place, and time [de-identified] : NASAL MUCOSAE EDEMA  [de-identified] : EXCORIATIONS FRONTAL AND CHEEK AREA JULIO

## 2023-07-27 NOTE — HISTORY OF PRESENT ILLNESS
[de-identified] : COMES FOR F/U \par WHILE GETTING OUT OF HER CAR 7/22/23 FELT FACE FIRST ON PARKING LOT ,WAS HELPED TO GET UP AND WENT HOME \par CC OF NASAL CONGESTION ,NO OTHER SX

## 2023-07-28 LAB
ALBUMIN SERPL ELPH-MCNC: 4.3 G/DL
ALP BLD-CCNC: 112 U/L
ALT SERPL-CCNC: 12 U/L
ANION GAP SERPL CALC-SCNC: 13 MMOL/L
AST SERPL-CCNC: 20 U/L
BILIRUB SERPL-MCNC: 0.5 MG/DL
BUN SERPL-MCNC: 24 MG/DL
CALCIUM SERPL-MCNC: 9.9 MG/DL
CHLORIDE SERPL-SCNC: 106 MMOL/L
CO2 SERPL-SCNC: 24 MMOL/L
CREAT SERPL-MCNC: 1.61 MG/DL
EGFR: 32 ML/MIN/1.73M2
GLUCOSE SERPL-MCNC: 105 MG/DL
POTASSIUM SERPL-SCNC: 4.4 MMOL/L
PROT SERPL-MCNC: 7 G/DL
SODIUM SERPL-SCNC: 143 MMOL/L

## 2023-07-28 NOTE — HISTORY OF PRESENT ILLNESS
[FreeTextEntry1] : Rachel has been feeling well. No CP, palpitations or SOB. Does not go out much in hot weather.

## 2023-08-02 ENCOUNTER — APPOINTMENT (OUTPATIENT)
Dept: INTERNAL MEDICINE | Facility: CLINIC | Age: 83
End: 2023-08-02
Payer: MEDICARE

## 2023-08-02 PROCEDURE — 90715 TDAP VACCINE 7 YRS/> IM: CPT

## 2023-08-02 PROCEDURE — 90471 IMMUNIZATION ADMIN: CPT

## 2023-09-22 LAB
ANION GAP SERPL CALC-SCNC: 13 MMOL/L
BUN SERPL-MCNC: 33 MG/DL
CALCIUM SERPL-MCNC: 10.2 MG/DL
CHLORIDE SERPL-SCNC: 104 MMOL/L
CO2 SERPL-SCNC: 27 MMOL/L
CREAT SERPL-MCNC: 1.48 MG/DL
EGFR: 35 ML/MIN/1.73M2
GLUCOSE SERPL-MCNC: 95 MG/DL
POTASSIUM SERPL-SCNC: 4.1 MMOL/L
SODIUM SERPL-SCNC: 144 MMOL/L

## 2023-09-26 ENCOUNTER — APPOINTMENT (OUTPATIENT)
Dept: INTERNAL MEDICINE | Facility: CLINIC | Age: 83
End: 2023-09-26
Payer: MEDICARE

## 2023-09-26 VITALS
BODY MASS INDEX: 33.32 KG/M2 | SYSTOLIC BLOOD PRESSURE: 136 MMHG | HEIGHT: 65 IN | OXYGEN SATURATION: 98 % | WEIGHT: 200 LBS | HEART RATE: 76 BPM | DIASTOLIC BLOOD PRESSURE: 71 MMHG | RESPIRATION RATE: 14 BRPM

## 2023-09-26 DIAGNOSIS — Z95.2 PRESENCE OF PROSTHETIC HEART VALVE: ICD-10-CM

## 2023-09-26 PROCEDURE — 99214 OFFICE O/P EST MOD 30 MIN: CPT

## 2023-09-29 RX ORDER — ASPIRIN ENTERIC COATED TABLETS 81 MG 81 MG/1
81 TABLET, DELAYED RELEASE ORAL
Qty: 90 | Refills: 0 | Status: ACTIVE | COMMUNITY
Start: 1900-01-01 | End: 1900-01-01

## 2023-11-30 ENCOUNTER — APPOINTMENT (OUTPATIENT)
Dept: INTERNAL MEDICINE | Facility: CLINIC | Age: 83
End: 2023-11-30
Payer: MEDICARE

## 2023-11-30 ENCOUNTER — NON-APPOINTMENT (OUTPATIENT)
Age: 83
End: 2023-11-30

## 2023-11-30 ENCOUNTER — APPOINTMENT (OUTPATIENT)
Dept: CARDIOLOGY | Facility: CLINIC | Age: 83
End: 2023-11-30
Payer: MEDICARE

## 2023-11-30 VITALS
RESPIRATION RATE: 14 BRPM | TEMPERATURE: 97.2 F | DIASTOLIC BLOOD PRESSURE: 63 MMHG | HEART RATE: 74 BPM | SYSTOLIC BLOOD PRESSURE: 144 MMHG | OXYGEN SATURATION: 96 %

## 2023-11-30 DIAGNOSIS — I51.7 CARDIOMEGALY: ICD-10-CM

## 2023-11-30 PROCEDURE — 99213 OFFICE O/P EST LOW 20 MIN: CPT

## 2023-11-30 PROCEDURE — 99214 OFFICE O/P EST MOD 30 MIN: CPT

## 2023-11-30 PROCEDURE — 93000 ELECTROCARDIOGRAM COMPLETE: CPT

## 2023-12-01 LAB
ALBUMIN SERPL ELPH-MCNC: 4.3 G/DL
ALP BLD-CCNC: 90 U/L
ALT SERPL-CCNC: 13 U/L
ANION GAP SERPL CALC-SCNC: 13 MMOL/L
AST SERPL-CCNC: 19 U/L
BILIRUB SERPL-MCNC: 0.7 MG/DL
BUN SERPL-MCNC: 26 MG/DL
CALCIUM SERPL-MCNC: 9.6 MG/DL
CHLORIDE SERPL-SCNC: 106 MMOL/L
CHOLEST SERPL-MCNC: 127 MG/DL
CO2 SERPL-SCNC: 26 MMOL/L
CREAT SERPL-MCNC: 1.5 MG/DL
EGFR: 34 ML/MIN/1.73M2
GLUCOSE SERPL-MCNC: 115 MG/DL
HDLC SERPL-MCNC: 45 MG/DL
LDLC SERPL CALC-MCNC: 61 MG/DL
NONHDLC SERPL-MCNC: 82 MG/DL
POTASSIUM SERPL-SCNC: 4.7 MMOL/L
PROT SERPL-MCNC: 7.1 G/DL
SODIUM SERPL-SCNC: 145 MMOL/L
TRIGL SERPL-MCNC: 119 MG/DL

## 2023-12-28 ENCOUNTER — RX RENEWAL (OUTPATIENT)
Age: 83
End: 2023-12-28

## 2023-12-28 RX ORDER — ASPIRIN 81 MG/1
81 TABLET, COATED ORAL
Qty: 90 | Refills: 3 | Status: ACTIVE | COMMUNITY
Start: 2023-12-28 | End: 1900-01-01

## 2023-12-28 RX ORDER — MULTIVIT-MIN/FOLIC/VIT K/LYCOP 400-300MCG
25 MCG TABLET ORAL DAILY
Qty: 90 | Refills: 3 | Status: ACTIVE | COMMUNITY
Start: 2021-04-19 | End: 1900-01-01

## 2024-01-26 ENCOUNTER — APPOINTMENT (OUTPATIENT)
Dept: INTERNAL MEDICINE | Facility: CLINIC | Age: 84
End: 2024-01-26
Payer: MEDICARE

## 2024-01-26 VITALS
HEART RATE: 81 BPM | HEIGHT: 65 IN | BODY MASS INDEX: 33.82 KG/M2 | OXYGEN SATURATION: 95 % | DIASTOLIC BLOOD PRESSURE: 85 MMHG | RESPIRATION RATE: 14 BRPM | WEIGHT: 203 LBS | SYSTOLIC BLOOD PRESSURE: 151 MMHG

## 2024-01-26 DIAGNOSIS — R79.89 OTHER SPECIFIED ABNORMAL FINDINGS OF BLOOD CHEMISTRY: ICD-10-CM

## 2024-01-26 PROCEDURE — 99214 OFFICE O/P EST MOD 30 MIN: CPT

## 2024-01-26 RX ORDER — PANTOPRAZOLE 40 MG/1
40 TABLET, DELAYED RELEASE ORAL
Qty: 90 | Refills: 3 | Status: ACTIVE | COMMUNITY
Start: 2022-11-15

## 2024-01-26 NOTE — PHYSICAL EXAM
[No Acute Distress] : no acute distress [Normal Sclera/Conjunctiva] : normal sclera/conjunctiva [Normal Outer Ear/Nose] : the outer ears and nose were normal in appearance [No Respiratory Distress] : no respiratory distress  [No JVD] : no jugular venous distention [No Accessory Muscle Use] : no accessory muscle use [Regular Rhythm] : with a regular rhythm [Normal Rate] : normal rate  [No Edema] : there was no peripheral edema [Obese] : obese [Normal] : normal [Soft, Nontender] : the abdomen was soft and nontender [No Mass] : no masses were palpated [No HSM] : no hepatosplenomegaly noted [de-identified] : 2/6 ANALI  [Alert and Oriented x3] : oriented to person, place, and time

## 2024-01-26 NOTE — ASSESSMENT
[FreeTextEntry1] : 83 Y OLDFEM WITH STABLE CKD ,AS ,HTN ,DYSLIPIDEMIA = SAME MEDS  GERD = ASYMPTOMATIC = TAPER PROTONIX AS TOLERATED  RTO 4/24

## 2024-02-12 RX ORDER — METOPROLOL SUCCINATE 25 MG/1
25 TABLET, EXTENDED RELEASE ORAL
Qty: 45 | Refills: 1 | Status: ACTIVE | COMMUNITY
Start: 2020-10-15 | End: 1900-01-01

## 2024-03-19 ENCOUNTER — NON-APPOINTMENT (OUTPATIENT)
Age: 84
End: 2024-03-19

## 2024-03-27 ENCOUNTER — RX RENEWAL (OUTPATIENT)
Age: 84
End: 2024-03-27

## 2024-03-27 RX ORDER — LOSARTAN POTASSIUM AND HYDROCHLOROTHIAZIDE 12.5; 1 MG/1; MG/1
100-12.5 TABLET ORAL DAILY
Qty: 90 | Refills: 3 | Status: ACTIVE | COMMUNITY
Start: 2022-07-14 | End: 1900-01-01

## 2024-04-04 ENCOUNTER — LABORATORY RESULT (OUTPATIENT)
Age: 84
End: 2024-04-04

## 2024-04-04 ENCOUNTER — APPOINTMENT (OUTPATIENT)
Dept: INTERNAL MEDICINE | Facility: CLINIC | Age: 84
End: 2024-04-04
Payer: MEDICARE

## 2024-04-04 ENCOUNTER — APPOINTMENT (OUTPATIENT)
Dept: CARDIOLOGY | Facility: CLINIC | Age: 84
End: 2024-04-04
Payer: MEDICARE

## 2024-04-04 ENCOUNTER — NON-APPOINTMENT (OUTPATIENT)
Age: 84
End: 2024-04-04

## 2024-04-04 VITALS
TEMPERATURE: 97.2 F | HEIGHT: 65 IN | DIASTOLIC BLOOD PRESSURE: 81 MMHG | RESPIRATION RATE: 14 BRPM | WEIGHT: 207 LBS | SYSTOLIC BLOOD PRESSURE: 158 MMHG | OXYGEN SATURATION: 95 % | HEART RATE: 77 BPM | BODY MASS INDEX: 34.49 KG/M2

## 2024-04-04 DIAGNOSIS — I10 ESSENTIAL (PRIMARY) HYPERTENSION: ICD-10-CM

## 2024-04-04 DIAGNOSIS — E66.9 OBESITY, UNSPECIFIED: ICD-10-CM

## 2024-04-04 DIAGNOSIS — E78.5 HYPERLIPIDEMIA, UNSPECIFIED: ICD-10-CM

## 2024-04-04 DIAGNOSIS — K21.9 GASTRO-ESOPHAGEAL REFLUX DISEASE W/OUT ESOPHAGITIS: ICD-10-CM

## 2024-04-04 DIAGNOSIS — M17.10 UNILATERAL PRIMARY OSTEOARTHRITIS, UNSPECIFIED KNEE: ICD-10-CM

## 2024-04-04 DIAGNOSIS — I35.0 NONRHEUMATIC AORTIC (VALVE) STENOSIS: ICD-10-CM

## 2024-04-04 DIAGNOSIS — I44.7 LEFT BUNDLE-BRANCH BLOCK, UNSPECIFIED: ICD-10-CM

## 2024-04-04 PROCEDURE — 99214 OFFICE O/P EST MOD 30 MIN: CPT

## 2024-04-04 PROCEDURE — G2211 COMPLEX E/M VISIT ADD ON: CPT

## 2024-04-04 PROCEDURE — 93000 ELECTROCARDIOGRAM COMPLETE: CPT

## 2024-04-04 NOTE — PHYSICAL EXAM
[No Acute Distress] : no acute distress [Normal Sclera/Conjunctiva] : normal sclera/conjunctiva [Normal] : normal rate, regular rhythm, normal S1 and S2 and no murmur heard [No Edema] : there was no peripheral edema [Soft] : abdomen soft [Non Tender] : non-tender [Normal Bowel Sounds] : normal bowel sounds [Normal Anterior Cervical Nodes] : no anterior cervical lymphadenopathy [No CVA Tenderness] : no CVA  tenderness [No Joint Swelling] : no joint swelling [No Rash] : no rash [Coordination Grossly Intact] : coordination grossly intact [No Focal Deficits] : no focal deficits [Alert and Oriented x3] : oriented to person, place, and time

## 2024-04-04 NOTE — HISTORY OF PRESENT ILLNESS
[de-identified] : COMES FOR F/U ;OFFERS NO NEW COMPLAINS ;JULIO KNEE ARTHRALGIAS ON /OFF TRACE SAME

## 2024-04-04 NOTE — DISCUSSION/SUMMARY
[FreeTextEntry1] : The patient is an 83-year-old obese female HTN, HLD, LVH, AS s/p TAVR., LBBB, PVC under control.  #1 AS- s/p TAVR, no indication at present for PPM, c/w toprol #2 HTN- c/w losartan/HCTZ #3 HLD- therapeutic, c/w atorvastatin #4 General- We discussed adherence to a Mediterranean diet, weight loss and at least 30 minutes of daily exercise. COVID Vaccinated and booster.  [EKG obtained to assist in diagnosis and management of assessed problem(s)] : EKG obtained to assist in diagnosis and management of assessed problem(s)

## 2024-04-04 NOTE — ASSESSMENT
[FreeTextEntry1] : 83 Y OLD FEM WITH PMX OF HTN ,AS ,DYSLIPIDEMIA ,OBESITY ,JULIO KNEE OA = LABS TODAY  FURTHER RECOMM AS PER RESULTS  SEEN BY CARDIO TODAY AS WELL  RTO 3 M

## 2024-04-05 LAB
25(OH)D3 SERPL-MCNC: 66.7 NG/ML
ALBUMIN SERPL ELPH-MCNC: 4.3 G/DL
ALP BLD-CCNC: 107 U/L
ALT SERPL-CCNC: 11 U/L
ANION GAP SERPL CALC-SCNC: 13 MMOL/L
APPEARANCE: CLEAR
AST SERPL-CCNC: 19 U/L
BILIRUB SERPL-MCNC: 0.6 MG/DL
BILIRUBIN URINE: NEGATIVE
BLOOD URINE: NEGATIVE
BUN SERPL-MCNC: 29 MG/DL
CALCIUM SERPL-MCNC: 9.7 MG/DL
CHLORIDE SERPL-SCNC: 104 MMOL/L
CHOLEST SERPL-MCNC: 170 MG/DL
CO2 SERPL-SCNC: 24 MMOL/L
COLOR: YELLOW
CREAT SERPL-MCNC: 1.53 MG/DL
EGFR: 33 ML/MIN/1.73M2
GLUCOSE QUALITATIVE U: NEGATIVE MG/DL
GLUCOSE SERPL-MCNC: 97 MG/DL
HCT VFR BLD CALC: 41.1 %
HDLC SERPL-MCNC: 47 MG/DL
HGB BLD-MCNC: 13.3 G/DL
KETONES URINE: NEGATIVE MG/DL
LDLC SERPL CALC-MCNC: 92 MG/DL
LEUKOCYTE ESTERASE URINE: ABNORMAL
MCHC RBC-ENTMCNC: 30 PG
MCHC RBC-ENTMCNC: 32.4 GM/DL
MCV RBC AUTO: 92.8 FL
NITRITE URINE: POSITIVE
NONHDLC SERPL-MCNC: 123 MG/DL
PH URINE: 7.5
PLATELET # BLD AUTO: 154 K/UL
POTASSIUM SERPL-SCNC: 4.4 MMOL/L
PROT SERPL-MCNC: 6.8 G/DL
PROTEIN URINE: NORMAL MG/DL
RBC # BLD: 4.43 M/UL
RBC # FLD: 12.7 %
SODIUM SERPL-SCNC: 142 MMOL/L
SPECIFIC GRAVITY URINE: 1.02
TRIGL SERPL-MCNC: 183 MG/DL
TSH SERPL-ACNC: 0.92 UIU/ML
UROBILINOGEN URINE: 0.2 MG/DL
WBC # FLD AUTO: 5.93 K/UL

## 2024-07-11 ENCOUNTER — APPOINTMENT (OUTPATIENT)
Dept: INTERNAL MEDICINE | Facility: CLINIC | Age: 84
End: 2024-07-11

## 2024-07-11 ENCOUNTER — APPOINTMENT (OUTPATIENT)
Dept: CARDIOLOGY | Facility: CLINIC | Age: 84
End: 2024-07-11
Payer: MEDICARE

## 2024-07-11 ENCOUNTER — NON-APPOINTMENT (OUTPATIENT)
Age: 84
End: 2024-07-11

## 2024-07-11 VITALS
WEIGHT: 205 LBS | OXYGEN SATURATION: 97 % | SYSTOLIC BLOOD PRESSURE: 143 MMHG | HEIGHT: 65 IN | BODY MASS INDEX: 34.16 KG/M2 | HEART RATE: 56 BPM | DIASTOLIC BLOOD PRESSURE: 84 MMHG | RESPIRATION RATE: 19 BRPM

## 2024-07-11 DIAGNOSIS — E78.5 HYPERLIPIDEMIA, UNSPECIFIED: ICD-10-CM

## 2024-07-11 DIAGNOSIS — I10 ESSENTIAL (PRIMARY) HYPERTENSION: ICD-10-CM

## 2024-07-11 DIAGNOSIS — Z95.2 PRESENCE OF PROSTHETIC HEART VALVE: ICD-10-CM

## 2024-07-11 DIAGNOSIS — E66.9 OBESITY, UNSPECIFIED: ICD-10-CM

## 2024-07-11 DIAGNOSIS — K21.9 GASTRO-ESOPHAGEAL REFLUX DISEASE W/OUT ESOPHAGITIS: ICD-10-CM

## 2024-07-11 DIAGNOSIS — I35.0 NONRHEUMATIC AORTIC (VALVE) STENOSIS: ICD-10-CM

## 2024-07-11 DIAGNOSIS — Z00.00 ENCOUNTER FOR GENERAL ADULT MEDICAL EXAMINATION W/OUT ABNORMAL FINDINGS: ICD-10-CM

## 2024-07-11 PROCEDURE — 99213 OFFICE O/P EST LOW 20 MIN: CPT

## 2024-07-11 PROCEDURE — G0439: CPT

## 2024-07-11 PROCEDURE — 93000 ELECTROCARDIOGRAM COMPLETE: CPT

## 2024-07-11 PROCEDURE — G2211 COMPLEX E/M VISIT ADD ON: CPT

## 2024-07-12 LAB
ALBUMIN SERPL ELPH-MCNC: 4.3 G/DL
ALP BLD-CCNC: 100 U/L
ANION GAP SERPL CALC-SCNC: 15 MMOL/L
AST SERPL-CCNC: 20 U/L
BILIRUB SERPL-MCNC: 0.8 MG/DL
BUN SERPL-MCNC: 23 MG/DL
CALCIUM SERPL-MCNC: 10.1 MG/DL
CHLORIDE SERPL-SCNC: 104 MMOL/L
CHOLEST SERPL-MCNC: 151 MG/DL
CO2 SERPL-SCNC: 25 MMOL/L
CREAT SERPL-MCNC: 1.62 MG/DL
EGFR: 31 ML/MIN/1.73M2
GLUCOSE SERPL-MCNC: 93 MG/DL
HDLC SERPL-MCNC: 45 MG/DL
LDLC SERPL CALC-MCNC: 80 MG/DL
NONHDLC SERPL-MCNC: 106 MG/DL
POTASSIUM SERPL-SCNC: 4.1 MMOL/L
PROT SERPL-MCNC: 7.4 G/DL
SODIUM SERPL-SCNC: 144 MMOL/L
TRIGL SERPL-MCNC: 146 MG/DL

## 2024-09-13 ENCOUNTER — RX RENEWAL (OUTPATIENT)
Age: 84
End: 2024-09-13

## 2024-09-16 ENCOUNTER — NON-APPOINTMENT (OUTPATIENT)
Age: 84
End: 2024-09-16

## 2024-09-16 ENCOUNTER — APPOINTMENT (OUTPATIENT)
Dept: CARDIOLOGY | Facility: CLINIC | Age: 84
End: 2024-09-16
Payer: MEDICARE

## 2024-09-16 VITALS
HEIGHT: 65 IN | OXYGEN SATURATION: 96 % | DIASTOLIC BLOOD PRESSURE: 76 MMHG | RESPIRATION RATE: 19 BRPM | HEART RATE: 70 BPM | TEMPERATURE: 97.2 F | SYSTOLIC BLOOD PRESSURE: 134 MMHG

## 2024-09-16 DIAGNOSIS — I35.0 NONRHEUMATIC AORTIC (VALVE) STENOSIS: ICD-10-CM

## 2024-09-16 DIAGNOSIS — I44.7 LEFT BUNDLE-BRANCH BLOCK, UNSPECIFIED: ICD-10-CM

## 2024-09-16 DIAGNOSIS — I10 ESSENTIAL (PRIMARY) HYPERTENSION: ICD-10-CM

## 2024-09-16 PROCEDURE — 93000 ELECTROCARDIOGRAM COMPLETE: CPT

## 2024-09-16 PROCEDURE — G2211 COMPLEX E/M VISIT ADD ON: CPT

## 2024-09-16 PROCEDURE — 99214 OFFICE O/P EST MOD 30 MIN: CPT

## 2024-09-16 NOTE — HISTORY OF PRESENT ILLNESS
[FreeTextEntry1] : Rachel is here with her daughter. No CP, palpitations, lightheadedness, dizziness or SOB.

## 2024-09-16 NOTE — DISCUSSION/SUMMARY
[FreeTextEntry1] : The patient is an 83-year-old obese female HTN, HLD, LVH, AS s/p TAVR., LBBB, PVC under control.  #1 AS- s/p TAVR, no indication at present for PPM, c/w toprol #2 HTN- c/w losartan/HCTZ #3 HLD- therapeutic, c/w atorvastatin #4 General- We discussed adherence to a Mediterranean diet, weight loss and at least 30 minutes of daily exercise using the stationary pedals she has. COVID Vaccinated and booster. Daughter now very involved.  [EKG obtained to assist in diagnosis and management of assessed problem(s)] : EKG obtained to assist in diagnosis and management of assessed problem(s)

## 2024-10-10 ENCOUNTER — APPOINTMENT (OUTPATIENT)
Dept: INTERNAL MEDICINE | Facility: CLINIC | Age: 84
End: 2024-10-10
Payer: MEDICARE

## 2024-10-10 VITALS
TEMPERATURE: 97.6 F | SYSTOLIC BLOOD PRESSURE: 153 MMHG | OXYGEN SATURATION: 95 % | HEIGHT: 65 IN | HEART RATE: 84 BPM | RESPIRATION RATE: 18 BRPM | BODY MASS INDEX: 33.32 KG/M2 | DIASTOLIC BLOOD PRESSURE: 83 MMHG | WEIGHT: 200 LBS

## 2024-10-10 VITALS — SYSTOLIC BLOOD PRESSURE: 140 MMHG | DIASTOLIC BLOOD PRESSURE: 80 MMHG

## 2024-10-10 DIAGNOSIS — I10 ESSENTIAL (PRIMARY) HYPERTENSION: ICD-10-CM

## 2024-10-10 DIAGNOSIS — I35.0 NONRHEUMATIC AORTIC (VALVE) STENOSIS: ICD-10-CM

## 2024-10-10 DIAGNOSIS — E78.5 HYPERLIPIDEMIA, UNSPECIFIED: ICD-10-CM

## 2024-10-10 DIAGNOSIS — E66.9 OBESITY, UNSPECIFIED: ICD-10-CM

## 2024-10-10 PROCEDURE — 99214 OFFICE O/P EST MOD 30 MIN: CPT

## 2025-01-16 ENCOUNTER — NON-APPOINTMENT (OUTPATIENT)
Age: 85
End: 2025-01-16

## 2025-03-20 ENCOUNTER — LABORATORY RESULT (OUTPATIENT)
Age: 85
End: 2025-03-20

## 2025-03-20 ENCOUNTER — NON-APPOINTMENT (OUTPATIENT)
Age: 85
End: 2025-03-20

## 2025-03-20 ENCOUNTER — APPOINTMENT (OUTPATIENT)
Dept: CARDIOLOGY | Facility: CLINIC | Age: 85
End: 2025-03-20
Payer: MEDICARE

## 2025-03-20 ENCOUNTER — APPOINTMENT (OUTPATIENT)
Dept: INTERNAL MEDICINE | Facility: CLINIC | Age: 85
End: 2025-03-20
Payer: MEDICARE

## 2025-03-20 VITALS
RESPIRATION RATE: 17 BRPM | OXYGEN SATURATION: 96 % | WEIGHT: 195 LBS | HEART RATE: 82 BPM | SYSTOLIC BLOOD PRESSURE: 130 MMHG | DIASTOLIC BLOOD PRESSURE: 70 MMHG | HEIGHT: 65 IN | TEMPERATURE: 97.3 F | BODY MASS INDEX: 32.49 KG/M2

## 2025-03-20 DIAGNOSIS — E66.9 OBESITY, UNSPECIFIED: ICD-10-CM

## 2025-03-20 DIAGNOSIS — I10 ESSENTIAL (PRIMARY) HYPERTENSION: ICD-10-CM

## 2025-03-20 DIAGNOSIS — Z95.2 PRESENCE OF PROSTHETIC HEART VALVE: ICD-10-CM

## 2025-03-20 DIAGNOSIS — K21.9 GASTRO-ESOPHAGEAL REFLUX DISEASE W/OUT ESOPHAGITIS: ICD-10-CM

## 2025-03-20 DIAGNOSIS — I35.0 NONRHEUMATIC AORTIC (VALVE) STENOSIS: ICD-10-CM

## 2025-03-20 DIAGNOSIS — E78.5 HYPERLIPIDEMIA, UNSPECIFIED: ICD-10-CM

## 2025-03-20 DIAGNOSIS — I44.7 LEFT BUNDLE-BRANCH BLOCK, UNSPECIFIED: ICD-10-CM

## 2025-03-20 PROCEDURE — 99214 OFFICE O/P EST MOD 30 MIN: CPT

## 2025-03-20 PROCEDURE — 93000 ELECTROCARDIOGRAM COMPLETE: CPT

## 2025-03-20 PROCEDURE — G2211 COMPLEX E/M VISIT ADD ON: CPT

## 2025-03-21 LAB
25(OH)D3 SERPL-MCNC: 50.4 NG/ML
ALBUMIN SERPL ELPH-MCNC: 4 G/DL
ALP BLD-CCNC: 109 U/L
ALT SERPL-CCNC: 10 U/L
ANION GAP SERPL CALC-SCNC: 12 MMOL/L
APPEARANCE: ABNORMAL
AST SERPL-CCNC: 13 U/L
BILIRUB SERPL-MCNC: 0.8 MG/DL
BILIRUBIN URINE: NEGATIVE
BLOOD URINE: NEGATIVE
BUN SERPL-MCNC: 27 MG/DL
CALCIUM SERPL-MCNC: 9.5 MG/DL
CHLORIDE SERPL-SCNC: 107 MMOL/L
CHOLEST SERPL-MCNC: 148 MG/DL
CO2 SERPL-SCNC: 27 MMOL/L
COLOR: YELLOW
CREAT SERPL-MCNC: 1.63 MG/DL
EGFRCR SERPLBLD CKD-EPI 2021: 31 ML/MIN/1.73M2
GLUCOSE QUALITATIVE U: NEGATIVE MG/DL
GLUCOSE SERPL-MCNC: 92 MG/DL
HCT VFR BLD CALC: 41.1 %
HDLC SERPL-MCNC: 39 MG/DL
HGB BLD-MCNC: 13.1 G/DL
KETONES URINE: NEGATIVE MG/DL
LDLC SERPL-MCNC: 82 MG/DL
LEUKOCYTE ESTERASE URINE: ABNORMAL
MCHC RBC-ENTMCNC: 29.8 PG
MCHC RBC-ENTMCNC: 31.9 G/DL
MCV RBC AUTO: 93.6 FL
NITRITE URINE: NEGATIVE
NONHDLC SERPL-MCNC: 108 MG/DL
PH URINE: 6
PLATELET # BLD AUTO: 121 K/UL
POTASSIUM SERPL-SCNC: 4.2 MMOL/L
PROT SERPL-MCNC: 6.4 G/DL
PROTEIN URINE: 30 MG/DL
RBC # BLD: 4.39 M/UL
RBC # FLD: 12.8 %
SODIUM SERPL-SCNC: 146 MMOL/L
SPECIFIC GRAVITY URINE: 1.02
TRIGL SERPL-MCNC: 149 MG/DL
TSH SERPL-ACNC: 0.73 UIU/ML
UROBILINOGEN URINE: 0.2 MG/DL
VIT B12 SERPL-MCNC: 324 PG/ML
WBC # FLD AUTO: 5.97 K/UL

## 2025-08-21 ENCOUNTER — APPOINTMENT (OUTPATIENT)
Dept: CARDIOLOGY | Facility: CLINIC | Age: 85
End: 2025-08-21

## 2025-08-21 ENCOUNTER — APPOINTMENT (OUTPATIENT)
Dept: INTERNAL MEDICINE | Facility: CLINIC | Age: 85
End: 2025-08-21

## 2025-08-21 DIAGNOSIS — Z00.00 ENCOUNTER FOR GENERAL ADULT MEDICAL EXAMINATION W/OUT ABNORMAL FINDINGS: ICD-10-CM

## 2025-08-21 DIAGNOSIS — I10 ESSENTIAL (PRIMARY) HYPERTENSION: ICD-10-CM

## 2025-08-21 DIAGNOSIS — E78.5 HYPERLIPIDEMIA, UNSPECIFIED: ICD-10-CM
